# Patient Record
Sex: FEMALE | Race: WHITE | NOT HISPANIC OR LATINO | Employment: OTHER | URBAN - METROPOLITAN AREA
[De-identification: names, ages, dates, MRNs, and addresses within clinical notes are randomized per-mention and may not be internally consistent; named-entity substitution may affect disease eponyms.]

---

## 2017-02-15 ENCOUNTER — TELEPHONE (OUTPATIENT)
Dept: MEDICAL GROUP | Age: 37
End: 2017-02-15

## 2017-02-16 NOTE — TELEPHONE ENCOUNTER
1. Caller Name: Sara Taylor                                         Call Back Number: [phone] (home)       Patient approves a detailed voicemail message: N\A    Returned patient voicemail regarding adderall medication, pt was not specific about what she needed. Left VM for her to call us back and will try again at a later time.

## 2017-02-16 NOTE — TELEPHONE ENCOUNTER
Phone Number Called: [phone] (home)     Message: Left message for the patient to call back regarding aderall medication.    Left Message for patient to call back: yes

## 2017-02-17 NOTE — TELEPHONE ENCOUNTER
Phone Number Called: [phone] (home)     Message: called pt left message for pt to call back, [employer] message sent to pt for her to provide more information.     Left Message for patient to call back: yes

## 2017-02-21 DIAGNOSIS — F98.8 ADD (ATTENTION DEFICIT DISORDER): ICD-10-CM

## 2017-02-21 RX ORDER — DEXTROAMPHETAMINE SACCHARATE, AMPHETAMINE ASPARTATE MONOHYDRATE, DEXTROAMPHETAMINE SULFATE AND AMPHETAMINE SULFATE 5; 5; 5; 5 MG/1; MG/1; MG/1; MG/1
20 CAPSULE, EXTENDED RELEASE ORAL EVERY MORNING
Qty: 90 CAP | Refills: 0 | Status: SHIPPED | OUTPATIENT
Start: 2017-02-21 | End: 2017-03-14 | Stop reason: SDUPTHER

## 2017-02-21 RX ORDER — DEXTROAMPHETAMINE SACCHARATE, AMPHETAMINE ASPARTATE, DEXTROAMPHETAMINE SULFATE AND AMPHETAMINE SULFATE 2.5; 2.5; 2.5; 2.5 MG/1; MG/1; MG/1; MG/1
10 TABLET ORAL DAILY
Qty: 90 TAB | Refills: 0 | Status: SHIPPED | OUTPATIENT
Start: 2017-02-21 | End: 2017-03-14 | Stop reason: SDUPTHER

## 2017-02-21 NOTE — TELEPHONE ENCOUNTER
Was the patient seen in the last year in this department? Yes     Does patient have an active prescription for medications requested? No     Received Request Via: Patient      PLEASE CONTACT PATIENT WITH RESPONSE.  DETAILED MESSAGE OKAY.  PATIENT REQUESTING TO DO 6 MO AT A TIME DUE TO HAVING A DIFFERENT INSURANCE AND HAVING TO PAY OUT OF POCKET OF $120 FOR EACH VISIT.  PLEASE ADVISE.

## 2017-02-22 NOTE — TELEPHONE ENCOUNTER
Phone Number Called: [phone] (home)     Message: Left VM for patient to call us back to schedule appt, rxs placed in patient  file and patient notified they are ready for .    Left Message for patient to call back: yes

## 2017-02-27 ENCOUNTER — APPOINTMENT (OUTPATIENT)
Dept: MEDICAL GROUP | Age: 37
End: 2017-02-27
Payer: COMMERCIAL

## 2017-03-09 RX ORDER — DEXTROAMPHETAMINE SACCHARATE, AMPHETAMINE ASPARTATE, DEXTROAMPHETAMINE SULFATE AND AMPHETAMINE SULFATE 2.5; 2.5; 2.5; 2.5 MG/1; MG/1; MG/1; MG/1
10 TABLET ORAL
Qty: 30 TAB | Refills: 0 | Status: SHIPPED | OUTPATIENT
Start: 2017-03-09 | End: 2017-04-08

## 2017-03-09 RX ORDER — DEXTROAMPHETAMINE SACCHARATE, AMPHETAMINE ASPARTATE MONOHYDRATE, DEXTROAMPHETAMINE SULFATE AND AMPHETAMINE SULFATE 5; 5; 5; 5 MG/1; MG/1; MG/1; MG/1
20 CAPSULE, EXTENDED RELEASE ORAL EVERY MORNING
Qty: 30 CAP | Refills: 0 | Status: SHIPPED | OUTPATIENT
Start: 2017-03-09 | End: 2017-04-07

## 2017-03-09 NOTE — TELEPHONE ENCOUNTER
1. Caller Name: Sara Taylor                                           Call Back Number: [phone] (home)         Patient approves a detailed voicemail message: yes    Dr. Esqueda patient called requesting 6 refills of her medication 30 for each RX as a 90 day supply is expensive for her. Please advise

## 2017-03-09 NOTE — TELEPHONE ENCOUNTER
Ok, but Pt needs to be seen very 4 mos to refill her adderall. So make appt now to be seen before anymore refills.  Have her bring the other rxs ffor #90 each in exchange the new rxs for #30

## 2017-03-09 NOTE — TELEPHONE ENCOUNTER
Phone Number Called: [phone] (home)     Message: Left message for pt to call us back regarding note below. New rx's placed in pt  file    Left Message for patient to call back: yes

## 2017-03-13 RX ORDER — BUTALBITAL, ACETAMINOPHEN AND CAFFEINE 50; 325; 40 MG/1; MG/1; MG/1
TABLET ORAL
Qty: 30 TAB | Refills: 0 | Status: SHIPPED | OUTPATIENT
Start: 2017-03-13 | End: 2017-03-14 | Stop reason: SDUPTHER

## 2017-03-14 DIAGNOSIS — F98.8 ADD (ATTENTION DEFICIT DISORDER): ICD-10-CM

## 2017-03-14 RX ORDER — DEXTROAMPHETAMINE SACCHARATE, AMPHETAMINE ASPARTATE MONOHYDRATE, DEXTROAMPHETAMINE SULFATE AND AMPHETAMINE SULFATE 5; 5; 5; 5 MG/1; MG/1; MG/1; MG/1
20 CAPSULE, EXTENDED RELEASE ORAL EVERY MORNING
Qty: 30 CAP | Refills: 0 | Status: SHIPPED | OUTPATIENT
Start: 2017-04-13 | End: 2017-05-13

## 2017-03-14 RX ORDER — DEXTROAMPHETAMINE SACCHARATE, AMPHETAMINE ASPARTATE, DEXTROAMPHETAMINE SULFATE AND AMPHETAMINE SULFATE 2.5; 2.5; 2.5; 2.5 MG/1; MG/1; MG/1; MG/1
10 TABLET ORAL
Qty: 30 TAB | Refills: 0 | Status: SHIPPED | OUTPATIENT
Start: 2017-04-14 | End: 2017-05-14

## 2017-03-14 RX ORDER — DEXTROAMPHETAMINE SACCHARATE, AMPHETAMINE ASPARTATE MONOHYDRATE, DEXTROAMPHETAMINE SULFATE AND AMPHETAMINE SULFATE 5; 5; 5; 5 MG/1; MG/1; MG/1; MG/1
20 CAPSULE, EXTENDED RELEASE ORAL EVERY MORNING
Qty: 30 CAP | Refills: 0 | Status: SHIPPED | OUTPATIENT
Start: 2017-05-14 | End: 2017-06-05 | Stop reason: SDUPTHER

## 2017-03-14 RX ORDER — DEXTROAMPHETAMINE SACCHARATE, AMPHETAMINE ASPARTATE, DEXTROAMPHETAMINE SULFATE AND AMPHETAMINE SULFATE 2.5; 2.5; 2.5; 2.5 MG/1; MG/1; MG/1; MG/1
10 TABLET ORAL DAILY
Qty: 30 TAB | Refills: 0 | Status: SHIPPED | OUTPATIENT
Start: 2017-05-14 | End: 2017-06-05 | Stop reason: SDUPTHER

## 2017-03-14 RX ORDER — BUTALBITAL, ACETAMINOPHEN AND CAFFEINE 50; 325; 40 MG/1; MG/1; MG/1
1 TABLET ORAL EVERY 4 HOURS PRN
Qty: 30 TAB | Refills: 3 | Status: SHIPPED | OUTPATIENT
Start: 2017-03-14 | End: 2017-06-05 | Stop reason: SDUPTHER

## 2017-05-16 ENCOUNTER — TELEPHONE (OUTPATIENT)
Dept: MEDICAL GROUP | Age: 37
End: 2017-05-16

## 2017-05-16 NOTE — TELEPHONE ENCOUNTER
Phone Number Called: [phone] (home)     Message: returning pt vm regarding med refills.  Left message for patient to return call.    Left Message for patient to call back: yes    Future Appointments       Provider Department Center    6/5/2017 1:00 PM Leighton Esqueda M.D. [facility] [address]

## 2017-05-16 NOTE — Clinical Note
May 24, 2017        Sara Taylor  [address] [place] [address]        Dear Sara:    Our office has attempted to contact you but have been unsuccessful.  We are contacting you to follow up on a voice message we have received regarding your medication refills.  Please contact our office if this refill request is still needed.  Thank you.    If you have any questions or concerns, please don't hesitate to call.        Sincerely,        Kimi Knutson  Medical Assistant    Electronically Signed

## 2017-05-24 NOTE — TELEPHONE ENCOUNTER
Phone Number Called: [phone] (home)     Message: left message for patient to return call.  Letter sent.    Left Message for patient to call back: yes

## 2017-06-02 ENCOUNTER — TELEPHONE (OUTPATIENT)
Dept: MEDICAL GROUP | Age: 37
End: 2017-06-02

## 2017-06-02 NOTE — TELEPHONE ENCOUNTER
ESTABLISHED PATIENT PRE-VISIT PLANNING     Note: Patient will not be contacted if there is no indication to call.     1.  Reviewed notes from the last few office visits within the medical group: Yes    2.  If any orders were placed at last visit or intended to be done for this visit (i.e. 6 mos follow-up), do we have Results/Consult Notes?        •  Labs - Labs ordered, completed and results are in chart.       •  Imaging - Imaging was not ordered at last office visit.       •  Referrals - No referrals were ordered at last office visit.    3. Is this appointment scheduled as a Hospital Follow-Up? No    4.  Immunizations were updated in [facility] using WebIZ?: Yes       •  Web Iz Recommendations: HEPATITIS A  MMR  TD VARICELLA (Chicken Pox)     5.  Patient is due for the following Health Maintenance Topics:   Health Maintenance Due   Topic Date Due   • PAP SMEAR  10/14/2016       - Patient has completed FLU and TDAP Immunization(s) per WebIZ. Chart has been updated.    6.  Patient was not informed to arrive 15 min prior to their scheduled appointment and bring in their medication bottles.

## 2017-06-05 ENCOUNTER — OFFICE VISIT (OUTPATIENT)
Dept: MEDICAL GROUP | Age: 37
End: 2017-06-05
Payer: COMMERCIAL

## 2017-06-05 VITALS
BODY MASS INDEX: 26.5 KG/M2 | WEIGHT: 144 LBS | HEIGHT: 62 IN | TEMPERATURE: 98.1 F | SYSTOLIC BLOOD PRESSURE: 124 MMHG | OXYGEN SATURATION: 93 % | HEART RATE: 90 BPM | DIASTOLIC BLOOD PRESSURE: 84 MMHG

## 2017-06-05 DIAGNOSIS — L72.3 SEBACEOUS CYST: ICD-10-CM

## 2017-06-05 DIAGNOSIS — M79.7 SCAPULOHUMERAL FIBROSITIS: ICD-10-CM

## 2017-06-05 DIAGNOSIS — E78.2 MIXED HYPERLIPIDEMIA: ICD-10-CM

## 2017-06-05 DIAGNOSIS — M54.40 CHRONIC MIDLINE LOW BACK PAIN WITH SCIATICA, SCIATICA LATERALITY UNSPECIFIED: ICD-10-CM

## 2017-06-05 DIAGNOSIS — G89.29 CHRONIC MIDLINE LOW BACK PAIN WITH SCIATICA, SCIATICA LATERALITY UNSPECIFIED: ICD-10-CM

## 2017-06-05 DIAGNOSIS — N94.6 MENSTRUAL CRAMPS: ICD-10-CM

## 2017-06-05 DIAGNOSIS — G43.109 MIGRAINE WITH AURA AND WITHOUT STATUS MIGRAINOSUS, NOT INTRACTABLE: ICD-10-CM

## 2017-06-05 DIAGNOSIS — F98.8 ADD (ATTENTION DEFICIT DISORDER): ICD-10-CM

## 2017-06-05 PROCEDURE — 99215 OFFICE O/P EST HI 40 MIN: CPT | Performed by: INTERNAL MEDICINE

## 2017-06-05 RX ORDER — DIAZEPAM 5 MG/1
5 TABLET ORAL NIGHTLY PRN
Qty: 30 TAB | Refills: 3 | Status: SHIPPED | OUTPATIENT
Start: 2017-06-05 | End: 2017-12-12 | Stop reason: SDUPTHER

## 2017-06-05 RX ORDER — DEXTROAMPHETAMINE SACCHARATE, AMPHETAMINE ASPARTATE, DEXTROAMPHETAMINE SULFATE AND AMPHETAMINE SULFATE 2.5; 2.5; 2.5; 2.5 MG/1; MG/1; MG/1; MG/1
10 TABLET ORAL DAILY
Qty: 30 TAB | Refills: 0 | Status: SHIPPED | OUTPATIENT
Start: 2017-06-16 | End: 2018-03-29

## 2017-06-05 RX ORDER — DEXTROAMPHETAMINE SACCHARATE, AMPHETAMINE ASPARTATE MONOHYDRATE, DEXTROAMPHETAMINE SULFATE AND AMPHETAMINE SULFATE 5; 5; 5; 5 MG/1; MG/1; MG/1; MG/1
20 CAPSULE, EXTENDED RELEASE ORAL EVERY MORNING
Qty: 30 CAP | Refills: 0 | Status: SHIPPED | OUTPATIENT
Start: 2017-07-17 | End: 2018-03-29 | Stop reason: SDUPTHER

## 2017-06-05 RX ORDER — DEXTROAMPHETAMINE SACCHARATE, AMPHETAMINE ASPARTATE MONOHYDRATE, DEXTROAMPHETAMINE SULFATE AND AMPHETAMINE SULFATE 5; 5; 5; 5 MG/1; MG/1; MG/1; MG/1
20 CAPSULE, EXTENDED RELEASE ORAL EVERY MORNING
Qty: 30 CAP | Refills: 0 | Status: SHIPPED | OUTPATIENT
Start: 2017-08-16 | End: 2017-09-15

## 2017-06-05 RX ORDER — DEXTROAMPHETAMINE SACCHARATE, AMPHETAMINE ASPARTATE, DEXTROAMPHETAMINE SULFATE AND AMPHETAMINE SULFATE 2.5; 2.5; 2.5; 2.5 MG/1; MG/1; MG/1; MG/1
10 TABLET ORAL DAILY
Qty: 30 TAB | Refills: 0 | Status: SHIPPED | OUTPATIENT
Start: 2017-10-16 | End: 2017-11-15

## 2017-06-05 RX ORDER — DEXTROAMPHETAMINE SACCHARATE, AMPHETAMINE ASPARTATE MONOHYDRATE, DEXTROAMPHETAMINE SULFATE AND AMPHETAMINE SULFATE 5; 5; 5; 5 MG/1; MG/1; MG/1; MG/1
20 CAPSULE, EXTENDED RELEASE ORAL EVERY MORNING
Qty: 30 CAP | Refills: 0 | Status: SHIPPED | OUTPATIENT
Start: 2017-09-15 | End: 2017-10-15

## 2017-06-05 RX ORDER — DEXTROAMPHETAMINE SACCHARATE, AMPHETAMINE ASPARTATE MONOHYDRATE, DEXTROAMPHETAMINE SULFATE AND AMPHETAMINE SULFATE 5; 5; 5; 5 MG/1; MG/1; MG/1; MG/1
20 CAPSULE, EXTENDED RELEASE ORAL EVERY MORNING
Qty: 30 CAP | Refills: 0 | Status: SHIPPED | OUTPATIENT
Start: 2017-10-16 | End: 2017-11-15

## 2017-06-05 RX ORDER — DEXTROAMPHETAMINE SACCHARATE, AMPHETAMINE ASPARTATE, DEXTROAMPHETAMINE SULFATE AND AMPHETAMINE SULFATE 2.5; 2.5; 2.5; 2.5 MG/1; MG/1; MG/1; MG/1
10 TABLET ORAL DAILY
Qty: 30 TAB | Refills: 0 | Status: SHIPPED | OUTPATIENT
Start: 2017-08-16 | End: 2017-09-15

## 2017-06-05 RX ORDER — DEXTROAMPHETAMINE SACCHARATE, AMPHETAMINE ASPARTATE, DEXTROAMPHETAMINE SULFATE AND AMPHETAMINE SULFATE 2.5; 2.5; 2.5; 2.5 MG/1; MG/1; MG/1; MG/1
10 TABLET ORAL DAILY
Qty: 30 TAB | Refills: 0 | Status: SHIPPED | OUTPATIENT
Start: 2017-07-16 | End: 2018-03-29 | Stop reason: SDUPTHER

## 2017-06-05 RX ORDER — BUTALBITAL, ACETAMINOPHEN AND CAFFEINE 50; 325; 40 MG/1; MG/1; MG/1
1 TABLET ORAL EVERY 4 HOURS PRN
Qty: 30 TAB | Refills: 3 | Status: SHIPPED | OUTPATIENT
Start: 2017-06-05 | End: 2018-03-29

## 2017-06-05 RX ORDER — DEXTROAMPHETAMINE SACCHARATE, AMPHETAMINE ASPARTATE MONOHYDRATE, DEXTROAMPHETAMINE SULFATE AND AMPHETAMINE SULFATE 5; 5; 5; 5 MG/1; MG/1; MG/1; MG/1
20 CAPSULE, EXTENDED RELEASE ORAL EVERY MORNING
Qty: 30 CAP | Refills: 0 | Status: SHIPPED | OUTPATIENT
Start: 2017-06-16 | End: 2018-03-29

## 2017-06-05 RX ORDER — DEXTROAMPHETAMINE SACCHARATE, AMPHETAMINE ASPARTATE MONOHYDRATE, DEXTROAMPHETAMINE SULFATE AND AMPHETAMINE SULFATE 5; 5; 5; 5 MG/1; MG/1; MG/1; MG/1
20 CAPSULE, EXTENDED RELEASE ORAL EVERY MORNING
Qty: 30 CAP | Refills: 0 | Status: SHIPPED | OUTPATIENT
Start: 2017-11-15 | End: 2017-12-15

## 2017-06-05 RX ORDER — CYCLOBENZAPRINE HCL 10 MG
10 TABLET ORAL 3 TIMES DAILY PRN
Qty: 30 TAB | Refills: 3 | Status: SHIPPED | OUTPATIENT
Start: 2017-06-05 | End: 2017-06-05

## 2017-06-05 RX ORDER — DEXTROAMPHETAMINE SACCHARATE, AMPHETAMINE ASPARTATE, DEXTROAMPHETAMINE SULFATE AND AMPHETAMINE SULFATE 2.5; 2.5; 2.5; 2.5 MG/1; MG/1; MG/1; MG/1
10 TABLET ORAL DAILY
Qty: 30 TAB | Refills: 0 | Status: SHIPPED | OUTPATIENT
Start: 2017-09-15 | End: 2017-10-15

## 2017-06-05 RX ORDER — DIAZEPAM 5 MG/1
5 TABLET ORAL NIGHTLY PRN
Qty: 5 TAB | Refills: 3 | Status: SHIPPED | OUTPATIENT
Start: 2017-06-05 | End: 2017-06-05 | Stop reason: SDUPTHER

## 2017-06-05 RX ORDER — DEXTROAMPHETAMINE SACCHARATE, AMPHETAMINE ASPARTATE, DEXTROAMPHETAMINE SULFATE AND AMPHETAMINE SULFATE 2.5; 2.5; 2.5; 2.5 MG/1; MG/1; MG/1; MG/1
10 TABLET ORAL DAILY
Qty: 30 TAB | Refills: 0 | Status: SHIPPED | OUTPATIENT
Start: 2017-11-15 | End: 2017-12-15

## 2017-06-05 ASSESSMENT — ENCOUNTER SYMPTOMS
MUSCULOSKELETAL NEGATIVE: 1
CONSTITUTIONAL NEGATIVE: 1
EYES NEGATIVE: 1
RESPIRATORY NEGATIVE: 1
PSYCHIATRIC NEGATIVE: 1
GASTROINTESTINAL NEGATIVE: 1
NEUROLOGICAL NEGATIVE: 1
CARDIOVASCULAR NEGATIVE: 1

## 2017-06-05 ASSESSMENT — PATIENT HEALTH QUESTIONNAIRE - PHQ9: CLINICAL INTERPRETATION OF PHQ2 SCORE: 0

## 2017-06-05 NOTE — MR AVS SNAPSHOT
"        Sara Brandon   2017 1:00 PM   Office Visit   MRN: [id number]    Department:  [address] [place] [address]   Dept Phone:  [phone]    Description:  Female : 1980   Provider:  Leighton Esqueda M.D.           Reason for Visit     Medication Refill follow up      Allergies as of 2017     No Known Allergies      You were diagnosed with     Sebaceous cyst   [706.2.ICD-9-CM]       ADD (attention deficit disorder)   [559376]       Scapulohumeral fibrositis   [734714]       Menstrual cramps   []       Mixed hyperlipidemia   [272.2.ICD-9-CM]       Chronic midline low back pain with sciatica, sciatica laterality unspecified   [0077725]       Migraine with aura and without status migrainosus, not intractable   [497286]         Vital Signs     Blood Pressure Pulse Temperature Height Weight Body Mass Index    124/84 mmHg 90 36.7 °C (98.1 °F) 1.575 m (5' 2.01\") 65.318 kg (144 lb) 26.33 kg/m2    Oxygen Saturation Last Menstrual Period Smoking Status             93% 2015 Never Smoker          Basic Information     Date Of Birth Sex Race Ethnicity Preferred Language    1980 Female White Non- English      Your appointments     Dec 05, 2017  1:00 PM   Established Patient with Leighton Esqueda M.D.   [address] [id number] [phone]           You will be receiving a confirmation call a few days before your appointment from our automated call confirmation system.              Problem List              ICD-10-CM Priority Class Noted - Resolved    ADD (attention deficit disorder) F98.8   10/7/2013 - Present    Mixed hyperlipidemia E78.2   10/7/2013 - Present    Migraine with aura and without status migrainosus, not intractable G43.109   2015 - Present    Sebaceous cyst L72.3   2016 - Present    Vitamin D deficiency E55.9   8/15/2016 - Present    Midline low back pain with sciatica M54.40   8/15/2016 - Present      Health " Maintenance        Date Due Completion Dates    PAP SMEAR 10/14/2016 10/14/2013    IMM DTaP/Tdap/Td Vaccine (2 - Td) 10/5/2025 10/5/2015            Current Immunizations     Influenza Vaccine Quad Inj (Pf) 11/28/2016    Influenza Vaccine Quad Inj (Preserved) 11/1/2015  8:52 AM    Tdap Vaccine 10/5/2015      Below and/or attached are the medications your provider expects you to take. Review all of your home medications and newly ordered medications with your provider and/or pharmacist. Follow medication instructions as directed by your provider and/or pharmacist. Please keep your medication list with you and share with your provider. Update the information when medications are discontinued, doses are changed, or new medications (including over-the-counter products) are added; and carry medication information at all times in the event of emergency situations     Allergies:  No Known Allergies          Medications  Valid as of: June 05, 2017 -  1:47 PM    Generic Name Brand Name Tablet Size Instructions for use    Amphetamine-Dextroamphetamine (Tab) ADDERALL 10 MG Take 1 Tab by mouth every day for 30 days.        Amphetamine-Dextroamphetamine (CAPSULE SR 24 HR) ADDERALL XR 20 MG Take 1 Cap by mouth every morning for 30 days.        Amphetamine-Dextroamphetamine (Tab) ADDERALL 10 MG Take 1 Tab by mouth every day for 30 days.        Amphetamine-Dextroamphetamine (CAPSULE SR 24 HR) ADDERALL XR 20 MG Take 1 Cap by mouth every morning for 30 days.        Amphetamine-Dextroamphetamine (Tab) ADDERALL 10 MG Take 1 Tab by mouth every day for 30 days.        Amphetamine-Dextroamphetamine (Tab) ADDERALL 10 MG Take 1 Tab by mouth every day for 30 days.        Amphetamine-Dextroamphetamine (CAPSULE SR 24 HR) ADDERALL XR 20 MG Take 1 Cap by mouth every morning for 30 days.        Amphetamine-Dextroamphetamine (CAPSULE SR 24 HR) ADDERALL XR 20 MG Take 1 Cap by mouth every morning for 30 days.        Amphetamine-Dextroamphetamine  (Tab) ADDERALL 10 MG Take 1 Tab by mouth every day for 30 days.        Amphetamine-Dextroamphetamine (CAPSULE SR 24 HR) ADDERALL XR 20 MG Take 1 Cap by mouth every morning for 30 days.        Amphetamine-Dextroamphetamine (Tab) ADDERALL 10 MG Take 1 Tab by mouth every day for 30 days.        Amphetamine-Dextroamphetamine (CAPSULE SR 24 HR) ADDERALL XR 20 MG Take 1 Cap by mouth every morning for 30 days.        Butalbital-APAP-Caffeine (Tab) FIORICET -40 MG Take 1 Tab by mouth every four hours as needed. FOR HEADACHE        Cholecalciferol (Tab) Vitamin D3 5000 UNITS Take  by mouth.        Cyanocobalamin (Liquid) Vitamin B-12 1000 MCG/15ML Take  by mouth.        DiazePAM (Tab) VALIUM 5 MG Take 1 Tab by mouth at bedtime as needed (pelvic cramps).        Ferrous Sulfate (Tab) ferrous sulfate 325 (65 FE) MG Take 1 Tab by mouth every morning with breakfast.        Misc. Devices (Misc) Breast Pump  1 Device by Apply externally route every day.        Prenatal Multivit-Min-Fe-FA   Take  by mouth.        .                 Medicines prescribed today were sent to:     [id number] - [place], [place] - [address]    [address] [address]    Phone: [phone] Fax: [phone]    Open 24 Hours?: No    [facility]Y #[id number] - [place], [place] - [address]    [address] [facility] [address]    Phone: [phone] Fax: [phone]    Open 24 Hours?: No      Medication refill instructions:       If your prescription bottle indicates you have medication refills left, it is not necessary to call your provider’s office. Please contact your pharmacy and they will refill your medication.    If your prescription bottle indicates you do not have any refills left, you may request refills at any time through one of the following ways: The online [employer] system (except Urgent Care), by calling your provider’s office, or by asking your pharmacy to contact your provider’s office with a refill request. Medication  refills are processed only during regular business hours and may not be available until the next business day. Your provider may request additional information or to have a follow-up visit with you prior to refilling your medication.   *Please Note: Medication refills are assigned a new Rx number when refilled electronically. Your pharmacy may indicate that no refills were authorized even though a new prescription for the same medication is available at the pharmacy. Please request the medicine by name with the pharmacy before contacting your provider for a refill.        Referral     A referral request has been sent to our patient care coordination department. Please allow 3-5 business days for us to process this request and contact you either by phone or mail. If you do not hear from us by the 5th business day, please call us at [phone].           KXEN Access Code: Activation code not generated  Current KXEN Status: Active

## 2017-06-06 NOTE — PROGRESS NOTES
Subjective:      Sara Taylor is a 36 y.o. female who presents with Medication Refill  and   The patient is here for followup of chronic medical problems listed below. The patient is compliant with medications and having no side effects from them. Denies chest pain, abdominal pain, dyspnea, myalgias, or cough.   Patient Active Problem List    Diagnosis Date Noted   • Vitamin D deficiency 08/15/2016   • Midline low back pain with sciatica 08/15/2016   • Sebaceous cyst 05/17/2016   • Migraine with aura and without status migrainosus, not intractable 06/09/2015   • ADD (attention deficit disorder) 10/07/2013   • Mixed hyperlipidemia 10/07/2013     Review of patient's allergies indicates no known allergies.  Outpatient Prescriptions Prior to Visit   Medication Sig Dispense Refill   • Cholecalciferol (VITAMIN D3) 5000 UNITS Tab Take  by mouth.     • Cyanocobalamin (VITAMIN B-12) 1000 MCG/15ML Liquid Take  by mouth.     • ferrous sulfate 325 (65 FE) MG tablet Take 1 Tab by mouth every morning with breakfast. 30 Tab 3   • Prenatal Multivit-Min-Fe-FA (PRENATAL VITAMINS PO) Take  by mouth.     • amphetamine-dextroamphetamine (ADDERALL) 10 MG Tab Take 1 Tab by mouth every day for 30 days. 30 Tab 0   • acetaminophen/caffeine/butalbital 325-40-50 mg (FIORICET) -40 MG Tab Take 1 Tab by mouth every four hours as needed. FOR HEADACHE 30 Tab 3   • amphetamine-dextroamphetamine (ADDERALL XR, 20MG,) 20 MG per XR capsule Take 1 Cap by mouth every morning for 30 days. 30 Cap 0   • diazepam (VALIUM) 5 MG Tab Take 1 Tab by mouth at bedtime as needed (pelvic cramps). 5 Tab 3   • Misc. Devices (BREAST PUMP) Misc 1 Device by Apply externally route every day. (Patient not taking: Reported on 8/15/2016) 1 Each 0     No facility-administered medications prior to visit.               HPI    Review of Systems   Constitutional: Negative.    HENT: Negative.    Eyes: Negative.    Respiratory: Negative.    Cardiovascular: Negative.   "  Gastrointestinal: Negative.    Genitourinary: Negative.    Musculoskeletal: Negative.    Skin: Negative.    Neurological: Negative.    Endo/Heme/Allergies: Negative.    Psychiatric/Behavioral: Negative.           Objective:     /84 mmHg  Pulse 90  Temp(Src) 36.7 °C (98.1 °F)  Ht 1.575 m (5' 2.01\")  Wt 65.318 kg (144 lb)  BMI 26.33 kg/m2  SpO2 93%  LMP 01/22/2015     Physical Exam   Constitutional: She is oriented to person, place, and time. She appears well-developed and well-nourished.   HENT:   Head: Normocephalic and atraumatic.   Right Ear: External ear normal.   Left Ear: External ear normal.   Nose: Nose normal.   Mouth/Throat: Oropharynx is clear and moist.   Eyes: Conjunctivae and EOM are normal. Pupils are equal, round, and reactive to light. Right eye exhibits no discharge. Left eye exhibits no discharge. No scleral icterus.   Neck: Normal range of motion. Neck supple. No JVD present. No tracheal deviation present. No thyromegaly present.   Cardiovascular: Normal rate, regular rhythm, normal heart sounds and intact distal pulses.  Exam reveals no gallop and no friction rub.    Pulmonary/Chest: Effort normal and breath sounds normal. No stridor. No respiratory distress. She has no wheezes. She has no rales. She exhibits no tenderness.   Abdominal: Soft. Bowel sounds are normal. She exhibits no distension and no mass. There is no tenderness. There is no rebound and no guarding. No hernia.   Musculoskeletal: Normal range of motion. She exhibits no edema or tenderness.   Lymphadenopathy:     She has no cervical adenopathy.   Neurological: She is alert and oriented to person, place, and time. She has normal reflexes. She displays normal reflexes. No cranial nerve deficit. Coordination normal.   Skin: Skin is warm and dry. No rash noted. No erythema. No pallor.   Psychiatric: She has a normal mood and affect. Her behavior is normal. Judgment and thought content normal.   Nursing note and vitals " reviewed.  No visits with results within 1 Month(s) from this visit.  Latest known visit with results is:    Hospital Outpatient Visit on 11/29/2016   Component Date Value   • WBC 11/29/2016 6.3    • RBC 11/29/2016 5.16    • Hemoglobin 11/29/2016 15.8    • Hematocrit 11/29/2016 47.4*   • MCV 11/29/2016 91.9    • MCH 11/29/2016 30.6    • MCHC 11/29/2016 33.3*   • RDW 11/29/2016 41.2    • Platelet Count 11/29/2016 201    • MPV 11/29/2016 10.5    • Neutrophils-Polys 11/29/2016 57.10    • Lymphocytes 11/29/2016 31.60    • Monocytes 11/29/2016 8.40    • Eosinophils 11/29/2016 2.20    • Basophils 11/29/2016 0.50    • Immature Granulocytes 11/29/2016 0.20    • Nucleated RBC 11/29/2016 0.00    • Neutrophils (Absolute) 11/29/2016 3.60    • Lymphs (Absolute) 11/29/2016 1.99    • Monos (Absolute) 11/29/2016 0.53    • Eos (Absolute) 11/29/2016 0.14    • Baso (Absolute) 11/29/2016 0.03    • Immature Granulocytes (a* 11/29/2016 0.01    • NRBC (Absolute) 11/29/2016 0.00    • Cholesterol,Tot 11/29/2016 225*   • Triglycerides 11/29/2016 87    • HDL 11/29/2016 74    • LDL 11/29/2016 134*   • Sodium 11/29/2016 138    • Potassium 11/29/2016 4.2    • Chloride 11/29/2016 103    • Co2 11/29/2016 29    • Anion Gap 11/29/2016 6.0    • Glucose 11/29/2016 84    • Bun 11/29/2016 10    • Creatinine 11/29/2016 0.82    • Calcium 11/29/2016 9.5    • AST(SGOT) 11/29/2016 16    • ALT(SGPT) 11/29/2016 18    • Alkaline Phosphatase 11/29/2016 81    • Total Bilirubin 11/29/2016 0.9    • Albumin 11/29/2016 4.4    • Total Protein 11/29/2016 7.5    • Globulin 11/29/2016 3.1    • A-G Ratio 11/29/2016 1.4    • 25-Hydroxy   Vitamin D 25 11/29/2016 22*   • GFR If  11/29/2016 >60    • GFR If Non  Ameri* 11/29/2016 >60       No results found for: HBA1C  Lab Results   Component Value Date/Time    SODIUM 138 11/29/2016 12:13 PM    POTASSIUM 4.2 11/29/2016 12:13 PM    CHLORIDE 103 11/29/2016 12:13 PM    CO2 29 11/29/2016 12:13 PM    GLUCOSE  84 11/29/2016 12:13 PM    BUN 10 11/29/2016 12:13 PM    CREATININE 0.82 11/29/2016 12:13 PM    ALKALINE PHOSPHATASE 81 11/29/2016 12:13 PM    AST(SGOT) 16 11/29/2016 12:13 PM    ALT(SGPT) 18 11/29/2016 12:13 PM    TOTAL BILIRUBIN 0.9 11/29/2016 12:13 PM     No results found for: INR  Lab Results   Component Value Date/Time    CHOLESTEROL,* 11/29/2016 12:13 PM    * 11/29/2016 12:13 PM    HDL 74 11/29/2016 12:13 PM    TRIGLYCERIDES 87 11/29/2016 12:13 PM       No results found for: TESTOSTERONE  No results found for: TSH  Lab Results   Component Value Date/Time    FREE T-4 0.98 09/09/2013 11:26 AM     No results found for: URICACID  No components found for: VITB12  Lab Results   Component Value Date/Time    25-HYDROXY   VITAMIN D 25 22* 11/29/2016 12:13 PM                  Assessment/Plan:     1. Sebaceous cyst-this problem on her right dorsal forearm has recurred even those excised last year by her dermatologist. She'll be referred back to the dermatologist for reexcision. This may represent a granuloma that may again recur but we will defer to the dermatologist on this.         - REFERRAL TO DERMATOLOGY    2. ADD (attention deficit disorder)-this is under good control with Adderall 30 mg a day total in divided dosages of 20 and 10. Patient provided with a 6 month supply of refills and will recheck again at that time. Since she seems to be doing well and stable on this regimen without any increase tolerance or abuse, will not need to see her back anymore frequently than this.     - amphetamine-dextroamphetamine (ADDERALL) 10 MG Tab; Take 1 Tab by mouth every day for 30 days.  Dispense: 30 Tab; Refill: 0  - amphetamine-dextroamphetamine (ADDERALL XR, 20MG,) 20 MG per XR capsule; Take 1 Cap by mouth every morning for 30 days.  Dispense: 30 Cap; Refill: 0  - amphetamine-dextroamphetamine (ADDERALL) 10 MG Tab; Take 1 Tab by mouth every day for 30 days.  Dispense: 30 Tab; Refill: 0  -  amphetamine-dextroamphetamine (ADDERALL XR, 20MG,) 20 MG per XR capsule; Take 1 Cap by mouth every morning for 30 days.  Dispense: 30 Cap; Refill: 0  - amphetamine-dextroamphetamine (ADDERALL) 10 MG Tab; Take 1 Tab by mouth every day for 30 days.  Dispense: 30 Tab; Refill: 0  - amphetamine-dextroamphetamine (ADDERALL) 10 MG Tab; Take 1 Tab by mouth every day for 30 days.  Dispense: 30 Tab; Refill: 0  - amphetamine-dextroamphetamine (ADDERALL XR, 20MG,) 20 MG per XR capsule; Take 1 Cap by mouth every morning for 30 days.  Dispense: 30 Cap; Refill: 0  - amphetamine-dextroamphetamine (ADDERALL XR, 20MG,) 20 MG per XR capsule; Take 1 Cap by mouth every morning for 30 days.  Dispense: 30 Cap; Refill: 0  - amphetamine-dextroamphetamine (ADDERALL) 10 MG Tab; Take 1 Tab by mouth every day for 30 days.  Dispense: 30 Tab; Refill: 0  - amphetamine-dextroamphetamine (ADDERALL XR, 20MG,) 20 MG per XR capsule; Take 1 Cap by mouth every morning for 30 days.  Dispense: 30 Cap; Refill: 0  - amphetamine-dextroamphetamine (ADDERALL) 10 MG Tab; Take 1 Tab by mouth every day for 30 days.  Dispense: 30 Tab; Refill: 0  - amphetamine-dextroamphetamine (ADDERALL XR, 20MG,) 20 MG per XR capsule; Take 1 Cap by mouth every morning for 30 days.  Dispense: 30 Cap; Refill: 0    3. Scapulohumeral fibrositis-is a new problem. Continue to have intermittent muscle spasms of the left infrascapular area for which value may be useful as well as physical therapy sheet stretching and physical activity. Patient educated. Trial of Valium.     - REFERRAL TO PHYSICAL THERAPY Reason for Therapy: Eval/Treat/Report  - diazepam (VALIUM) 5 MG Tab; Take 1 Tab by mouth at bedtime as needed (pelvic cramps).  Dispense: 30 Tab; Refill: 3    4. Menstrual cramps Under good control. Continue same regimen. This is work well for her periodically for menstrual cramps and she'll continue using on an as-needed basis.     - diazepam (VALIUM) 5 MG Tab; Take 1 Tab by mouth at  bedtime as needed (pelvic cramps).  Dispense: 30 Tab; Refill: 3    5. Mixed hyperlipidemia-mild without need for medications. Diet and exercise. Good cholesterol HDL ratio includes a need for medication.- Under good control. Continue same regimen.     6. Chronic midline low back pain with sciatica, sciatica laterality unspecified Under good control. Continue same regimen. Continue with rest stretching strengthening anti-inflammatories and heat       7. Migraine with aura and without status migrainosus, not intractable Under good control. Continue same regimen.      - acetaminophen/caffeine/butalbital 325-40-50 mg (FIORICET) -40 MG Tab; Take 1 Tab by mouth every four hours as needed. FOR HEADACHE  Dispense: 30 Tab; Refill: 3        40 minute face-to-face encounter took place today.  More than half of this time was spent in the coordination of care of the above problems, as well as counseling.

## 2017-12-05 ENCOUNTER — APPOINTMENT (OUTPATIENT)
Dept: MEDICAL GROUP | Age: 37
End: 2017-12-05
Payer: COMMERCIAL

## 2017-12-12 DIAGNOSIS — N94.6 MENSTRUAL CRAMPS: ICD-10-CM

## 2017-12-12 DIAGNOSIS — M79.7 SCAPULOHUMERAL FIBROSITIS: ICD-10-CM

## 2017-12-12 RX ORDER — DIAZEPAM 5 MG/1
TABLET ORAL
Qty: 30 TAB | Refills: 0 | Status: SHIPPED | OUTPATIENT
Start: 2017-12-12 | End: 2018-03-29

## 2018-01-25 ENCOUNTER — HOSPITAL ENCOUNTER (OUTPATIENT)
Facility: MEDICAL CENTER | Age: 38
End: 2018-01-25
Attending: OBSTETRICS & GYNECOLOGY
Payer: COMMERCIAL

## 2018-01-25 ENCOUNTER — GYNECOLOGY VISIT (OUTPATIENT)
Dept: OBGYN | Facility: MEDICAL CENTER | Age: 38
End: 2018-01-25
Payer: COMMERCIAL

## 2018-01-25 VITALS
SYSTOLIC BLOOD PRESSURE: 120 MMHG | HEIGHT: 62 IN | DIASTOLIC BLOOD PRESSURE: 70 MMHG | BODY MASS INDEX: 30.73 KG/M2 | WEIGHT: 167 LBS

## 2018-01-25 DIAGNOSIS — Z11.51 ENCOUNTER FOR SCREENING FOR HUMAN PAPILLOMAVIRUS (HPV): ICD-10-CM

## 2018-01-25 DIAGNOSIS — Z01.419 WELL WOMAN EXAM WITH ROUTINE GYNECOLOGICAL EXAM: ICD-10-CM

## 2018-01-25 DIAGNOSIS — N83.9 PROBLEMS WITH OVULATION: ICD-10-CM

## 2018-01-25 DIAGNOSIS — Z12.4 SCREENING FOR CERVICAL CANCER: ICD-10-CM

## 2018-01-25 PROCEDURE — 99395 PREV VISIT EST AGE 18-39: CPT | Performed by: OBSTETRICS & GYNECOLOGY

## 2018-01-25 PROCEDURE — 87624 HPV HI-RISK TYP POOLED RSLT: CPT

## 2018-01-25 PROCEDURE — 88175 CYTOPATH C/V AUTO FLUID REDO: CPT

## 2018-01-25 NOTE — PROGRESS NOTES
ANNUAL Gynecologic Exam     HPI Comments:   37 YEAR OLD  presents for well woman exam. Patient's last menstrual period was 2017.  Pt reports that she had a miscarriage in 2017.   She and  are planning on another pregnancy.   Denies pelvic pains. Regular periods, only in December - last month, that she had 2 periods. Not heavy  This cycle is Day 4. She would like to take clomid  Healthy lifestyle  Never smoker  No family history of breast/ovarian cancer    Review of Systems   Pertinent positives documented in HPI and all other systems reviewed & are negative    All PMH, PSH, allergies, social history and FH reviewed and updated today:  Past Medical History:   Diagnosis Date   • ADD (attention deficit disorder with hyperactivity)      Past Surgical History:   Procedure Laterality Date   • MAMMOPLASTY AUGMENTATION  2010    Performed by GREGORIO NAVA at SURGERY [facility]   • LIPOSUCTION  2010    Performed by GREGORIO NAVA at [facility]     Patient has no known allergies.  Social History     Social History   • Marital status:      Spouse name: N/A   • Number of children: N/A   • Years of education: N/A     Social History Main Topics   • Smoking status: Never Smoker   • Smokeless tobacco: Never Used   • Alcohol use No   • Drug use: No   • Sexual activity: Yes     Partners: Male     Other Topics Concern   • Not on file     Social History Narrative   • No narrative on file     Family History   Problem Relation Age of Onset   • Thyroid Mother    • Heart Disease Father    • Cancer Father 65     colon     Medications:   Current Outpatient Prescriptions Ordered in [facility]   Medication Sig Dispense Refill   • clomiPHENE (CLOMID) 50 MG tablet Take 1 Tab by mouth every day. 5 Tab 0   • diazepam (VALIUM) 5 MG Tab TAKE 1 TABLET BY MOUTH AT BEDTIME AS NEEDED (PELVIC CRAMPS) (Patient not taking: Reported on 2018) 30 Tab 0   • cyclobenzaprine (FLEXERIL) 10 MG  "Tab Take 1 Tab by mouth 3 times a day as needed. (Patient not taking: Reported on 1/25/2018) 30 Tab 0   • acetaminophen/caffeine/butalbital 325-40-50 mg (FIORICET) -40 MG Tab Take 1 Tab by mouth every four hours as needed. FOR HEADACHE (Patient not taking: Reported on 1/25/2018) 30 Tab 3   • Cholecalciferol (VITAMIN D3) 5000 UNITS Tab Take  by mouth.     • Cyanocobalamin (VITAMIN B-12) 1000 MCG/15ML Liquid Take  by mouth.     • ferrous sulfate 325 (65 FE) MG tablet Take 1 Tab by mouth every morning with breakfast. 30 Tab 3   • Misc. Devices (BREAST PUMP) Misc 1 Device by Apply externally route every day. (Patient not taking: Reported on 8/15/2016) 1 Each 0   • Prenatal Multivit-Min-Fe-FA (PRENATAL VITAMINS PO) Take  by mouth.       No current Epic-ordered facility-administered medications on file.           Objective:   Vital measurements:  Blood pressure 120/70, height 1.575 m (5' 2.01\"), weight 75.8 kg (167 lb), last menstrual period 01/22/2017, not currently breastfeeding.  Body mass index is 30.53 kg/m². (Goal BM I>18 <25)    Physical Exam   Nursing note and vitals reviewed.  Constitutional: She is oriented to person, place, and time. She appears well-developed and well-nourished. No distress.     HEENT:   Head: Normocephalic and atraumatic.   Right Ear: External ear normal.   Left Ear: External ear normal.   Nose: Nose normal.   Eyes: Conjunctivae and EOM are normal. Pupils are equal, round, and reactive to light. No scleral icterus.     Neck: Normal range of motion. Neck supple. No tracheal deviation present. No thyromegaly present.     Pulmonary/Chest: Effort normal and breath sounds normal. No respiratory distress. She has no wheezes. She has no rales. She exhibits no tenderness.     Cardiovascular: Regular, rate and rhythm. No JVD.    Abdominal: Soft. Bowel sounds are normal. She exhibits no distension and no mass. No tenderness. She has no rebound and no guarding.     Breast:  Symmetrical, normal " consistency without masses.  C/w mammoplasty    Genitourinary:  Pelvic exam was performed with patient supine.  External genitalia with no abnormal pigmentation, labial fusion,rash, tenderness, lesion or injury to the labia bilaterally.  Vagina is moist with no lesions, foul discharge, erythema, tenderness or bleeding. No foreign body around the vagina or signs of injury.   Cervix exhibits no motion tenderness, no discharge and no friability.   Uterus is not deviated, not enlarged, not fixed and not tender.  Right adnexum displays no mass, no tenderness and no fullness. Left adnexum displays no mass, no tenderness and no fullness.     Musculoskeletal: Normal range of motion. She exhibits no edema and no tenderness.     Lymphadenopathy: She has no cervical adenopathy.     Neurological: She is alert and oriented to person, place, and time. She exhibits normal muscle tone.     Skin: Skin is warm and dry. No rash noted. She is not diaphoretic. No erythema. No pallor.     Psychiatric: She has a normal mood and affect. Her behavior is normal. Judgment and thought content normal.   Assessment:     1. Well woman exam with routine gynecological exam  ThinPrep Pap, w/HPV rflx to genotype   2. Screening for cervical cancer  ThinPrep Pap, w/HPV rflx to genotype   3. Encounter for screening for human papillomavirus (HPV)  ThinPrep Pap, w/HPV rflx to genotype   4. Problems with ovulation  clomiPHENE (CLOMID) 50 MG tablet     Plan:   Pap and physical exam performed. Pap q 3 years  Monthly SBE.  Counseling: breast self exam, STD prevention and HIV risk factors and prevention  Preconception counseling. Continue PNV with FA. R/I/B of clomid discussed not limited to ovarian hyperstimulation syndrome and associated complications. Pt given brochure and stated that she did her own research about it. Instructions on clomid intake and timed-intercourse given  Encourage exercise and proper diet.  Mammograms starting @ age 40 annually.  See  medications and orders placed in encounter report.

## 2018-01-26 ENCOUNTER — HOSPITAL ENCOUNTER (OUTPATIENT)
Facility: MEDICAL CENTER | Age: 38
End: 2018-01-26
Attending: OBSTETRICS & GYNECOLOGY
Payer: COMMERCIAL

## 2018-01-27 LAB
CYTOLOGY REG CYTOL: NORMAL
HPV HR 12 DNA CVX QL NAA+PROBE: NEGATIVE
HPV16 DNA SPEC QL NAA+PROBE: NEGATIVE
HPV18 DNA SPEC QL NAA+PROBE: NEGATIVE
SPECIMEN SOURCE: NORMAL

## 2018-02-08 ENCOUNTER — TELEPHONE (OUTPATIENT)
Dept: OBGYN | Facility: CLINIC | Age: 38
End: 2018-02-08

## 2018-02-08 NOTE — TELEPHONE ENCOUNTER
Called pt and spoke with her and told her she can come in and leave a sample or go to the lab. Pt also can go to urgent care to be treated but pt states she feels like she is fine and will wait a few more days to see how it plays out. I explained that if it is not better in a few days to call me and we can go with one of the other options at that time. Pt verbalizes understanding and has no further questions.

## 2018-02-08 NOTE — TELEPHONE ENCOUNTER
"----- Message from Brittney Ryan sent at 2/8/2018  9:57 AM PST -----  Regarding: Possible UTI after meds  Contact: [phone]  Pt states she might have UIT after taking meds - was told to call if anything \"funny\" happens  "

## 2018-03-16 ENCOUNTER — TELEPHONE (OUTPATIENT)
Dept: MEDICAL GROUP | Age: 38
End: 2018-03-16

## 2018-03-16 NOTE — TELEPHONE ENCOUNTER
Was the patient seen in the last year in this department? Yes     Does patient have an active prescription for medications requested? No     Received Request Via: Patient       Pt called requesting a refill for Adderall 10mg and 20mg XR she states the pharmacy only accepts 90day refill

## 2018-03-29 ENCOUNTER — OFFICE VISIT (OUTPATIENT)
Dept: MEDICAL GROUP | Age: 38
End: 2018-03-29
Payer: COMMERCIAL

## 2018-03-29 VITALS
DIASTOLIC BLOOD PRESSURE: 76 MMHG | HEIGHT: 62 IN | BODY MASS INDEX: 30.73 KG/M2 | SYSTOLIC BLOOD PRESSURE: 124 MMHG | WEIGHT: 167 LBS | TEMPERATURE: 98.8 F | HEART RATE: 64 BPM | OXYGEN SATURATION: 96 %

## 2018-03-29 DIAGNOSIS — F90.0 ATTENTION DEFICIT HYPERACTIVITY DISORDER (ADHD), PREDOMINANTLY INATTENTIVE TYPE: ICD-10-CM

## 2018-03-29 DIAGNOSIS — E66.9 OBESITY (BMI 30-39.9): ICD-10-CM

## 2018-03-29 DIAGNOSIS — E55.9 VITAMIN D DEFICIENCY DISEASE: ICD-10-CM

## 2018-03-29 DIAGNOSIS — F41.9 ANXIETY: ICD-10-CM

## 2018-03-29 DIAGNOSIS — E78.2 MIXED HYPERLIPIDEMIA: ICD-10-CM

## 2018-03-29 DIAGNOSIS — G44.209 TENSION HEADACHE: ICD-10-CM

## 2018-03-29 DIAGNOSIS — M62.838 MUSCLE SPASM: ICD-10-CM

## 2018-03-29 DIAGNOSIS — Z88.9 MULTIPLE ALLERGIES: ICD-10-CM

## 2018-03-29 DIAGNOSIS — G43.109 MIGRAINE WITH AURA AND WITHOUT STATUS MIGRAINOSUS, NOT INTRACTABLE: ICD-10-CM

## 2018-03-29 DIAGNOSIS — R11.0 NAUSEA: ICD-10-CM

## 2018-03-29 PROCEDURE — 99215 OFFICE O/P EST HI 40 MIN: CPT | Performed by: INTERNAL MEDICINE

## 2018-03-29 RX ORDER — DEXTROAMPHETAMINE SACCHARATE, AMPHETAMINE ASPARTATE MONOHYDRATE, DEXTROAMPHETAMINE SULFATE AND AMPHETAMINE SULFATE 5; 5; 5; 5 MG/1; MG/1; MG/1; MG/1
20 CAPSULE, EXTENDED RELEASE ORAL EVERY MORNING
Qty: 90 CAP | Refills: 0 | Status: SHIPPED | OUTPATIENT
Start: 2018-03-29 | End: 2018-04-05 | Stop reason: SDUPTHER

## 2018-03-29 RX ORDER — CYCLOBENZAPRINE HCL 10 MG
10 TABLET ORAL 3 TIMES DAILY PRN
Qty: 30 TAB | Refills: 5 | Status: SHIPPED | OUTPATIENT
Start: 2018-03-29 | End: 2018-12-21 | Stop reason: SDUPTHER

## 2018-03-29 RX ORDER — BUTALBITAL, ACETAMINOPHEN AND CAFFEINE 50; 325; 40 MG/1; MG/1; MG/1
1 TABLET ORAL EVERY 4 HOURS PRN
Qty: 30 TAB | Refills: 5 | Status: SHIPPED | OUTPATIENT
Start: 2018-03-29 | End: 2018-12-21 | Stop reason: SDUPTHER

## 2018-03-29 RX ORDER — DIAZEPAM 2 MG/1
2 TABLET ORAL EVERY 6 HOURS PRN
Qty: 30 TAB | Refills: 5 | Status: SHIPPED | OUTPATIENT
Start: 2018-03-29 | End: 2018-04-28

## 2018-03-29 RX ORDER — DEXTROAMPHETAMINE SACCHARATE, AMPHETAMINE ASPARTATE, DEXTROAMPHETAMINE SULFATE AND AMPHETAMINE SULFATE 2.5; 2.5; 2.5; 2.5 MG/1; MG/1; MG/1; MG/1
10 TABLET ORAL DAILY
Qty: 90 TAB | Refills: 0 | Status: SHIPPED | OUTPATIENT
Start: 2018-03-29 | End: 2018-04-05 | Stop reason: SDUPTHER

## 2018-03-29 ASSESSMENT — ENCOUNTER SYMPTOMS
GASTROINTESTINAL NEGATIVE: 1
NEUROLOGICAL NEGATIVE: 1
EYES NEGATIVE: 1
MUSCULOSKELETAL NEGATIVE: 1
RESPIRATORY NEGATIVE: 1
PSYCHIATRIC NEGATIVE: 1
CONSTITUTIONAL NEGATIVE: 1
CARDIOVASCULAR NEGATIVE: 1

## 2018-03-30 NOTE — PROGRESS NOTES
Subjective:      Sara Taylor is a 37 y.o. female who presents with GI Problem and Allergic Rhinitis  The patient is here for followup of chronic medical problems listed below. The patient is compliant with medications and having no side effects from them. Denies chest pain, abdominal pain, dyspnea, myalgias, or cough.     The patient is seen for several months and wants to restart her ADD treatment medication, also relaxants, and headache treatments. Also her anxiety medications. She did try to get pregnant without success. Has been under increased stress.    Additional new concerns or midepigastric pain and nausea postprandially. She feels she may have intestinal allergies or food allergies was rechecked after this. She did check for gallbladder disease or peptic ulcer disease. She is not taking any PPIs but is on H2 blockers and Tums with good effect.         Outpatient Medications Prior to Visit   Medication Sig Dispense Refill   • Cyanocobalamin (VITAMIN B-12) 1000 MCG/15ML Liquid Take  by mouth.     • ferrous sulfate 325 (65 FE) MG tablet Take 1 Tab by mouth every morning with breakfast. 30 Tab 3   • Prenatal Multivit-Min-Fe-FA (PRENATAL VITAMINS PO) Take  by mouth.     • clomiPHENE (CLOMID) 50 MG tablet Take 1 Tab by mouth every day. (Patient not taking: Reported on 3/29/2018) 5 Tab 0   • diazepam (VALIUM) 5 MG Tab TAKE 1 TABLET BY MOUTH AT BEDTIME AS NEEDED (PELVIC CRAMPS) 30 Tab 0   • cyclobenzaprine (FLEXERIL) 10 MG Tab Take 1 Tab by mouth 3 times a day as needed. 30 Tab 0   • amphetamine-dextroamphetamine (ADDERALL) 10 MG Tab Take 1 Tab by mouth every day for 30 days. 30 Tab 0   • amphetamine-dextroamphetamine (ADDERALL XR, 20MG,) 20 MG per XR capsule Take 1 Cap by mouth every morning for 30 days. 30 Cap 0   • acetaminophen/caffeine/butalbital 325-40-50 mg (FIORICET) -40 MG Tab Take 1 Tab by mouth every four hours as needed. FOR HEADACHE 30 Tab 3   • Cholecalciferol (VITAMIN D3) 5000 UNITS Tab Take   by mouth.     • Misc. Devices (BREAST PUMP) Misc 1 Device by Apply externally route every day. (Patient not taking: Reported on 3/29/2018) 1 Each 0     No facility-administered medications prior to visit.              Patient Active Problem List    Diagnosis Date Noted   • Anxiety 03/29/2018   • Muscle spasm 03/29/2018   • Tension headache 03/29/2018   • Obesity (BMI 30-39.9) 03/29/2018   • Attention deficit hyperactivity disorder (ADHD), predominantly inattentive type 03/29/2018   • Nausea 03/29/2018   • Multiple allergies 03/29/2018   • Vitamin D deficiency disease 08/15/2016   • Migraine with aura and without status migrainosus, not intractable 06/09/2015   • Mixed hyperlipidemia 10/07/2013     Patient has no known allergies.  Outpatient Medications Prior to Visit   Medication Sig Dispense Refill   • Cyanocobalamin (VITAMIN B-12) 1000 MCG/15ML Liquid Take  by mouth.     • ferrous sulfate 325 (65 FE) MG tablet Take 1 Tab by mouth every morning with breakfast. 30 Tab 3   •        •    5 Tab 0   •    30 Tab 0   • cyclobenzaprine (FLEXERIL) 10 MG Tab Take 1 Tab by mouth 3 times a day as needed. 30 Tab 0   • amphetamine-dextroamphetamine (ADDERALL) 10 MG Tab Take 1 Tab by mouth every day for 30 days. 30 Tab 0   • amphetamine-dextroamphetamine (ADDERALL XR, 20MG,) 20 MG per XR capsule Take 1 Cap by mouth every morning for 30 days. 30 Cap 0   • acetaminophen/caffeine/butalbital 325-40-50 mg (FIORICET) -40 MG Tab Take 1 Tab by mouth every four hours as needed. FOR HEADACHE 30 Tab 3   • Cholecalciferol (VITAMIN D3) 5000 UNITS Tab Take  by mouth.     •    1 Each 0     No facility-administered medications prior to visit.                HPI    Review of Systems   Constitutional: Negative.    HENT: Negative.    Eyes: Negative.    Respiratory: Negative.    Cardiovascular: Negative.    Gastrointestinal: Negative.    Genitourinary: Negative.    Musculoskeletal: Negative.    Skin: Negative.    Neurological: Negative.   "  Endo/Heme/Allergies: Negative.    Psychiatric/Behavioral: Negative.           Objective:     /76   Pulse 64   Temp 37.1 °C (98.8 °F)   Ht 1.575 m (5' 2\")   Wt 75.8 kg (167 lb)   SpO2 96%   BMI 30.54 kg/m²      Physical Exam   Constitutional: She is oriented to person, place, and time. She appears well-developed and well-nourished. No distress.   HENT:   Head: Normocephalic and atraumatic.   Right Ear: External ear normal.   Left Ear: External ear normal.   Nose: Nose normal.   Mouth/Throat: Oropharynx is clear and moist. No oropharyngeal exudate.   Eyes: Conjunctivae and EOM are normal. Pupils are equal, round, and reactive to light. Right eye exhibits no discharge. Left eye exhibits no discharge. No scleral icterus.   Neck: Normal range of motion. Neck supple. No JVD present. No tracheal deviation present. No thyromegaly present.   Cardiovascular: Normal rate, regular rhythm, normal heart sounds and intact distal pulses.  Exam reveals no gallop and no friction rub.    No murmur heard.  Pulmonary/Chest: Effort normal and breath sounds normal. No stridor. No respiratory distress. She has no wheezes. She has no rales. She exhibits no tenderness.   Abdominal: Soft. Bowel sounds are normal. She exhibits no distension and no mass. There is no tenderness. There is no rebound and no guarding.   Musculoskeletal: Normal range of motion. She exhibits no edema or tenderness.   Lymphadenopathy:     She has no cervical adenopathy.   Neurological: She is alert and oriented to person, place, and time. She has normal reflexes. She displays normal reflexes. No cranial nerve deficit. She exhibits normal muscle tone. Coordination normal.   Skin: Skin is warm and dry. No rash noted. She is not diaphoretic. No erythema. No pallor.   Psychiatric: She has a normal mood and affect. Her behavior is normal. Judgment and thought content normal.   Vitals reviewed.           No visits with results within 1 Month(s) from this " visit.   Latest known visit with results is:   Hospital Outpatient Visit on 01/25/2018   Component Date Value   • Cytology Reg 01/25/2018 See Path Report    • Source 01/25/2018 Endo/Cervical    • HPV Genotype 16 01/25/2018 Negative    • HPV Genotype 18 01/25/2018 Negative    • HPV Other High Risk Aurelia* 01/25/2018 Negative       No results found for: HBA1C  Lab Results   Component Value Date/Time    SODIUM 138 11/29/2016 12:13 PM    POTASSIUM 4.2 11/29/2016 12:13 PM    CHLORIDE 103 11/29/2016 12:13 PM    CO2 29 11/29/2016 12:13 PM    GLUCOSE 84 11/29/2016 12:13 PM    BUN 10 11/29/2016 12:13 PM    CREATININE 0.82 11/29/2016 12:13 PM    ALKPHOSPHAT 81 11/29/2016 12:13 PM    ASTSGOT 16 11/29/2016 12:13 PM    ALTSGPT 18 11/29/2016 12:13 PM    TBILIRUBIN 0.9 11/29/2016 12:13 PM     No results found for: INR  Lab Results   Component Value Date/Time    CHOLSTRLTOT 225 (H) 11/29/2016 12:13 PM     (H) 11/29/2016 12:13 PM    HDL 74 11/29/2016 12:13 PM    TRIGLYCERIDE 87 11/29/2016 12:13 PM       No results found for: TESTOSTERONE  No results found for: TSH  Lab Results   Component Value Date/Time    FREET4 0.98 09/09/2013 11:26 AM     No results found for: URICACID  No components found for: VITB12  Lab Results   Component Value Date/Time    25HYDROXY 22 (L) 11/29/2016 12:13 PM       Assessment/Plan:     1. Attention deficit hyperactivity disorder (ADHD), predominantly inattentive type     Not at goal. Marlin Has worked for her in the past. She will go back on her Adderall as previously prescribed.      - amphetamine-dextroamphetamine (ADDERALL) 10 MG Tab; Take 1 Tab by mouth every day for 90 days.  Dispense: 90 Tab; Refill: 0  - amphetamine-dextroamphetamine (ADDERALL XR, 20MG,) 20 MG per XR capsule; Take 1 Cap by mouth every morning for 90 days.  Dispense: 90 Cap; Refill: 0    2. Nausea-new problem. Rule out peptic ulcer disease and celiac disease or bladder disease. Continue with H2 blocker.      - US-ABDOMEN COMPLETE  SURVEY; Future  - CELIAC DISEASE AB PANEL; Future  - H. PYLORI BREATH TEST    3. Multiple allergies-new problem. Referral allergist.       - REFERRAL TO ALLERGY  - ALLERGEN, FOOD INCLUSIVE PANEL; Future    4. Mixed hyperlipidemia-out at goal. Needs to be rechecked. Diet and exercise. Patient counseled. Regarding proper diet including high-protein low-carb and calorie restriction somewhat 2000 nicky a day. Instructed on proper strengthening and aerobic conditioning exercises.      diet/exercise/lose 15 lbs.; patient counseled    - TSH; Future  - COMP METABOLIC PANEL; Future  - LIPID PROFILE; Future  - CBC WITH DIFFERENTIAL; Future    5. Anxiety-medical. Resume low-dose benzo. Use judiciously. She counseled. May need referral.  - diazePAM (VALIUM) 2 MG Tab; Take 1 Tab by mouth every 6 hours as needed for Anxiety for up to 30 days.  Dispense: 30 Tab; Refill: 5    6. Muscle spasm-medical. Discussed stretching and heat and anti-inflammatories when necessary. Trial of muscle relaxants are not very. Restricted basis.  - cyclobenzaprine (FLEXERIL) 10 MG Tab; Take 1 Tab by mouth 3 times a day as needed.  Dispense: 30 Tab; Refill: 5    7. Tension headache -at goal. Resume furosemide. On discussion regarding stress reducing techniques.         - acetaminophen/caffeine/butalbital 325-40-50 mg (FIORICET) -40 MG Tab; Take 1 Tab by mouth every four hours as needed for Headache.  Dispense: 30 Tab; Refill: 5    8. Obesity (BMI 30-39.9)    diet/exercise/lose 15 lbs.; patient counseled. See above.  - Patient identified as having weight management issue.  Appropriate orders and counseling given.    9. Vitamin D deficiency disease-not at goal.. Resume vitamin D.     - Cholecalciferol (VITAMIN D3) 5000 units Tab; Take  by mouth.  Dispense: 30 Tab    10. Migraine with aura and without status migrainosus, not intractable    As per tension headaches. May need Imitrex in the future. We'll discuss on follow-up.      40 minute face-to-face  encounter took place today.  More than half of this time was spent in the coordination of care of the above problems, as well as counseling.

## 2018-04-03 ENCOUNTER — TELEPHONE (OUTPATIENT)
Dept: MEDICAL GROUP | Age: 38
End: 2018-04-03

## 2018-04-03 NOTE — TELEPHONE ENCOUNTER
MEDICATION PRIOR AUTHORIZATION NEEDED:    1. Name of Medication: amphetamine-dextroamphetamine (ADDERALL) 10 MG Tab    2. Requested By (Name of Pharmacy):   [id number] - [place], [place] - [address]  [address]  [place] [address]  Phone: [phone] Fax: [phone]    3. Is insurance on file current? Cover My Med[employer] [id number]    4. What is the name & phone number of the 3rd party payor? [facility]

## 2018-04-05 ENCOUNTER — TELEPHONE (OUTPATIENT)
Dept: MEDICAL GROUP | Age: 38
End: 2018-04-05

## 2018-04-05 DIAGNOSIS — F90.0 ATTENTION DEFICIT HYPERACTIVITY DISORDER (ADHD), PREDOMINANTLY INATTENTIVE TYPE: ICD-10-CM

## 2018-04-05 RX ORDER — DEXTROAMPHETAMINE SACCHARATE, AMPHETAMINE ASPARTATE MONOHYDRATE, DEXTROAMPHETAMINE SULFATE AND AMPHETAMINE SULFATE 5; 5; 5; 5 MG/1; MG/1; MG/1; MG/1
20 CAPSULE, EXTENDED RELEASE ORAL EVERY MORNING
Qty: 30 CAP | Refills: 0 | Status: SHIPPED | OUTPATIENT
Start: 2018-06-05 | End: 2018-05-03 | Stop reason: SDUPTHER

## 2018-04-05 RX ORDER — DEXTROAMPHETAMINE SACCHARATE, AMPHETAMINE ASPARTATE, DEXTROAMPHETAMINE SULFATE AND AMPHETAMINE SULFATE 2.5; 2.5; 2.5; 2.5 MG/1; MG/1; MG/1; MG/1
10 TABLET ORAL DAILY
Qty: 30 TAB | Refills: 0 | Status: SHIPPED | OUTPATIENT
Start: 2018-05-05 | End: 2018-05-03 | Stop reason: SDUPTHER

## 2018-04-05 RX ORDER — DEXTROAMPHETAMINE SACCHARATE, AMPHETAMINE ASPARTATE MONOHYDRATE, DEXTROAMPHETAMINE SULFATE AND AMPHETAMINE SULFATE 5; 5; 5; 5 MG/1; MG/1; MG/1; MG/1
20 CAPSULE, EXTENDED RELEASE ORAL EVERY MORNING
Qty: 30 CAP | Refills: 0 | Status: SHIPPED | OUTPATIENT
Start: 2018-04-05 | End: 2018-09-27 | Stop reason: SDUPTHER

## 2018-04-05 RX ORDER — DEXTROAMPHETAMINE SACCHARATE, AMPHETAMINE ASPARTATE, DEXTROAMPHETAMINE SULFATE AND AMPHETAMINE SULFATE 2.5; 2.5; 2.5; 2.5 MG/1; MG/1; MG/1; MG/1
10 TABLET ORAL DAILY
Qty: 30 TAB | Refills: 0 | Status: SHIPPED | OUTPATIENT
Start: 2018-04-05 | End: 2018-05-03

## 2018-04-05 RX ORDER — DEXTROAMPHETAMINE SACCHARATE, AMPHETAMINE ASPARTATE, DEXTROAMPHETAMINE SULFATE AND AMPHETAMINE SULFATE 2.5; 2.5; 2.5; 2.5 MG/1; MG/1; MG/1; MG/1
10 TABLET ORAL DAILY
Qty: 30 TAB | Refills: 0 | Status: SHIPPED | OUTPATIENT
Start: 2018-06-05 | End: 2018-07-05

## 2018-04-05 RX ORDER — DEXTROAMPHETAMINE SACCHARATE, AMPHETAMINE ASPARTATE MONOHYDRATE, DEXTROAMPHETAMINE SULFATE AND AMPHETAMINE SULFATE 5; 5; 5; 5 MG/1; MG/1; MG/1; MG/1
20 CAPSULE, EXTENDED RELEASE ORAL EVERY MORNING
Qty: 30 CAP | Refills: 0 | Status: SHIPPED | OUTPATIENT
Start: 2018-05-05 | End: 2018-06-04

## 2018-04-05 NOTE — TELEPHONE ENCOUNTER
Phone Number Called: [phone] (home)     Message: Pt informed that scripts are ready to be picked up. Scripts placed at the .    Left Message for patient to call back: no

## 2018-04-05 NOTE — TELEPHONE ENCOUNTER
1. Caller Name: Sara Taylor                                         Call Back Number: [phone] (home)       Patient approves a detailed voicemail message: yes    Pt states that she can not afford to pay for the amphetamine-dextroamphetamine (ADDERALL) 10 MG Tab for a 90 days supply. She is requesting that you write 3 different scripts for a 30 days supply so that her co-pay will be cheaper.

## 2018-04-27 ENCOUNTER — HOSPITAL ENCOUNTER (OUTPATIENT)
Dept: LAB | Facility: MEDICAL CENTER | Age: 38
End: 2018-04-27
Attending: INTERNAL MEDICINE
Payer: COMMERCIAL

## 2018-04-27 DIAGNOSIS — R11.0 NAUSEA: ICD-10-CM

## 2018-04-27 DIAGNOSIS — Z88.9 MULTIPLE ALLERGIES: ICD-10-CM

## 2018-04-27 DIAGNOSIS — E78.2 MIXED HYPERLIPIDEMIA: ICD-10-CM

## 2018-04-27 LAB
ALBUMIN SERPL BCP-MCNC: 4.2 G/DL (ref 3.2–4.9)
ALBUMIN/GLOB SERPL: 1.3 G/DL
ALP SERPL-CCNC: 63 U/L (ref 30–99)
ALT SERPL-CCNC: 18 U/L (ref 2–50)
ANION GAP SERPL CALC-SCNC: 8 MMOL/L (ref 0–11.9)
AST SERPL-CCNC: 17 U/L (ref 12–45)
BASOPHILS # BLD AUTO: 0.6 % (ref 0–1.8)
BASOPHILS # BLD: 0.04 K/UL (ref 0–0.12)
BILIRUB SERPL-MCNC: 0.8 MG/DL (ref 0.1–1.5)
BUN SERPL-MCNC: 11 MG/DL (ref 8–22)
CALCIUM SERPL-MCNC: 9.2 MG/DL (ref 8.5–10.5)
CHLORIDE SERPL-SCNC: 103 MMOL/L (ref 96–112)
CHOLEST SERPL-MCNC: 228 MG/DL (ref 100–199)
CO2 SERPL-SCNC: 25 MMOL/L (ref 20–33)
CREAT SERPL-MCNC: 0.74 MG/DL (ref 0.5–1.4)
EOSINOPHIL # BLD AUTO: 0.14 K/UL (ref 0–0.51)
EOSINOPHIL NFR BLD: 2.1 % (ref 0–6.9)
ERYTHROCYTE [DISTWIDTH] IN BLOOD BY AUTOMATED COUNT: 39.3 FL (ref 35.9–50)
GLOBULIN SER CALC-MCNC: 3.2 G/DL (ref 1.9–3.5)
GLUCOSE SERPL-MCNC: 86 MG/DL (ref 65–99)
HCT VFR BLD AUTO: 44.1 % (ref 37–47)
HDLC SERPL-MCNC: 61 MG/DL
HGB BLD-MCNC: 15.6 G/DL (ref 12–16)
IMM GRANULOCYTES # BLD AUTO: 0.02 K/UL (ref 0–0.11)
IMM GRANULOCYTES NFR BLD AUTO: 0.3 % (ref 0–0.9)
LDLC SERPL CALC-MCNC: 152 MG/DL
LYMPHOCYTES # BLD AUTO: 2.69 K/UL (ref 1–4.8)
LYMPHOCYTES NFR BLD: 39.7 % (ref 22–41)
MCH RBC QN AUTO: 31.9 PG (ref 27–33)
MCHC RBC AUTO-ENTMCNC: 35.4 G/DL (ref 33.6–35)
MCV RBC AUTO: 90.2 FL (ref 81.4–97.8)
MONOCYTES # BLD AUTO: 0.41 K/UL (ref 0–0.85)
MONOCYTES NFR BLD AUTO: 6.1 % (ref 0–13.4)
NEUTROPHILS # BLD AUTO: 3.47 K/UL (ref 2–7.15)
NEUTROPHILS NFR BLD: 51.2 % (ref 44–72)
NRBC # BLD AUTO: 0 K/UL
NRBC BLD-RTO: 0 /100 WBC
PLATELET # BLD AUTO: 195 K/UL (ref 164–446)
PMV BLD AUTO: 10.9 FL (ref 9–12.9)
POTASSIUM SERPL-SCNC: 3.9 MMOL/L (ref 3.6–5.5)
PROT SERPL-MCNC: 7.4 G/DL (ref 6–8.2)
RBC # BLD AUTO: 4.89 M/UL (ref 4.2–5.4)
SODIUM SERPL-SCNC: 136 MMOL/L (ref 135–145)
TRIGL SERPL-MCNC: 75 MG/DL (ref 0–149)
TSH SERPL DL<=0.005 MIU/L-ACNC: 1.92 UIU/ML (ref 0.38–5.33)
WBC # BLD AUTO: 6.8 K/UL (ref 4.8–10.8)

## 2018-04-27 PROCEDURE — 80061 LIPID PANEL: CPT

## 2018-04-27 PROCEDURE — 36415 COLL VENOUS BLD VENIPUNCTURE: CPT

## 2018-04-27 PROCEDURE — 84443 ASSAY THYROID STIM HORMONE: CPT

## 2018-04-27 PROCEDURE — 80053 COMPREHEN METABOLIC PANEL: CPT

## 2018-04-27 PROCEDURE — 82784 ASSAY IGA/IGD/IGG/IGM EACH: CPT

## 2018-04-27 PROCEDURE — 83013 H PYLORI (C-13) BREATH: CPT

## 2018-04-27 PROCEDURE — 85025 COMPLETE CBC W/AUTO DIFF WBC: CPT

## 2018-04-27 PROCEDURE — 86003 ALLG SPEC IGE CRUDE XTRC EA: CPT | Mod: 91

## 2018-04-27 PROCEDURE — 83516 IMMUNOASSAY NONANTIBODY: CPT

## 2018-04-29 ENCOUNTER — HOSPITAL ENCOUNTER (OUTPATIENT)
Dept: RADIOLOGY | Facility: MEDICAL CENTER | Age: 38
End: 2018-04-29
Attending: INTERNAL MEDICINE
Payer: COMMERCIAL

## 2018-04-29 DIAGNOSIS — R11.0 NAUSEA: ICD-10-CM

## 2018-04-29 LAB — UREA BREATH TEST QL: NEGATIVE

## 2018-04-29 PROCEDURE — 76700 US EXAM ABDOM COMPLETE: CPT

## 2018-04-30 ENCOUNTER — TELEPHONE (OUTPATIENT)
Dept: MEDICAL GROUP | Age: 38
End: 2018-04-30

## 2018-04-30 LAB — IGA SERPL-MCNC: 312 MG/DL (ref 68–408)

## 2018-04-30 NOTE — TELEPHONE ENCOUNTER
Phone Number Called: Sara Taylor       Message: please call for test results    Left Message for patient to call back: yes

## 2018-05-01 LAB
ALMOND IGE QN: 0.28 KU/L
ASPARAGUS IGE QN: 0.36 KU/L
AVOCADO IGE QN: 0.34 KU/L
BAKER'S YEAST IGE QN: <0.1 KU/L
BANANA IGE QN: 0.31 KU/L
BASIL IGE QN: <0.1 KU/L
BAYLEAF IGE QN: <0.1 KU/L
BEEF IGE QN: <0.1 KU/L
BEET IGE QN: 0.3 KU/L
BELL PEPPER IGE QN: 0.32 KU/L
BLACK PEPPER IGE QN: 0.21 KU/L
BLUE MUSSEL IGE QN: <0.1 KU/L
BLUEBERRY IGE QN: <0.1 KU/L
BRAZIL NUT IGE QN: 0.18 KU/L
BROCCOLI IGE QN: 0.28 KU/L
BUCKWHEAT IGE QN: 0.35 KU/L
CABBAGE IGE QN: 0.33 KU/L
CARROT IGE QN: 0.28 KU/L
CASHEW NUT IGE QN: 0.13 KU/L
CHESTNUT IGE QN: 0.35 KU/L
CHICKPEA IGE AB [UNITS/VOLUME] IN SERUM: 0.33 KU/L
CHOCOLATE IGE QN: <0.1 KU/L
CINNAMON IGE QN: <0.1 KU/L
CLAM IGE QN: 0.14 KU/L
COCONUT IGE QN: 0.24 KU/L
CODFISH IGE QN: <0.1 KU/L
COW MILK IGE QN: <0.1 KU/L
CRAB IGE QN: 0.15 KU/L
CUCUMBER IGE QN: 0.39 KU/L
CULTIVATED COTTON IGE QN: <0.1 KU/L
DEPRECATED MISC ALLERGEN IGE RAST QL: ABNORMAL
DILL IGE QN: 0.31 KU/L
EGG WHITE IGE QN: <0.1 KU/L
GINGER IGE QN: 0.34 KU/L
GRAPE IGE QN: 0.16 KU/L
HALIBUT IGE QN: <0.1 KU/L
HAZELNUT IGE QN: 0.32 KU/L
LETTUCE IGE QN: 0.3 KU/L
LIMA BEAN IGE QN: 0.39 KU/L
MELON IGE QN: 0.35 KU/L
ONION IGE QN: 0.31 KU/L
ORANGE IGE QN: 0.29 KU/L
OREGANO IGE QN: 0.12 KU/L
PEA IGE QN: 0.26 KU/L
PEANUT IGE QN: 0.32 KU/L
PEAR IGE QN: 0.29 KU/L
PLUM IGE QN: 0.26 KU/L
PORK IGE QN: 0.13 KU/L
POTATO IGE QN: 0.3 KU/L
RASPBERRY IGE QN: 0.3 KU/L
SESAME SEED IGE QN: 0.39 KU/L
SHRIMP IGE QN: 0.1 KU/L
SOYBEAN IGE QN: 0.28 KU/L
TEA IGE QN: <0.1 KU/L
TOMATO IGE QN: 0.37 KU/L
TROUT IGE QN: <0.1 KU/L
TTG IGA SER IA-ACNC: 1 U/ML (ref 0–3)
TURKEY MEAT IGE QN: <0.1 KU/L
WALNUT IGE QN: 0.25 KU/L
WATERMELON IGE QN: 0.36 KU/L

## 2018-05-03 ENCOUNTER — OFFICE VISIT (OUTPATIENT)
Dept: MEDICAL GROUP | Age: 38
End: 2018-05-03
Payer: COMMERCIAL

## 2018-05-03 VITALS
OXYGEN SATURATION: 95 % | HEART RATE: 105 BPM | BODY MASS INDEX: 29.26 KG/M2 | WEIGHT: 159 LBS | TEMPERATURE: 98.6 F | HEIGHT: 62 IN | DIASTOLIC BLOOD PRESSURE: 82 MMHG | SYSTOLIC BLOOD PRESSURE: 128 MMHG

## 2018-05-03 DIAGNOSIS — E66.9 OBESITY (BMI 30-39.9): ICD-10-CM

## 2018-05-03 DIAGNOSIS — R10.9 CHRONIC ABDOMINAL PAIN: ICD-10-CM

## 2018-05-03 DIAGNOSIS — G89.29 CHRONIC ABDOMINAL PAIN: ICD-10-CM

## 2018-05-03 DIAGNOSIS — K59.04 CHRONIC IDIOPATHIC CONSTIPATION: ICD-10-CM

## 2018-05-03 DIAGNOSIS — D48.5 NEOPLASM OF UNCERTAIN BEHAVIOR OF SKIN: ICD-10-CM

## 2018-05-03 DIAGNOSIS — R10.9 CHRONIC LEFT FLANK PAIN: ICD-10-CM

## 2018-05-03 DIAGNOSIS — E78.2 MIXED HYPERLIPIDEMIA: ICD-10-CM

## 2018-05-03 DIAGNOSIS — F90.0 ATTENTION DEFICIT HYPERACTIVITY DISORDER (ADHD), PREDOMINANTLY INATTENTIVE TYPE: ICD-10-CM

## 2018-05-03 DIAGNOSIS — Z80.0 FAMILY HISTORY OF COLON CANCER: ICD-10-CM

## 2018-05-03 DIAGNOSIS — R10.13 MIDEPIGASTRIC PAIN: ICD-10-CM

## 2018-05-03 DIAGNOSIS — G89.29 CHRONIC LEFT FLANK PAIN: ICD-10-CM

## 2018-05-03 PROCEDURE — 99214 OFFICE O/P EST MOD 30 MIN: CPT | Performed by: INTERNAL MEDICINE

## 2018-05-03 RX ORDER — DEXTROAMPHETAMINE SACCHARATE, AMPHETAMINE ASPARTATE, DEXTROAMPHETAMINE SULFATE AND AMPHETAMINE SULFATE 2.5; 2.5; 2.5; 2.5 MG/1; MG/1; MG/1; MG/1
10 TABLET ORAL DAILY
Qty: 30 TAB | Refills: 0 | Status: SHIPPED | OUTPATIENT
Start: 2018-05-05 | End: 2018-05-03 | Stop reason: SDUPTHER

## 2018-05-03 RX ORDER — DEXTROAMPHETAMINE SACCHARATE, AMPHETAMINE ASPARTATE, DEXTROAMPHETAMINE SULFATE AND AMPHETAMINE SULFATE 2.5; 2.5; 2.5; 2.5 MG/1; MG/1; MG/1; MG/1
10 TABLET ORAL DAILY
Qty: 30 TAB | Refills: 0 | Status: SHIPPED | OUTPATIENT
Start: 2018-07-05 | End: 2018-07-27 | Stop reason: SDUPTHER

## 2018-05-03 RX ORDER — DEXTROAMPHETAMINE SACCHARATE, AMPHETAMINE ASPARTATE MONOHYDRATE, DEXTROAMPHETAMINE SULFATE AND AMPHETAMINE SULFATE 5; 5; 5; 5 MG/1; MG/1; MG/1; MG/1
20 CAPSULE, EXTENDED RELEASE ORAL EVERY MORNING
Qty: 30 CAP | Refills: 0 | Status: SHIPPED | OUTPATIENT
Start: 2018-07-05 | End: 2018-09-27 | Stop reason: SDUPTHER

## 2018-05-03 ASSESSMENT — ENCOUNTER SYMPTOMS
NEUROLOGICAL NEGATIVE: 1
RESPIRATORY NEGATIVE: 1
CONSTITUTIONAL NEGATIVE: 1
PSYCHIATRIC NEGATIVE: 1
CARDIOVASCULAR NEGATIVE: 1
MUSCULOSKELETAL NEGATIVE: 1
GASTROINTESTINAL NEGATIVE: 1
EYES NEGATIVE: 1

## 2018-05-03 ASSESSMENT — PAIN SCALES - GENERAL: PAINLEVEL: 7=MODERATE-SEVERE PAIN

## 2018-05-04 NOTE — PROGRESS NOTES
Subjective:      Sara Taylor is a 37 y.o. female who presents with Allergic Rhinitis and GI Problem (x1 month)  The patient is here for followup of chronic medical problems listed below. The patient is compliant with medications and having no side effects from them. Denies chest pain, abdominal pain, dyspnea, myalgias, or cough.   Patient Active Problem List    Diagnosis Date Noted   • Chronic idiopathic constipation 05/03/2018   • Chronic left flank pain- musculoskeletal- ref to chiroparactor 05/03/2018   • Anxiety 03/29/2018   • Muscle spasm 03/29/2018   • Tension headache 03/29/2018   • Obesity (BMI 30-39.9) 03/29/2018   • Attention deficit hyperactivity disorder (ADHD), predominantly inattentive type 03/29/2018   • Nausea 03/29/2018   • Multiple allergies 03/29/2018   • Vitamin D deficiency disease 08/15/2016   • Migraine with aura and without status migrainosus, not intractable 06/09/2015   • Mixed hyperlipidemia 10/07/2013     Patient has no known allergies.  Outpatient Medications Prior to Visit   Medication Sig Dispense Refill   • amphetamine-dextroamphetamine (ADDERALL XR, 20MG,) 20 MG per XR capsule Take 1 Cap by mouth every morning for 30 days. 30 Cap 0   • Cholecalciferol (VITAMIN D3) 5000 units Tab Take  by mouth. 30 Tab    • Cyanocobalamin (VITAMIN B-12) 1000 MCG/15ML Liquid Take  by mouth.     • ferrous sulfate 325 (65 FE) MG tablet Take 1 Tab by mouth every morning with breakfast. 30 Tab 3   • Prenatal Multivit-Min-Fe-FA (PRENATAL VITAMINS PO) Take  by mouth.     • [START ON 5/5/2018] amphetamine-dextroamphetamine (ADDERALL XR, 20MG,) 20 MG per XR capsule Take 1 Cap by mouth every morning for 30 days. 30 Cap 0   • [START ON 6/5/2018] amphetamine-dextroamphetamine (ADDERALL) 10 MG Tab Take 1 Tab by mouth every day for 30 days. 30 Tab 0   • amphetamine-dextroamphetamine (ADDERALL) 10 MG Tab Take 1 Tab by mouth every day for 30 days. 30 Tab 0   • [START ON 5/5/2018] amphetamine-dextroamphetamine  "(ADDERALL) 10 MG Tab Take 1 Tab by mouth every day for 30 days. 30 Tab 0   • [START ON 6/5/2018] amphetamine-dextroamphetamine (ADDERALL XR, 20MG,) 20 MG per XR capsule Take 1 Cap by mouth every morning for 30 days. 30 Cap 0   • acetaminophen/caffeine/butalbital 325-40-50 mg (FIORICET) -40 MG Tab Take 1 Tab by mouth every four hours as needed for Headache. 30 Tab 5   • cyclobenzaprine (FLEXERIL) 10 MG Tab Take 1 Tab by mouth 3 times a day as needed. 30 Tab 5     No facility-administered medications prior to visit.                HPI    Review of Systems   Constitutional: Negative.    HENT: Negative.    Eyes: Negative.    Respiratory: Negative.    Cardiovascular: Negative.    Gastrointestinal: Negative.    Genitourinary: Negative.    Musculoskeletal: Negative.    Skin: Negative.    Neurological: Negative.    Endo/Heme/Allergies: Negative.    Psychiatric/Behavioral: Negative.           Objective:     /82   Pulse (!) 105   Temp 37 °C (98.6 °F)   Ht 1.575 m (5' 2.01\")   Wt 72.1 kg (159 lb)   SpO2 95%   BMI 29.07 kg/m²      Physical Exam   Constitutional: She is oriented to person, place, and time. She appears well-developed and well-nourished. No distress.   HENT:   Head: Normocephalic and atraumatic.   Right Ear: External ear normal.   Left Ear: External ear normal.   Nose: Nose normal.   Mouth/Throat: Oropharynx is clear and moist. No oropharyngeal exudate.   Eyes: Conjunctivae and EOM are normal. Pupils are equal, round, and reactive to light. Right eye exhibits no discharge. Left eye exhibits no discharge. No scleral icterus.   Neck: Normal range of motion. Neck supple. No JVD present. No tracheal deviation present. No thyromegaly present.   Cardiovascular: Normal rate, regular rhythm, normal heart sounds and intact distal pulses.  Exam reveals no gallop and no friction rub.    No murmur heard.  Pulmonary/Chest: Effort normal and breath sounds normal. No stridor. No respiratory distress. She has no " wheezes. She has no rales. She exhibits no tenderness.   Abdominal: Soft. Bowel sounds are normal. She exhibits no distension and no mass. There is no tenderness. There is no rebound and no guarding.   Musculoskeletal: Normal range of motion. She exhibits no edema or tenderness.   Lymphadenopathy:     She has no cervical adenopathy.   Neurological: She is alert and oriented to person, place, and time. She has normal reflexes. She displays normal reflexes. No cranial nerve deficit. She exhibits normal muscle tone. Coordination normal.   Skin: Skin is warm and dry. No rash noted. She is not diaphoretic. No erythema. No pallor.   Psychiatric: She has a normal mood and affect. Her behavior is normal. Judgment and thought content normal.   Vitals reviewed.         Hospital Outpatient Visit on 04/27/2018   Component Date Value   • Immunoglobulin A 04/27/2018 312    • TSH 04/27/2018 1.920    • Sodium 04/27/2018 136    • Potassium 04/27/2018 3.9    • Chloride 04/27/2018 103    • Co2 04/27/2018 25    • Anion Gap 04/27/2018 8.0    • Glucose 04/27/2018 86    • Bun 04/27/2018 11    • Creatinine 04/27/2018 0.74    • Calcium 04/27/2018 9.2    • AST(SGOT) 04/27/2018 17    • ALT(SGPT) 04/27/2018 18    • Alkaline Phosphatase 04/27/2018 63    • Total Bilirubin 04/27/2018 0.8    • Albumin 04/27/2018 4.2    • Total Protein 04/27/2018 7.4    • Globulin 04/27/2018 3.2    • A-G Ratio 04/27/2018 1.3    • Cholesterol,Tot 04/27/2018 228*   • Triglycerides 04/27/2018 75    • HDL 04/27/2018 61    • LDL 04/27/2018 152*   • WBC 04/27/2018 6.8    • RBC 04/27/2018 4.89    • Hemoglobin 04/27/2018 15.6    • Hematocrit 04/27/2018 44.1    • MCV 04/27/2018 90.2    • MCH 04/27/2018 31.9    • MCHC 04/27/2018 35.4*   • RDW 04/27/2018 39.3    • Platelet Count 04/27/2018 195    • MPV 04/27/2018 10.9    • Neutrophils-Polys 04/27/2018 51.20    • Lymphocytes 04/27/2018 39.70    • Monocytes 04/27/2018 6.10    • Eosinophils 04/27/2018 2.10    • Basophils  04/27/2018 0.60    • Immature Granulocytes 04/27/2018 0.30    • Nucleated RBC 04/27/2018 0.00    • Neutrophils (Absolute) 04/27/2018 3.47    • Lymphs (Absolute) 04/27/2018 2.69    • Monos (Absolute) 04/27/2018 0.41    • Eos (Absolute) 04/27/2018 0.14    • Baso (Absolute) 04/27/2018 0.04    • Immature Granulocytes (a* 04/27/2018 0.02    • NRBC (Absolute) 04/27/2018 0.00    • [place], IgE 04/27/2018 0.28    • Asparagus  F261 04/27/2018 0.36*   • F138 Avocado 04/27/2018 0.34    • [place] Yeast IgE 04/27/2018 <0.10    • F204 Banana 04/27/2018 0.31    • Basil  F269 04/27/2018 <0.10    • [place]   F278 04/27/2018 <0.10    • F27 Beef 04/27/2018 <0.10    • Beet Root, IgE 04/27/2018 0.30    • Pepper C. annuum, IgE 04/27/2018 0.32    • Black Pepper  F280 04/27/2018 0.21    • Mussel F037 04/27/2018 <0.10    • Blueberry  F288 04/27/2018 <0.10    • Brazil Nut  F018 04/27/2018 0.18    • Broccoli  F260 04/27/2018 0.28    • Buckwheat  F011 04/27/2018 0.35*   • Cabbage F216 04/27/2018 0.33    • F31 Carrot 04/27/2018 0.28    • Cashew Nut  F202 04/27/2018 0.13    • F299 Sweet [place] 04/27/2018 0.35*   • Chick Pea  F309 04/27/2018 0.33    • Chocolate Cacao, IgE F093 04/27/2018 <0.10    • Cinnamon  F220 04/27/2018 <0.10    • Clam  F207 04/27/2018 0.14    • Coconut 04/27/2018 0.24    • Codfish Allergen 04/27/2018 <0.10    • Occupational Cotton Seed* 04/27/2018 <0.10    • Crab  F023 04/27/2018 0.15    • Cucumber  F244 04/27/2018 0.39*   • Dill  F277 04/27/2018 0.31    • F1 Eggwhite 04/27/2018 <0.10    • Kezia  F270 04/27/2018 0.34    • Grape Allergen 04/27/2018 0.16    • Halibut  F303 04/27/2018 <0.10    • Hazelnut/Filbert  F017 04/27/2018 0.32    • Honeydew/Cantaloupe, IgE 04/27/2018 0.35*   • Lettuce F215 04/27/2018 0.30    • Aleman Brean/White Diaz, I* 04/27/2018 0.39*   • F2 Milk 04/27/2018 <0.10    • Onions  F048 04/27/2018 0.31    • [place] F033 04/27/2018 0.29    • Oregano  F283 04/27/2018 0.12    • F12 Pea 04/27/2018 0.26    •  F013 Peanut 04/27/2018 0.32    • Pear  F094 04/27/2018 0.29    • [place]  F255 04/27/2018 0.26    • Pork  F026 04/27/2018 0.13    • Potato 04/27/2018 0.30    • Raspberry, IgE F343 04/27/2018 0.30    • Sesame Seed 04/27/2018 0.39*   • Shrimp IgE 04/27/2018 0.10    • F14 Diaz Soybean 04/27/2018 0.28    • Tea  F222 04/27/2018 <0.10    • Tomato IgE 04/27/2018 0.37*   • Trout  F204 04/27/2018 <0.10    • Turkey F284 04/27/2018 <0.10    • [place]  F255 04/27/2018 0.25    • Watermelon 04/27/2018 0.36*   • Immunocap Score 04/27/2018 See Note    • Height 04/27/2018 62    • Weight 04/27/2018 167    • H Pylori Breath Test 04/27/2018 Negative    • GFR If  04/27/2018 >60    • GFR If Non  Ameri* 04/27/2018 >60    • t-TG IgA 04/27/2018 1       No results found for: HBA1C  Lab Results   Component Value Date/Time    SODIUM 136 04/27/2018 10:57 AM    POTASSIUM 3.9 04/27/2018 10:57 AM    CHLORIDE 103 04/27/2018 10:57 AM    CO2 25 04/27/2018 10:57 AM    GLUCOSE 86 04/27/2018 10:57 AM    BUN 11 04/27/2018 10:57 AM    CREATININE 0.74 04/27/2018 10:57 AM    ALKPHOSPHAT 63 04/27/2018 10:57 AM    ASTSGOT 17 04/27/2018 10:57 AM    ALTSGPT 18 04/27/2018 10:57 AM    TBILIRUBIN 0.8 04/27/2018 10:57 AM     No results found for: INR  Lab Results   Component Value Date/Time    CHOLSTRLTOT 228 (H) 04/27/2018 10:57 AM     (H) 04/27/2018 10:57 AM    HDL 61 04/27/2018 10:57 AM    TRIGLYCERIDE 75 04/27/2018 10:57 AM       No results found for: TESTOSTERONE  No results found for: TSH  Lab Results   Component Value Date/Time    FREET4 0.98 09/09/2013 11:26 AM     No results found for: URICACID  No components found for: VITB12  Lab Results   Component Value Date/Time    25HYDROXY 22 (L) 11/29/2016 12:13 PM          Assessment/Plan:     1. 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 Obesity (BMI 30-39.9)     diet/exercise/lose 15 lbs.; patient counseled      3. Attention deficit hyperactivity  disorder (ADHD), predominantly inattentive type     - amphetamine-dextroamphetamine (ADDERALL XR, 20MG,) 20 MG per XR capsule; Take 1 Cap by mouth every morning for 30 days.  Dispense: 30 Cap; Refill: 0  - amphetamine-dextroamphetamine (ADDERALL) 10 MG Tab; Take 1 Tab by mouth every day for 30 days.  Dispense: 30 Tab; Refill: 0    4. Mixed hyperlipidemia   Under good control. Continue same regimen.      5. Chronic idiopathic constipation    Under good control. Continue same regimen.    6. Family history of colon cancer     - REFERRAL TO GASTROENTEROLOGY    7. Chronic left flank pain   ref gi  8. Chronic abdominal pain   ref gi  9. Midepigastric pain         - REFERRAL TO GASTROENTEROLOGY    30 minute face-to-face encounter took place today.  More than half of this time was spent in the coordination of care of the above problems, as well as counseling.

## 2018-07-24 DIAGNOSIS — F90.0 ATTENTION DEFICIT HYPERACTIVITY DISORDER (ADHD), PREDOMINANTLY INATTENTIVE TYPE: ICD-10-CM

## 2018-07-24 NOTE — TELEPHONE ENCOUNTER
Phone Number Called: [phone] (home)       Message: patient lvm on 07/20/2018 to get Rx refills. Patient did not specify which Rx she needed. Called patient back to get more info, LMV for patient.    Left Message for patient to call back: yes

## 2018-07-25 NOTE — TELEPHONE ENCOUNTER
Phone Number Called: [phone] (home)       Message: Second call to patient regarding her voicemail about her medication refill, told her to give us a call back to clarify which medications she needs refilled.     Left Message for patient to call back: yes

## 2018-07-26 NOTE — TELEPHONE ENCOUNTER
Phone Number Called: [phone] (home)       Message: Called [facility] for patient to contact office, in regards to a medication refill patient was needing. Need to clarify medication patient is needing refills on.     Left Message for patient to call back: yes

## 2018-07-27 RX ORDER — DEXTROAMPHETAMINE SACCHARATE, AMPHETAMINE ASPARTATE MONOHYDRATE, DEXTROAMPHETAMINE SULFATE AND AMPHETAMINE SULFATE 5; 5; 5; 5 MG/1; MG/1; MG/1; MG/1
20 CAPSULE, EXTENDED RELEASE ORAL EVERY MORNING
Qty: 30 CAP | Refills: 0 | Status: CANCELLED
Start: 2018-07-27 | End: 2018-08-26

## 2018-07-28 NOTE — TELEPHONE ENCOUNTER
Pt would like a refill of amphetamine-dextroamphetamine (ADDERALL) 10 MG Tab     Was the patient seen in the last year in this department? Yes    Does patient have an active prescription for medications requested? Yes    Received Request Via: Patient

## 2018-07-30 RX ORDER — DEXTROAMPHETAMINE SACCHARATE, AMPHETAMINE ASPARTATE, DEXTROAMPHETAMINE SULFATE AND AMPHETAMINE SULFATE 2.5; 2.5; 2.5; 2.5 MG/1; MG/1; MG/1; MG/1
10 TABLET ORAL DAILY
Qty: 30 TAB | Refills: 0 | Status: SHIPPED
Start: 2018-07-30 | End: 2018-08-02 | Stop reason: SDUPTHER

## 2018-08-02 DIAGNOSIS — G44.209 TENSION HEADACHE: ICD-10-CM

## 2018-08-02 DIAGNOSIS — F90.0 ATTENTION DEFICIT HYPERACTIVITY DISORDER (ADHD), PREDOMINANTLY INATTENTIVE TYPE: ICD-10-CM

## 2018-08-02 RX ORDER — DEXTROAMPHETAMINE SACCHARATE, AMPHETAMINE ASPARTATE, DEXTROAMPHETAMINE SULFATE AND AMPHETAMINE SULFATE 2.5; 2.5; 2.5; 2.5 MG/1; MG/1; MG/1; MG/1
10 TABLET ORAL DAILY
Qty: 30 TAB | Refills: 0 | Status: SHIPPED | OUTPATIENT
Start: 2018-08-02 | End: 2018-09-27 | Stop reason: SDUPTHER

## 2018-09-04 DIAGNOSIS — F90.0 ATTENTION DEFICIT HYPERACTIVITY DISORDER (ADHD), PREDOMINANTLY INATTENTIVE TYPE: ICD-10-CM

## 2018-09-04 RX ORDER — DEXTROAMPHETAMINE SACCHARATE, AMPHETAMINE ASPARTATE MONOHYDRATE, DEXTROAMPHETAMINE SULFATE AND AMPHETAMINE SULFATE 5; 5; 5; 5 MG/1; MG/1; MG/1; MG/1
20 CAPSULE, EXTENDED RELEASE ORAL EVERY MORNING
Qty: 90 CAP | Refills: 0 | OUTPATIENT
Start: 2018-09-04 | End: 2018-10-04

## 2018-09-27 ENCOUNTER — OFFICE VISIT (OUTPATIENT)
Dept: MEDICAL GROUP | Age: 38
End: 2018-09-27
Payer: COMMERCIAL

## 2018-09-27 VITALS
WEIGHT: 152 LBS | HEART RATE: 73 BPM | BODY MASS INDEX: 27.97 KG/M2 | DIASTOLIC BLOOD PRESSURE: 76 MMHG | TEMPERATURE: 98.1 F | HEIGHT: 62 IN | OXYGEN SATURATION: 95 % | SYSTOLIC BLOOD PRESSURE: 120 MMHG

## 2018-09-27 DIAGNOSIS — E78.2 MIXED HYPERLIPIDEMIA: ICD-10-CM

## 2018-09-27 DIAGNOSIS — G43.109 MIGRAINE WITH AURA AND WITHOUT STATUS MIGRAINOSUS, NOT INTRACTABLE: ICD-10-CM

## 2018-09-27 DIAGNOSIS — F90.0 ATTENTION DEFICIT HYPERACTIVITY DISORDER (ADHD), PREDOMINANTLY INATTENTIVE TYPE: ICD-10-CM

## 2018-09-27 PROBLEM — R11.0 NAUSEA: Status: RESOLVED | Noted: 2018-03-29 | Resolved: 2018-09-27

## 2018-09-27 PROBLEM — R10.9 CHRONIC LEFT FLANK PAIN: Status: RESOLVED | Noted: 2018-05-03 | Resolved: 2018-09-27

## 2018-09-27 PROBLEM — G89.29 CHRONIC LEFT FLANK PAIN: Status: RESOLVED | Noted: 2018-05-03 | Resolved: 2018-09-27

## 2018-09-27 PROBLEM — M62.838 MUSCLE SPASM: Status: RESOLVED | Noted: 2018-03-29 | Resolved: 2018-09-27

## 2018-09-27 PROBLEM — E66.9 OBESITY (BMI 30-39.9): Status: RESOLVED | Noted: 2018-03-29 | Resolved: 2018-09-27

## 2018-09-27 PROBLEM — K59.04 CHRONIC IDIOPATHIC CONSTIPATION: Status: RESOLVED | Noted: 2018-05-03 | Resolved: 2018-09-27

## 2018-09-27 PROCEDURE — 99214 OFFICE O/P EST MOD 30 MIN: CPT | Performed by: INTERNAL MEDICINE

## 2018-09-27 RX ORDER — DEXTROAMPHETAMINE SACCHARATE, AMPHETAMINE ASPARTATE, DEXTROAMPHETAMINE SULFATE AND AMPHETAMINE SULFATE 2.5; 2.5; 2.5; 2.5 MG/1; MG/1; MG/1; MG/1
10 TABLET ORAL DAILY
Qty: 30 TAB | Refills: 0 | Status: SHIPPED | OUTPATIENT
Start: 2018-09-27 | End: 2018-10-27

## 2018-09-27 RX ORDER — DEXTROAMPHETAMINE SACCHARATE, AMPHETAMINE ASPARTATE, DEXTROAMPHETAMINE SULFATE AND AMPHETAMINE SULFATE 2.5; 2.5; 2.5; 2.5 MG/1; MG/1; MG/1; MG/1
5 TABLET ORAL 2 TIMES DAILY
COMMUNITY
End: 2018-09-27

## 2018-09-27 RX ORDER — DEXTROAMPHETAMINE SACCHARATE, AMPHETAMINE ASPARTATE, DEXTROAMPHETAMINE SULFATE AND AMPHETAMINE SULFATE 2.5; 2.5; 2.5; 2.5 MG/1; MG/1; MG/1; MG/1
10 TABLET ORAL DAILY
Qty: 30 TAB | Refills: 0 | Status: SHIPPED | OUTPATIENT
Start: 2018-11-27 | End: 2018-12-21 | Stop reason: SDUPTHER

## 2018-09-27 RX ORDER — DEXTROAMPHETAMINE SACCHARATE, AMPHETAMINE ASPARTATE MONOHYDRATE, DEXTROAMPHETAMINE SULFATE AND AMPHETAMINE SULFATE 5; 5; 5; 5 MG/1; MG/1; MG/1; MG/1
20 CAPSULE, EXTENDED RELEASE ORAL EVERY MORNING
Qty: 30 CAP | Refills: 0 | Status: SHIPPED | OUTPATIENT
Start: 2018-11-27 | End: 2018-12-21 | Stop reason: SDUPTHER

## 2018-09-27 RX ORDER — DEXTROAMPHETAMINE SACCHARATE, AMPHETAMINE ASPARTATE MONOHYDRATE, DEXTROAMPHETAMINE SULFATE AND AMPHETAMINE SULFATE 5; 5; 5; 5 MG/1; MG/1; MG/1; MG/1
20 CAPSULE, EXTENDED RELEASE ORAL EVERY MORNING
COMMUNITY
End: 2018-09-27

## 2018-09-27 RX ORDER — DEXTROAMPHETAMINE SACCHARATE, AMPHETAMINE ASPARTATE MONOHYDRATE, DEXTROAMPHETAMINE SULFATE AND AMPHETAMINE SULFATE 5; 5; 5; 5 MG/1; MG/1; MG/1; MG/1
20 CAPSULE, EXTENDED RELEASE ORAL EVERY MORNING
Qty: 30 CAP | Refills: 0 | Status: SHIPPED | OUTPATIENT
Start: 2018-10-27 | End: 2018-11-26

## 2018-09-27 RX ORDER — DEXTROAMPHETAMINE SACCHARATE, AMPHETAMINE ASPARTATE MONOHYDRATE, DEXTROAMPHETAMINE SULFATE AND AMPHETAMINE SULFATE 5; 5; 5; 5 MG/1; MG/1; MG/1; MG/1
20 CAPSULE, EXTENDED RELEASE ORAL EVERY MORNING
Qty: 30 CAP | Refills: 0 | Status: SHIPPED | OUTPATIENT
Start: 2018-09-27 | End: 2018-10-27

## 2018-09-27 RX ORDER — DEXTROAMPHETAMINE SACCHARATE, AMPHETAMINE ASPARTATE, DEXTROAMPHETAMINE SULFATE AND AMPHETAMINE SULFATE 2.5; 2.5; 2.5; 2.5 MG/1; MG/1; MG/1; MG/1
10 TABLET ORAL DAILY
Qty: 30 TAB | Refills: 1 | Status: SHIPPED | OUTPATIENT
Start: 2018-10-27 | End: 2018-11-27

## 2018-09-27 ASSESSMENT — ENCOUNTER SYMPTOMS
CONSTITUTIONAL NEGATIVE: 1
NEUROLOGICAL NEGATIVE: 1
PSYCHIATRIC NEGATIVE: 1
GASTROINTESTINAL NEGATIVE: 1
EYES NEGATIVE: 1
CARDIOVASCULAR NEGATIVE: 1
MUSCULOSKELETAL NEGATIVE: 1
RESPIRATORY NEGATIVE: 1

## 2018-09-27 ASSESSMENT — PATIENT HEALTH QUESTIONNAIRE - PHQ9: CLINICAL INTERPRETATION OF PHQ2 SCORE: 0

## 2018-09-28 NOTE — PROGRESS NOTES
Subjective:      Sara Taylor is a 38 y.o. female who presents with Medication Management (adderral)  The patient is here for followup of chronic medical problems listed below. The patient is compliant with medications and having no side effects from them. Denies chest pain, abdominal pain, dyspnea, myalgias, or cough.   Patient Active Problem List    Diagnosis Date Noted   • Anxiety 03/29/2018   • Tension headache 03/29/2018   • Attention deficit hyperactivity disorder (ADHD), predominantly inattentive type 03/29/2018   • Multiple allergies 03/29/2018   • Vitamin D deficiency disease 08/15/2016   • Migraine with aura and without status migrainosus, not intractable 06/09/2015   • Mixed hyperlipidemia 10/07/2013     No Known Allergies  Outpatient Medications Prior to Visit   Medication Sig Dispense Refill   • acetaminophen/caffeine/butalbital 325-40-50 mg (FIORICET) -40 MG Tab Take 1 Tab by mouth every four hours as needed for Headache. 30 Tab 5   • cyclobenzaprine (FLEXERIL) 10 MG Tab Take 1 Tab by mouth 3 times a day as needed. 30 Tab 5   • Cholecalciferol (VITAMIN D3) 5000 units Tab Take  by mouth. 30 Tab    • Cyanocobalamin (VITAMIN B-12) 1000 MCG/15ML Liquid Take  by mouth.     • ferrous sulfate 325 (65 FE) MG tablet Take 1 Tab by mouth every morning with breakfast. 30 Tab 3   • Prenatal Multivit-Min-Fe-FA (PRENATAL VITAMINS PO) Take  by mouth.       No facility-administered medications prior to visit.                HPI    Review of Systems   Constitutional: Negative.    HENT: Negative.    Eyes: Negative.    Respiratory: Negative.    Cardiovascular: Negative.    Gastrointestinal: Negative.    Genitourinary: Negative.    Musculoskeletal: Negative.    Skin: Negative.    Neurological: Negative.    Endo/Heme/Allergies: Negative.    Psychiatric/Behavioral: Negative.           Objective:     /76 (BP Location: Left arm, Patient Position: Sitting)   Pulse 73   Temp 36.7 °C (98.1 °F) (Temporal)   Ht  "1.575 m (5' 2.01\")   Wt 68.9 kg (152 lb)   SpO2 95%   BMI 27.79 kg/m²      Physical Exam   Constitutional: She is oriented to person, place, and time. She appears well-developed and well-nourished. No distress.   HENT:   Head: Normocephalic and atraumatic.   Right Ear: External ear normal.   Left Ear: External ear normal.   Nose: Nose normal.   Mouth/Throat: Oropharynx is clear and moist. No oropharyngeal exudate.   Eyes: Pupils are equal, round, and reactive to light. Conjunctivae and EOM are normal. Right eye exhibits no discharge. Left eye exhibits no discharge. No scleral icterus.   Neck: Normal range of motion. Neck supple. No JVD present. No tracheal deviation present. No thyromegaly present.   Cardiovascular: Normal rate, regular rhythm, normal heart sounds and intact distal pulses.  Exam reveals no gallop and no friction rub.    No murmur heard.  Pulmonary/Chest: Effort normal and breath sounds normal. No stridor. No respiratory distress. She has no wheezes. She has no rales. She exhibits no tenderness.   Abdominal: Soft. Bowel sounds are normal. She exhibits no distension and no mass. There is no tenderness. There is no rebound and no guarding.   Musculoskeletal: Normal range of motion. She exhibits no edema or tenderness.   Lymphadenopathy:     She has no cervical adenopathy.   Neurological: She is alert and oriented to person, place, and time. She has normal reflexes. She displays normal reflexes. No cranial nerve deficit. She exhibits normal muscle tone. Coordination normal.   Skin: Skin is warm and dry. No rash noted. She is not diaphoretic. No erythema. No pallor.   Psychiatric: She has a normal mood and affect. Her behavior is normal. Judgment and thought content normal.   Vitals reviewed.         No visits with results within 1 Month(s) from this visit.   Latest known visit with results is:   Hospital Outpatient Visit on 04/27/2018   Component Date Value   • Immunoglobulin A 04/27/2018 312    • " TSH 04/27/2018 1.920    • Sodium 04/27/2018 136    • Potassium 04/27/2018 3.9    • Chloride 04/27/2018 103    • Co2 04/27/2018 25    • Anion Gap 04/27/2018 8.0    • Glucose 04/27/2018 86    • Bun 04/27/2018 11    • Creatinine 04/27/2018 0.74    • Calcium 04/27/2018 9.2    • AST(SGOT) 04/27/2018 17    • ALT(SGPT) 04/27/2018 18    • Alkaline Phosphatase 04/27/2018 63    • Total Bilirubin 04/27/2018 0.8    • Albumin 04/27/2018 4.2    • Total Protein 04/27/2018 7.4    • Globulin 04/27/2018 3.2    • A-G Ratio 04/27/2018 1.3    • Cholesterol,Tot 04/27/2018 228*   • Triglycerides 04/27/2018 75    • HDL 04/27/2018 61    • LDL 04/27/2018 152*   • WBC 04/27/2018 6.8    • RBC 04/27/2018 4.89    • Hemoglobin 04/27/2018 15.6    • Hematocrit 04/27/2018 44.1    • MCV 04/27/2018 90.2    • MCH 04/27/2018 31.9    • MCHC 04/27/2018 35.4*   • RDW 04/27/2018 39.3    • Platelet Count 04/27/2018 195    • MPV 04/27/2018 10.9    • Neutrophils-Polys 04/27/2018 51.20    • Lymphocytes 04/27/2018 39.70    • Monocytes 04/27/2018 6.10    • Eosinophils 04/27/2018 2.10    • Basophils 04/27/2018 0.60    • Immature Granulocytes 04/27/2018 0.30    • Nucleated RBC 04/27/2018 0.00    • Neutrophils (Absolute) 04/27/2018 3.47    • Lymphs (Absolute) 04/27/2018 2.69    • Monos (Absolute) 04/27/2018 0.41    • Eos (Absolute) 04/27/2018 0.14    • Baso (Absolute) 04/27/2018 0.04    • Immature Granulocytes (a* 04/27/2018 0.02    • NRBC (Absolute) 04/27/2018 0.00    • [place], IgE 04/27/2018 0.28    • Asparagus  F261 04/27/2018 0.36*   • F138 Avocado 04/27/2018 0.34    • [place] Yeast IgE 04/27/2018 <0.10    • F204 Banana 04/27/2018 0.31    • Basil  F269 04/27/2018 <0.10    • [place]   F278 04/27/2018 <0.10    • F27 Beef 04/27/2018 <0.10    • Beet Root, IgE 04/27/2018 0.30    • Pepper C. annuum, IgE 04/27/2018 0.32    • Black Pepper  F280 04/27/2018 0.21    • Mussel F037 04/27/2018 <0.10    • Blueberry  F288 04/27/2018 <0.10    • Brazil Nut  F018 04/27/2018  0.18    • Broccoli  F260 04/27/2018 0.28    • Buckwheat  F011 04/27/2018 0.35*   • Cabbage F216 04/27/2018 0.33    • F31 Carrot 04/27/2018 0.28    • Cashew Nut  F202 04/27/2018 0.13    • F299 Sweet [place] 04/27/2018 0.35*   • Chick Pea  F309 04/27/2018 0.33    • Chocolate Cacao, IgE F093 04/27/2018 <0.10    • Cinnamon  F220 04/27/2018 <0.10    • Clam  F207 04/27/2018 0.14    • Coconut 04/27/2018 0.24    • Codfish Allergen 04/27/2018 <0.10    • Occupational Cotton Seed* 04/27/2018 <0.10    • Crab  F023 04/27/2018 0.15    • Cucumber  F244 04/27/2018 0.39*   • Dill  F277 04/27/2018 0.31    • F1 Eggwhite 04/27/2018 <0.10    • Kezia  F270 04/27/2018 0.34    • Grape Allergen 04/27/2018 0.16    • Halibut  F303 04/27/2018 <0.10    • Hazelnut/Fil[place]  F017 04/27/2018 0.32    • Honeydew/Cantaloupe, IgE 04/27/2018 0.35*   • Lettuce F215 04/27/2018 0.30    • Aleman Brean/White Diaz, I* 04/27/2018 0.39*   • F2 Milk 04/27/2018 <0.10    • Onions  F048 04/27/2018 0.31    • [place] F033 04/27/2018 0.29    • Oregano  F283 04/27/2018 0.12    • F12 Pea 04/27/2018 0.26    • F013 Peanut 04/27/2018 0.32    • Pear  F094 04/27/2018 0.29    • [place]  F255 04/27/2018 0.26    • Pork  F026 04/27/2018 0.13    • Potato 04/27/2018 0.30    • Raspberry, IgE F343 04/27/2018 0.30    • Sesame Seed 04/27/2018 0.39*   • Shrimp IgE 04/27/2018 0.10    • F14 Diaz Soybean 04/27/2018 0.28    • Tea  F222 04/27/2018 <0.10    • Tomato IgE 04/27/2018 0.37*   • Trout  F204 04/27/2018 <0.10    • Turkey F284 04/27/2018 <0.10    • [place]  F255 04/27/2018 0.25    • Watermelon 04/27/2018 0.36*   • Immunocap Score 04/27/2018 See Note    • Height 04/27/2018 62    • Weight 04/27/2018 167    • H Pylori Breath Test 04/27/2018 Negative    • GFR If  04/27/2018 >60    • GFR If Non  Ameri* 04/27/2018 >60    • t-TG IgA 04/27/2018 1       No results found for: HBA1C  Lab Results   Component Value Date/Time    SODIUM 136 04/27/2018 10:57 AM    POTASSIUM 3.9  04/27/2018 10:57 AM    CHLORIDE 103 04/27/2018 10:57 AM    CO2 25 04/27/2018 10:57 AM    GLUCOSE 86 04/27/2018 10:57 AM    BUN 11 04/27/2018 10:57 AM    CREATININE 0.74 04/27/2018 10:57 AM    ALKPHOSPHAT 63 04/27/2018 10:57 AM    ASTSGOT 17 04/27/2018 10:57 AM    ALTSGPT 18 04/27/2018 10:57 AM    TBILIRUBIN 0.8 04/27/2018 10:57 AM     No results found for: INR  Lab Results   Component Value Date/Time    CHOLSTRLTOT 228 (H) 04/27/2018 10:57 AM     (H) 04/27/2018 10:57 AM    HDL 61 04/27/2018 10:57 AM    TRIGLYCERIDE 75 04/27/2018 10:57 AM       No results found for: TESTOSTERONE  No results found for: TSH  Lab Results   Component Value Date/Time    FREET4 0.98 09/09/2013 11:26 AM     No results found for: URICACID  No components found for: VITB12  Lab Results   Component Value Date/Time    25HYDROXY 22 (L) 11/29/2016 12:13 PM           Assessment/Plan:     1. Attention deficit hyperactivity disorder (ADHD), predominantly inattentive type   Under good control. Continue same regimen.    - amphetamine-dextroamphetamine (ADDERALL) 10 MG Tab; Take 1 Tab by mouth every day for 30 days.  Dispense: 30 Tab; Refill: 0  - amphetamine-dextroamphetamine (ADDERALL) 10 MG Tab; Take 1 Tab by mouth every day for 31 days.  Dispense: 30 Tab; Refill: 1  - amphetamine-dextroamphetamine (ADDERALL) 10 MG Tab; Take 1 Tab by mouth every day for 30 days.  Dispense: 30 Tab; Refill: 0  - amphetamine-dextroamphetamine (ADDERALL XR, 20MG,) 20 MG per XR capsule; Take 1 Cap by mouth every morning for 30 days.  Dispense: 30 Cap; Refill: 0  - amphetamine-dextroamphetamine (ADDERALL XR, 20MG,) 20 MG per XR capsule; Take 1 Cap by mouth every morning for 30 days.  Dispense: 30 Cap; Refill: 0  - amphetamine-dextroamphetamine (ADDERALL XR, 20MG,) 20 MG per XR capsule; Take 1 Cap by mouth every morning for 30 days.  Dispense: 30 Cap; Refill: 0    2. Mixed hyperlipidemia   Under good control. Continue same regimen.       3. Migraine with aura and  without status migrainosus, not intractable       Under good control. Continue same regimen.      30 minute face-to-face encounter took place today.  More than half of this time was spent in the coordination of care of the above problems, as well as counseling.

## 2018-12-21 ENCOUNTER — OFFICE VISIT (OUTPATIENT)
Dept: MEDICAL GROUP | Age: 38
End: 2018-12-21
Payer: COMMERCIAL

## 2018-12-21 VITALS
HEART RATE: 100 BPM | TEMPERATURE: 98.4 F | BODY MASS INDEX: 27.79 KG/M2 | OXYGEN SATURATION: 98 % | DIASTOLIC BLOOD PRESSURE: 70 MMHG | SYSTOLIC BLOOD PRESSURE: 122 MMHG | HEIGHT: 62 IN | WEIGHT: 151 LBS

## 2018-12-21 DIAGNOSIS — G44.209 TENSION HEADACHE: ICD-10-CM

## 2018-12-21 DIAGNOSIS — F90.0 ATTENTION DEFICIT HYPERACTIVITY DISORDER (ADHD), PREDOMINANTLY INATTENTIVE TYPE: ICD-10-CM

## 2018-12-21 DIAGNOSIS — F41.9 ANXIETY: ICD-10-CM

## 2018-12-21 DIAGNOSIS — M62.838 MUSCLE SPASM: ICD-10-CM

## 2018-12-21 PROCEDURE — 99215 OFFICE O/P EST HI 40 MIN: CPT | Performed by: INTERNAL MEDICINE

## 2018-12-21 RX ORDER — DEXTROAMPHETAMINE SACCHARATE, AMPHETAMINE ASPARTATE MONOHYDRATE, DEXTROAMPHETAMINE SULFATE AND AMPHETAMINE SULFATE 5; 5; 5; 5 MG/1; MG/1; MG/1; MG/1
20 CAPSULE, EXTENDED RELEASE ORAL EVERY MORNING
Qty: 90 CAP | Refills: 0 | Status: SHIPPED | OUTPATIENT
Start: 2018-12-27 | End: 2019-08-23 | Stop reason: SDUPTHER

## 2018-12-21 RX ORDER — BUTALBITAL, ACETAMINOPHEN AND CAFFEINE 50; 325; 40 MG/1; MG/1; MG/1
1 TABLET ORAL EVERY 4 HOURS PRN
Qty: 90 TAB | Refills: 1 | Status: SHIPPED | OUTPATIENT
Start: 2018-12-21 | End: 2019-08-23 | Stop reason: SDUPTHER

## 2018-12-21 RX ORDER — DEXTROAMPHETAMINE SACCHARATE, AMPHETAMINE ASPARTATE, DEXTROAMPHETAMINE SULFATE AND AMPHETAMINE SULFATE 2.5; 2.5; 2.5; 2.5 MG/1; MG/1; MG/1; MG/1
10 TABLET ORAL DAILY
Qty: 90 TAB | Refills: 0 | Status: SHIPPED | OUTPATIENT
Start: 2018-12-27 | End: 2019-08-23 | Stop reason: SDUPTHER

## 2018-12-21 RX ORDER — DIAZEPAM 2 MG/1
2 TABLET ORAL
Qty: 90 TAB | Refills: 1 | Status: SHIPPED | OUTPATIENT
Start: 2018-12-21 | End: 2020-06-26 | Stop reason: SDUPTHER

## 2018-12-21 RX ORDER — CYCLOBENZAPRINE HCL 10 MG
10 TABLET ORAL 3 TIMES DAILY PRN
Qty: 90 TAB | Refills: 1 | Status: SHIPPED | OUTPATIENT
Start: 2018-12-21 | End: 2020-02-24 | Stop reason: SDUPTHER

## 2018-12-21 ASSESSMENT — ENCOUNTER SYMPTOMS
RESPIRATORY NEGATIVE: 1
CONSTITUTIONAL NEGATIVE: 1
CARDIOVASCULAR NEGATIVE: 1
FEVER: 0
NEUROLOGICAL NEGATIVE: 1

## 2018-12-21 NOTE — PROGRESS NOTES
Subjective:      Sara Taylor is a 38 y.o. female who presents with Medication Refill (adderral,diazepam)  The patient has a history of chronic medical problems listed below. The patient is compliant with their medications and is having no side effects from them.      1. Attention deficit hyperactivity disorder (ADHD), predominantly inattentive type  Chronic, currently taking 20 mg Adderall XR, 10 mg Adderall IR daily. Denies any side effects. Patient is requesting refill of her medications for her ADHD.     2. Tension headache  Chronic, patient takes Fioricet 325-40-50 mg as needed for history of migraines. Denies any side effects.     3. Anxiety  Chronic, patient takes Valium 2 mg as needed. Denies side effects.     4. Muscle spasm  Chronic, currently taking Flexeril 10 mg as needed. Denies any side effects.       HPI      Patient Active Problem List   Diagnosis   • Mixed hyperlipidemia   • Migraine with aura and without status migrainosus, not intractable   • Vitamin D deficiency disease   • Anxiety   • Tension headache   • Attention deficit hyperactivity disorder (ADHD), predominantly inattentive type   • Multiple allergies     Outpatient Medications Prior to Visit   Medication Sig Dispense Refill   • Cholecalciferol (VITAMIN D3) 5000 units Tab Take  by mouth. 30 Tab    • Cyanocobalamin (VITAMIN B-12) 1000 MCG/15ML Liquid Take  by mouth.     • ferrous sulfate 325 (65 FE) MG tablet Take 1 Tab by mouth every morning with breakfast. 30 Tab 3   • Prenatal Multivit-Min-Fe-FA (PRENATAL VITAMINS PO) Take  by mouth.     • diazePAM (VALIUM) 2 MG Tab Take 1 Tab by mouth 1 time daily as needed for Anxiety for up to 30 days. 30 Tab 0   • amphetamine-dextroamphetamine (ADDERALL) 10 MG Tab Take 1 Tab by mouth every day for 30 days. 30 Tab 0   • amphetamine-dextroamphetamine (ADDERALL XR, 20MG,) 20 MG per XR capsule Take 1 Cap by mouth every morning for 30 days. 30 Cap 0   • acetaminophen/caffeine/butalbital 325-40-50 mg  "(FIORICET) -40 MG Tab Take 1 Tab by mouth every four hours as needed for Headache. 30 Tab 5   • cyclobenzaprine (FLEXERIL) 10 MG Tab Take 1 Tab by mouth 3 times a day as needed. 30 Tab 5     No facility-administered medications prior to visit.         No Known Allergies    Review of Systems   Constitutional: Negative.  Negative for fever.   HENT: Negative.    Respiratory: Negative.    Cardiovascular: Negative.    Neurological: Negative.    All other systems reviewed and are negative.       Objective:     /70 (BP Location: Left arm, Patient Position: Sitting)   Pulse 100   Temp 36.9 °C (98.4 °F) (Temporal)   Ht 1.575 m (5' 2.01\")   Wt 68.5 kg (151 lb)   SpO2 98%   BMI 27.61 kg/m²      Physical Exam   Constitutional: Oriented to person, place, and time. Appears well-developed and well-nourished. No distress.   Head: Normocephalic and atraumatic.   Right Ear: External ear normal.   Left Ear: External ear normal.   Nose: Nose normal.   Mouth/Throat: Oropharynx is clear and moist. No oropharyngeal exudate.   Eyes: Pupils are equal, round, and reactive to light. Conjunctivae and EOM are normal. Right eye exhibits no discharge. Left eye exhibits no discharge. No scleral icterus.   Neck: Normal range of motion. Neck supple. No JVD present. No tracheal deviation present. No thyromegaly present.   Cardiovascular: Normal rate, regular rhythm, normal heart sounds and intact distal pulses.  Exam reveals no gallop and no friction rub.    No murmur heard.  Pulmonary/Chest: Effort normal. No stridor. No respiratory distress. No wheezing or rales. No tenderness.   Abdominal: Soft. Bowel sounds are normal. No distension and no mass. There is no tenderness. There is no rebound and no guarding. No hernia.   Musculoskeletal: Normal range of motion No edema or tenderness.   Lymphadenopathy: No cervical adenopathy.   Neurological: Alert and oriented to person, place, and time. Normal reflexes. Normal reflexes. No cranial " nerve deficit. Normal muscle tone. Coordination normal.   Skin: Skin is warm and dry. No rash noted. Not diaphoretic. No erythema. No pallor.   Psychiatric: Normal mood and affect. Behavior is normal. Judgment and thought content normal.   Nursing note and vitals reviewed.      No results found for: HBA1C  Lab Results   Component Value Date/Time    SODIUM 136 04/27/2018 10:57 AM    POTASSIUM 3.9 04/27/2018 10:57 AM    CHLORIDE 103 04/27/2018 10:57 AM    CO2 25 04/27/2018 10:57 AM    GLUCOSE 86 04/27/2018 10:57 AM    BUN 11 04/27/2018 10:57 AM    CREATININE 0.74 04/27/2018 10:57 AM    ALKPHOSPHAT 63 04/27/2018 10:57 AM    ASTSGOT 17 04/27/2018 10:57 AM    ALTSGPT 18 04/27/2018 10:57 AM    TBILIRUBIN 0.8 04/27/2018 10:57 AM     No results found for: INR  Lab Results   Component Value Date/Time    CHOLSTRLTOT 228 (H) 04/27/2018 10:57 AM     (H) 04/27/2018 10:57 AM    HDL 61 04/27/2018 10:57 AM    TRIGLYCERIDE 75 04/27/2018 10:57 AM       No results found for: TESTOSTERONE  No results found for: TSH  Lab Results   Component Value Date/Time    FREET4 0.98 09/09/2013 11:26 AM     No results found for: URICACID  No components found for: VITB12  Lab Results   Component Value Date/Time    25HYDROXY 22 (L) 11/29/2016 12:13 PM         Assessment/Plan:     1. Attention deficit hyperactivity disorder (ADHD), predominantly inattentive type  Chronic, under good control with current regimen of Adderall 20 mg XR, Adderall 10 mg IR QD. Continue with same regimen.   - amphetamine-dextroamphetamine (ADDERALL) 10 MG Tab; Take 1 Tab by mouth every day for 90 days.  Dispense: 90 Tab; Refill: 0  - amphetamine-dextroamphetamine (ADDERALL XR, 20MG,) 20 MG per XR capsule; Take 1 Cap by mouth every morning for 90 days.  Dispense: 90 Cap; Refill: 0    2. Tension headache  Chronic, under good control with Fioricet as needed. Continue with same regimen.   - acetaminophen/caffeine/butalbital 325-40-50 mg (FIORICET) -40 MG Tab; Take 1  Tab by mouth every four hours as needed for Headache for up to 90 days.  Dispense: 90 Tab; Refill: 1    3. Anxiety  Chronic, under good control with Valium 2 mg as needed. Continue with same regimen.   - diazePAM (VALIUM) 2 MG Tab; Take 1 Tab by mouth 1 time daily as needed for Anxiety for up to 30 days.  Dispense: 90 Tab; Refill: 1    4. Muscle spasm  Chronic, under good control with Flexeril 10 mg as needed. Continue with same regimen.   - cyclobenzaprine (FLEXERIL) 10 MG Tab; Take 1 Tab by mouth 3 times a day as needed.  Dispense: 90 Tab; Refill: 1       Gael WHEELER (Scribe), am scribing for, and in the presence of, Leighton Esqueda M.D..    Electronically signed by: Geal Estrella (Scribe), 12/21/2018    ILeighton M.D., personally performed the services described in this documentation, as scribed by Gael Estrella in my presence, and it is both accurate and complete.        40 minute face-to-face encounter took place today.  More than half of this time was spent in the coordination of care of the above problems, as well as counseling.      We will change patient's monitoring to regular office visits with me every 6 months and have her come in at 3 months for MA visits for BP checks.  Will refill her controlled substances at those times if vital signs are stable, without office visit..

## 2019-01-23 ENCOUNTER — OFFICE VISIT (OUTPATIENT)
Dept: URGENT CARE | Facility: CLINIC | Age: 39
End: 2019-01-23
Payer: COMMERCIAL

## 2019-01-23 VITALS
BODY MASS INDEX: 28.52 KG/M2 | HEART RATE: 88 BPM | DIASTOLIC BLOOD PRESSURE: 72 MMHG | WEIGHT: 155 LBS | OXYGEN SATURATION: 95 % | SYSTOLIC BLOOD PRESSURE: 110 MMHG | HEIGHT: 62 IN | RESPIRATION RATE: 18 BRPM | TEMPERATURE: 98.5 F

## 2019-01-23 DIAGNOSIS — J11.1 INFLUENZA-LIKE ILLNESS: ICD-10-CM

## 2019-01-23 LAB
FLUAV+FLUBV AG SPEC QL IA: NEGATIVE
INT CON NEG: NORMAL
INT CON POS: NORMAL

## 2019-01-23 PROCEDURE — 99204 OFFICE O/P NEW MOD 45 MIN: CPT | Performed by: PHYSICIAN ASSISTANT

## 2019-01-23 PROCEDURE — 87804 INFLUENZA ASSAY W/OPTIC: CPT | Performed by: PHYSICIAN ASSISTANT

## 2019-01-23 RX ORDER — CODEINE PHOSPHATE AND GUAIFENESIN 10; 100 MG/5ML; MG/5ML
SOLUTION ORAL
Qty: 100 ML | Refills: 0 | Status: SHIPPED | OUTPATIENT
Start: 2019-01-23 | End: 2019-01-28

## 2019-01-23 RX ORDER — ALBUTEROL SULFATE 90 UG/1
1-2 AEROSOL, METERED RESPIRATORY (INHALATION) EVERY 6 HOURS PRN
Qty: 8.5 G | Refills: 0 | Status: SHIPPED | OUTPATIENT
Start: 2019-01-23 | End: 2021-06-24

## 2019-01-23 ASSESSMENT — ENCOUNTER SYMPTOMS
WHEEZING: 0
NAUSEA: 0
RHINORRHEA: 1
FEVER: 1
HEMOPTYSIS: 0
HEADACHES: 1
MYALGIAS: 1
SWEATS: 0
CHILLS: 1
SHORTNESS OF BREATH: 0
ABDOMINAL PAIN: 0
COUGH: 1
VOMITING: 0
HEARTBURN: 0
WEIGHT LOSS: 0
SORE THROAT: 1

## 2019-01-23 ASSESSMENT — COPD QUESTIONNAIRES: COPD: 0

## 2019-01-23 NOTE — PROGRESS NOTES
"Subjective:      Sara Taylor is a 38 y.o. female who presents with Fever (Couple days fever and bodyache)                Past medical history: Is not pertinent to this examination  Past surgical history: Not pertinent to this examination  Family history: Is not pertinent to this examination  Allergies: No known drug allergies  Social history: Is reviewed in Epic        Cough   This is a new problem. Episode onset: 2 day. The problem has been waxing and waning. The problem occurs every few minutes. Associated symptoms include chills, a fever, headaches, myalgias, nasal congestion, postnasal drip, rhinorrhea and a sore throat. Pertinent negatives include no chest pain, ear congestion, ear pain, heartburn, hemoptysis, rash, shortness of breath, sweats, weight loss or wheezing. Associated symptoms comments: Felt feverish  . Nothing aggravates the symptoms. She has tried nothing for the symptoms. The treatment provided no relief. There is no history of asthma or COPD.       Review of Systems   Constitutional: Positive for chills and fever. Negative for weight loss.   HENT: Positive for postnasal drip, rhinorrhea and sore throat. Negative for ear pain.    Respiratory: Positive for cough. Negative for hemoptysis, shortness of breath and wheezing.    Cardiovascular: Negative for chest pain.   Gastrointestinal: Negative for abdominal pain, heartburn, nausea and vomiting.   Genitourinary: Negative.    Musculoskeletal: Positive for myalgias.   Skin: Negative for rash.   Neurological: Positive for headaches.   All other systems reviewed and are negative.         Objective:     /72   Pulse 88   Temp 36.9 °C (98.5 °F)   Resp 18   Ht 1.575 m (5' 2\")   Wt 70.3 kg (155 lb)   SpO2 95%   BMI 28.35 kg/m²      Physical Exam       Gen.: Patient is A and O ×3, no acute distress, well-nourished well-hydrated  Vitals: Are listed and unremarkable  HEENT: Heads normocephalic, atraumatic, PERRLA, extraocular movements " intact, TMs and oropharynx clear  Neck: Soft supple without cervical lymphadenopathy  Cardiovascular: Regular rate and rhythm normal S1 and S2. No murmurs, rubs or gallops  Lungs are clear to auscultation bilaterally. no wheezes rales or rhonchi  Abdomen is soft, nontender, nondistended with good bowel sounds, no hepatosplenomegaly  Skin: Is well perfused without evidence of rash or lesions  Neurological:  cranial nerves II through XII were assessed and intact.  Musculoskeletal: Full range of motion, 5 out of 5 strength against resistance  Neurovascularly: Intact with a 2 second cap refill, good distal pulses      Urgent CARE course: Rapid influenza was negative     Assessment/Plan:       1. Influenza-like illness  - POCT Influenza A/B  - guaifenesin-codeine (CHERATUSSIN AC) Solution oral solution; Take one teaspoon every six hours as needed for cough for up to five days  Dispense: 100 mL; Refill: 0  - albuterol 108 (90 Base) MCG/ACT Aero Soln inhalation aerosol; Inhale 1-2 Puffs by mouth every 6 hours as needed for Shortness of Breath.  Dispense: 8.5 g; Refill: 0

## 2019-01-23 NOTE — LETTER
January 23, 2019         Patient: Sara Taylor   YOB: 1980   Date of Visit: 1/23/2019           To Whom it May Concern:    Sara Taylor was seen in my clinic on 1/23/2019.  Please excuse the 23rd, the 24th and if needed 25 January 2019    If you have any questions or concerns, please don't hesitate to call.        Sincerely,           Ashley Coburn P.A.-C.  Electronically Signed

## 2019-03-21 ENCOUNTER — APPOINTMENT (OUTPATIENT)
Dept: MEDICAL GROUP | Age: 39
End: 2019-03-21
Payer: COMMERCIAL

## 2019-06-21 ENCOUNTER — APPOINTMENT (OUTPATIENT)
Dept: MEDICAL GROUP | Age: 39
End: 2019-06-21
Payer: COMMERCIAL

## 2019-08-19 ENCOUNTER — TELEPHONE (OUTPATIENT)
Dept: MEDICAL GROUP | Age: 39
End: 2019-08-19

## 2019-08-19 DIAGNOSIS — G44.209 TENSION HEADACHE: ICD-10-CM

## 2019-08-19 DIAGNOSIS — F90.0 ATTENTION DEFICIT HYPERACTIVITY DISORDER (ADHD), PREDOMINANTLY INATTENTIVE TYPE: ICD-10-CM

## 2019-08-19 RX ORDER — BUTALBITAL, ACETAMINOPHEN AND CAFFEINE 50; 325; 40 MG/1; MG/1; MG/1
1 TABLET ORAL EVERY 4 HOURS PRN
Qty: 90 TAB | Refills: 1 | Status: CANCELLED
Start: 2019-08-19 | End: 2019-11-17

## 2019-08-19 RX ORDER — DEXTROAMPHETAMINE SACCHARATE, AMPHETAMINE ASPARTATE, DEXTROAMPHETAMINE SULFATE AND AMPHETAMINE SULFATE 2.5; 2.5; 2.5; 2.5 MG/1; MG/1; MG/1; MG/1
10 TABLET ORAL DAILY
Qty: 90 TAB | Refills: 0 | Status: CANCELLED
Start: 2019-08-19 | End: 2019-11-17

## 2019-08-19 NOTE — TELEPHONE ENCOUNTER
Please see my last office note.  I indicated at that time at the bottom of my note that we could change her follow-up scheduling to every 6 months and it has been more than 6 months since she was last seen.  So please have her make an appointment to refill this.

## 2019-08-19 NOTE — TELEPHONE ENCOUNTER
Patient called and left voicemail for her Adderall prescription and migraine medication.     She states insurance co-pay is very high for her at the moment.       Please advise.

## 2019-08-19 NOTE — TELEPHONE ENCOUNTER
Phone Number Called: [phone] (home)       Call outcome: left message for patient to call back regarding message below    Message: patient informed to keep upcoming appt.

## 2019-08-23 ENCOUNTER — OFFICE VISIT (OUTPATIENT)
Dept: MEDICAL GROUP | Age: 39
End: 2019-08-23
Payer: COMMERCIAL

## 2019-08-23 DIAGNOSIS — G44.209 TENSION HEADACHE: ICD-10-CM

## 2019-08-23 DIAGNOSIS — E55.9 VITAMIN D DEFICIENCY: ICD-10-CM

## 2019-08-23 DIAGNOSIS — F90.0 ATTENTION DEFICIT HYPERACTIVITY DISORDER (ADHD), PREDOMINANTLY INATTENTIVE TYPE: ICD-10-CM

## 2019-08-23 DIAGNOSIS — Z31.9 ENCOUNTER FOR INFERTILITY: ICD-10-CM

## 2019-08-23 DIAGNOSIS — E78.2 MIXED HYPERLIPIDEMIA: ICD-10-CM

## 2019-08-23 PROCEDURE — 99215 OFFICE O/P EST HI 40 MIN: CPT | Performed by: INTERNAL MEDICINE

## 2019-08-23 RX ORDER — BUTALBITAL, ACETAMINOPHEN AND CAFFEINE 50; 325; 40 MG/1; MG/1; MG/1
1 TABLET ORAL EVERY 4 HOURS PRN
Qty: 90 TAB | Refills: 1 | Status: SHIPPED
Start: 2019-08-23 | End: 2019-11-21

## 2019-08-23 RX ORDER — BUTALBITAL, ACETAMINOPHEN AND CAFFEINE 50; 325; 40 MG/1; MG/1; MG/1
1 TABLET ORAL EVERY 4 HOURS PRN
Qty: 90 TAB | Refills: 1 | Status: SHIPPED | OUTPATIENT
Start: 2019-11-23 | End: 2020-02-21

## 2019-08-23 RX ORDER — DEXTROAMPHETAMINE SACCHARATE, AMPHETAMINE ASPARTATE MONOHYDRATE, DEXTROAMPHETAMINE SULFATE AND AMPHETAMINE SULFATE 5; 5; 5; 5 MG/1; MG/1; MG/1; MG/1
20 CAPSULE, EXTENDED RELEASE ORAL EVERY MORNING
Qty: 90 CAP | Refills: 0 | Status: SHIPPED | OUTPATIENT
Start: 2019-11-23 | End: 2020-02-21

## 2019-08-23 RX ORDER — DEXTROAMPHETAMINE SACCHARATE, AMPHETAMINE ASPARTATE MONOHYDRATE, DEXTROAMPHETAMINE SULFATE AND AMPHETAMINE SULFATE 5; 5; 5; 5 MG/1; MG/1; MG/1; MG/1
20 CAPSULE, EXTENDED RELEASE ORAL EVERY MORNING
Qty: 90 CAP | Refills: 0 | Status: SHIPPED
Start: 2019-08-23 | End: 2020-03-12 | Stop reason: SDUPTHER

## 2019-08-23 RX ORDER — DEXTROAMPHETAMINE SACCHARATE, AMPHETAMINE ASPARTATE, DEXTROAMPHETAMINE SULFATE AND AMPHETAMINE SULFATE 2.5; 2.5; 2.5; 2.5 MG/1; MG/1; MG/1; MG/1
10 TABLET ORAL DAILY
Qty: 90 TAB | Refills: 0 | Status: SHIPPED | OUTPATIENT
Start: 2019-11-23 | End: 2020-02-21

## 2019-08-23 RX ORDER — DEXTROAMPHETAMINE SACCHARATE, AMPHETAMINE ASPARTATE, DEXTROAMPHETAMINE SULFATE AND AMPHETAMINE SULFATE 2.5; 2.5; 2.5; 2.5 MG/1; MG/1; MG/1; MG/1
10 TABLET ORAL DAILY
Qty: 90 TAB | Refills: 0 | Status: SHIPPED
Start: 2019-08-23 | End: 2020-03-12 | Stop reason: SDUPTHER

## 2019-08-23 ASSESSMENT — ENCOUNTER SYMPTOMS
CONSTITUTIONAL NEGATIVE: 1
CARDIOVASCULAR NEGATIVE: 1
PSYCHIATRIC NEGATIVE: 1
EYES NEGATIVE: 1
MUSCULOSKELETAL NEGATIVE: 1
RESPIRATORY NEGATIVE: 1
NEUROLOGICAL NEGATIVE: 1
GASTROINTESTINAL NEGATIVE: 1

## 2019-08-23 NOTE — PROGRESS NOTES
Subjective:      Sara Taylor is a 39 y.o. female who presents with Medication Refill (adderrall 20XR AND 10MG (3month supply), questions about latisse); Orders Needed (requests to test for hormone issue for family planning); and Other (motion sicknees)        HPI  Overall, the patient is doing well. The patient has a history of ADHD and tension headaches. Inattention is improved with Adderall. Tension headaches are under good control with Fioricet. No medication side effects were reported including dizziness, chest pain or palpitations.    She reports having concerns about motion sickness which worsens while driving as a passenger in the vehicle. She attributes the motion sickness to not being able to focus. At this time, she utilizes peppermint oil which works well. She does not like to take Dramamine as it causes drowsiness.     Additionally, she requests a Latisse prescription for eyelash growth secondary to poor growth of her eye lashes. She has used this in the past without a prescription but states it is expensive to purchase it without a prescription. No additional concerns for hair loss or growth.    She is requesting evaluation of her hormone levels stating she is having difficulty with conceiving a second child. She has a history of  and had a miscarriage at six weeks during her second pregnancy. She is on prenatal vitamins. She reported has regular periods.        Patient Active Problem List   Diagnosis   • Mixed hyperlipidemia   • Migraine with aura and without status migrainosus, not intractable   • Vitamin D deficiency disease   • Anxiety   • Tension headache   • Attention deficit hyperactivity disorder (ADHD), predominantly inattentive type   • Multiple allergies       Outpatient Medications Prior to Visit   Medication Sig Dispense Refill   • cyclobenzaprine (FLEXERIL) 10 MG Tab Take 1 Tab by mouth 3 times a day as needed. 90 Tab 1   • Cholecalciferol (VITAMIN D3) 5000 units Tab Take  by  mouth. 30 Tab    • Cyanocobalamin (VITAMIN B-12) 1000 MCG/15ML Liquid Take  by mouth.     • ferrous sulfate 325 (65 FE) MG tablet Take 1 Tab by mouth every morning with breakfast. 30 Tab 3   • Prenatal Multivit-Min-Fe-FA (PRENATAL VITAMINS PO) Take  by mouth.     • albuterol 108 (90 Base) MCG/ACT Aero Soln inhalation aerosol Inhale 1-2 Puffs by mouth every 6 hours as needed for Shortness of Breath. (Patient not taking: Reported on 8/23/2019) 8.5 g 0     No facility-administered medications prior to visit.         No Known Allergies      Review of Systems   Constitutional: Negative.    HENT: Negative.    Eyes: Negative.    Respiratory: Negative.    Cardiovascular: Negative.    Gastrointestinal: Negative.    Genitourinary: Negative.    Musculoskeletal: Negative.    Skin: Negative.    Neurological: Negative.    Endo/Heme/Allergies: Negative.    Psychiatric/Behavioral: Negative.    All other systems reviewed and are negative.    See HPI for further details.       Objective:     There were no vitals taken for this visit.    Nursing note and vitals reviewed.    Physical Exam   Constitutional: Oriented to person, place, and time. Appears well-developed and well-nourished. No distress.   Head: Normocephalic and atraumatic.   Right Ear: External ear normal.   Left Ear: External ear normal.   Nose: Nose normal.   Mouth/Throat: Oropharynx is clear and moist. No oropharyngeal exudate.   Eyes: Pupils are equal, round, and reactive to light. Conjunctivae and EOM are normal. Right eye exhibits no discharge. Left eye exhibits no discharge. No scleral icterus.   Neck: Normal range of motion. Neck supple. No JVD present. No tracheal deviation present. No thyromegaly present. No bruit.  Cardiovascular: Normal rate, regular rhythm, normal heart sounds and intact distal pulses.  Exam reveals no gallop and no friction rub. No murmur heard.  Pulmonary/Chest: Effort normal. No stridor. No respiratory distress. No wheezing or rales. No  tenderness.   Abdominal: Soft. Bowel sounds are normal. No distension and no mass. There is no tenderness. There is no rebound and no guarding. No hernia. No pulsatile masses.  Musculoskeletal: Normal range of motion No edema or tenderness.   Lymphadenopathy: No cervical adenopathy.   Neurological: Alert and oriented to person, place, and time. Normal muscle tone. Coordination normal.   Skin: Skin is warm and dry. No rash noted. Not diaphoretic. No erythema. No pallor.   Psychiatric: Normal mood and affect. Behavior is normal. Judgment and thought content normal.          No visits with results within 1 Month(s) from this visit.   Latest known visit with results is:   Office Visit on 01/23/2019   Component Date Value   • Rapid Influenza A-B 01/23/2019 NEGATIVE    • Internal Control Positive 01/23/2019 Valid    • Internal Control Negative 01/23/2019 Valid       No results found for: HBA1C  Lab Results   Component Value Date/Time    SODIUM 136 04/27/2018 10:57 AM    POTASSIUM 3.9 04/27/2018 10:57 AM    CHLORIDE 103 04/27/2018 10:57 AM    CO2 25 04/27/2018 10:57 AM    GLUCOSE 86 04/27/2018 10:57 AM    BUN 11 04/27/2018 10:57 AM    CREATININE 0.74 04/27/2018 10:57 AM    ALKPHOSPHAT 63 04/27/2018 10:57 AM    ASTSGOT 17 04/27/2018 10:57 AM    ALTSGPT 18 04/27/2018 10:57 AM    TBILIRUBIN 0.8 04/27/2018 10:57 AM     No results found for: INR  Lab Results   Component Value Date/Time    CHOLSTRLTOT 228 (H) 04/27/2018 10:57 AM     (H) 04/27/2018 10:57 AM    HDL 61 04/27/2018 10:57 AM    TRIGLYCERIDE 75 04/27/2018 10:57 AM       No results found for: TESTOSTERONE  No results found for: TSH  Lab Results   Component Value Date/Time    FREET4 0.98 09/09/2013 11:26 AM     No results found for: URICACID  No components found for: VITB12  Lab Results   Component Value Date/Time    25HYDROXY 22 (L) 11/29/2016 12:13 PM          Assessment/Plan:   The following treatment plan was discussed:     1. Attention deficit hyperactivity  disorder (ADHD), predominantly inattentive type  Under good control with Adderall stating her inattention is improved. Refills were provided.   - amphetamine-dextroamphetamine (ADDERALL XR, 20MG,) 20 MG per XR capsule; Take 1 Cap by mouth every morning for 90 days.  Dispense: 90 Cap; Refill: 0  - amphetamine-dextroamphetamine (ADDERALL) 10 MG Tab; Take 1 Tab by mouth every day for 90 days.  Dispense: 90 Tab; Refill: 0  - amphetamine-dextroamphetamine (ADDERALL) 10 MG Tab; Take 1 Tab by mouth every day for 90 days.  Dispense: 90 Tab; Refill: 0  - amphetamine-dextroamphetamine (ADDERALL XR, 20MG,) 20 MG per XR capsule; Take 1 Cap by mouth every morning for 90 days.  Dispense: 90 Cap; Refill: 0    2. Tension headache  Under good control. Continue same regimen of Fioricet.   - acetaminophen/caffeine/butalbital 325-40-50 mg (FIORICET) -40 MG Tab; Take 1 Tab by mouth every four hours as needed for Headache for up to 90 days.  Dispense: 90 Tab; Refill: 1  - acetaminophen/caffeine/butalbital 325-40-50 mg (FIORICET) -40 MG Tab; Take 1 Tab by mouth every four hours as needed for Headache for up to 90 days.  Dispense: 90 Tab; Refill: 1    3. Encounter for infertility  New complaint. Plan to evaluate with laboratory testing. Additionally, she was encouraged to monitor her ovulation. Stress may play a factor in her infertility. She may follow up with a fertility specialist in the future should she chooses.  - Comp Metabolic Panel; Future  - CBC WITH DIFFERENTIAL; Future  - TSH+PRL+FSH+TESTT+LH+T4F+DH     4. Mixed hyperlipidemia  Well-controlled via diet and exercise. Not on a statin at this time.  - Recommended they follow low fat, low carbohydrate and high fiber diet. Additionally, patient was asked to exercise regularly including frequent cardio.  - Recheck lab 1-2 weeks before next follow up visit.   - Comp Metabolic Panel; Future  - Lipid Profile; Future    5. Vitamin D deficiency  Prior history of vitamin D  deficiency. Continue current treatment of Cholecalciferol. Plan to reevaluate labs 1-2 weeks prior to their next follow up appointment.  - VITAMIN D,25 HYDROXY; Future      --Follow up in six months. Return sooner for any acute complaints or concerns.        40 minute face-to-face encounter took place today.  More than half of this time was spent in the coordination of care of the above problems, as well as counseling.   minute face-to-face encounter took place today.  More than half of this time was spent in the coordination of care of the above problems, as well as counseling.      Marcelle WHEELER (Scribe), am scribing for, and in the presence of, Leighton Esqueda M.D.    Electronically signed by: Marcelle Fernandez (Scribe), 8/23/2019    Leighton WHEELER M.D., personally performed the services described in this documentation, as scribed by Marcelle Fernandez in my presence, and it is both accurate and complete.

## 2019-09-06 ENCOUNTER — TELEPHONE (OUTPATIENT)
Dept: MEDICAL GROUP | Age: 39
End: 2019-09-06

## 2019-09-06 NOTE — TELEPHONE ENCOUNTER
MEDICATION PRIOR AUTHORIZATION NEEDED:    1. Name of Medication: amphetamine-dextroamphetamine (ADDERALL XR, 20MG,) 20 MG per XR capsule    2. Requested By (Name of Pharmacy):   [facility]/pharmacy #[id number] - [place], [place] - [address]  [address]  [place] [address]  Phone: [phone] Fax: [phone]       3. Is insurance on file current? Yes [employer] / [employer]  Member ID:[id number]    4. What is the name & phone number of the 3rd party payor? [employer]        Cover My Meds Key:   [id number]    Would you like a PAR started?

## 2020-02-24 DIAGNOSIS — M62.838 MUSCLE SPASM: ICD-10-CM

## 2020-02-24 RX ORDER — CYCLOBENZAPRINE HCL 10 MG
10 TABLET ORAL 3 TIMES DAILY PRN
Qty: 90 TAB | Refills: 1 | Status: SHIPPED | OUTPATIENT
Start: 2020-02-24 | End: 2020-06-26 | Stop reason: SDUPTHER

## 2020-03-12 ENCOUNTER — OFFICE VISIT (OUTPATIENT)
Dept: MEDICAL GROUP | Age: 40
End: 2020-03-12
Payer: COMMERCIAL

## 2020-03-12 VITALS
OXYGEN SATURATION: 99 % | TEMPERATURE: 97.4 F | DIASTOLIC BLOOD PRESSURE: 78 MMHG | HEART RATE: 74 BPM | BODY MASS INDEX: 29.49 KG/M2 | SYSTOLIC BLOOD PRESSURE: 122 MMHG | HEIGHT: 61 IN | WEIGHT: 156.2 LBS

## 2020-03-12 DIAGNOSIS — H02.729 HYPOTRICHOSIS OF EYELID, UNSPECIFIED LATERALITY: ICD-10-CM

## 2020-03-12 DIAGNOSIS — F90.0 ATTENTION DEFICIT HYPERACTIVITY DISORDER (ADHD), PREDOMINANTLY INATTENTIVE TYPE: ICD-10-CM

## 2020-03-12 DIAGNOSIS — L98.9 SKIN DISORDER: ICD-10-CM

## 2020-03-12 PROCEDURE — 99214 OFFICE O/P EST MOD 30 MIN: CPT | Performed by: INTERNAL MEDICINE

## 2020-03-12 RX ORDER — CYANOCOBALAMIN 1000 UG/ML
1000 INJECTION, SOLUTION INTRAMUSCULAR; SUBCUTANEOUS
Qty: 4 ML | Refills: 4 | Status: SHIPPED | OUTPATIENT
Start: 2020-03-12 | End: 2020-03-12 | Stop reason: SDUPTHER

## 2020-03-12 RX ORDER — DEXTROAMPHETAMINE SACCHARATE, AMPHETAMINE ASPARTATE, DEXTROAMPHETAMINE SULFATE AND AMPHETAMINE SULFATE 2.5; 2.5; 2.5; 2.5 MG/1; MG/1; MG/1; MG/1
10 TABLET ORAL DAILY
Qty: 90 TAB | Refills: 0 | Status: SHIPPED | OUTPATIENT
Start: 2020-06-12 | End: 2020-06-26 | Stop reason: SDUPTHER

## 2020-03-12 RX ORDER — DEXTROAMPHETAMINE SACCHARATE, AMPHETAMINE ASPARTATE, DEXTROAMPHETAMINE SULFATE AND AMPHETAMINE SULFATE 2.5; 2.5; 2.5; 2.5 MG/1; MG/1; MG/1; MG/1
10 TABLET ORAL DAILY
Qty: 90 TAB | Refills: 0 | Status: SHIPPED | OUTPATIENT
Start: 2020-03-12 | End: 2020-03-12 | Stop reason: SDUPTHER

## 2020-03-12 RX ORDER — DEXTROAMPHETAMINE SACCHARATE, AMPHETAMINE ASPARTATE MONOHYDRATE, DEXTROAMPHETAMINE SULFATE AND AMPHETAMINE SULFATE 5; 5; 5; 5 MG/1; MG/1; MG/1; MG/1
20 CAPSULE, EXTENDED RELEASE ORAL EVERY MORNING
Qty: 90 CAP | Refills: 0 | Status: SHIPPED | OUTPATIENT
Start: 2020-06-12 | End: 2020-06-26 | Stop reason: SDUPTHER

## 2020-03-12 RX ORDER — CYANOCOBALAMIN 1000 UG/ML
1000 INJECTION, SOLUTION INTRAMUSCULAR; SUBCUTANEOUS
Qty: 4 ML | Refills: 4 | Status: SHIPPED | OUTPATIENT
Start: 2020-03-12 | End: 2020-10-15

## 2020-03-12 RX ORDER — DEXTROAMPHETAMINE SACCHARATE, AMPHETAMINE ASPARTATE MONOHYDRATE, DEXTROAMPHETAMINE SULFATE AND AMPHETAMINE SULFATE 5; 5; 5; 5 MG/1; MG/1; MG/1; MG/1
20 CAPSULE, EXTENDED RELEASE ORAL EVERY MORNING
Qty: 90 CAP | Refills: 0 | Status: SHIPPED | OUTPATIENT
Start: 2020-03-12 | End: 2020-03-12 | Stop reason: SDUPTHER

## 2020-03-12 ASSESSMENT — ENCOUNTER SYMPTOMS
CARDIOVASCULAR NEGATIVE: 1
CONSTITUTIONAL NEGATIVE: 1
MUSCULOSKELETAL NEGATIVE: 1
GASTROINTESTINAL NEGATIVE: 1
RESPIRATORY NEGATIVE: 1
NEUROLOGICAL NEGATIVE: 1
PSYCHIATRIC NEGATIVE: 1
EYES NEGATIVE: 1

## 2020-03-12 ASSESSMENT — FIBROSIS 4 INDEX: FIB4 SCORE: 0.76

## 2020-03-12 ASSESSMENT — PATIENT HEALTH QUESTIONNAIRE - PHQ9: CLINICAL INTERPRETATION OF PHQ2 SCORE: 0

## 2020-03-12 NOTE — PROGRESS NOTES
Subjective:      Sara Taylor is a 39 y.o. female who presents with ADHD (Adderall refill)        HPI    The patient is here for followup of chronic medical problems listed below. The patient is compliant with medications and having no side effects from them. Denies chest pain, abdominal pain, dyspnea, myalgias, or cough.    1. Attention deficit hyperactivity disorder (ADHD), predominantly inattentive type  Known history, patient is taking Adderall XR 20 mg & Adderall 10 mg in combination which she tolerates well. She is here requesting a refill of his medication. Otherwise she denies any new changes or concerns.    2. Skin disorder  Patient is here requesting a referral to follow up with dermatology. She has previously been seen by dermatology, however states that she has not been in the the last few years and wants a referral to be seen.       Patient Active Problem List   Diagnosis   • Mixed hyperlipidemia   • Migraine with aura and without status migrainosus, not intractable   • Vitamin D deficiency disease   • Anxiety   • Tension headache   • Attention deficit hyperactivity disorder (ADHD), predominantly inattentive type   • Multiple allergies       Outpatient Medications Prior to Visit   Medication Sig Dispense Refill   • cyclobenzaprine (FLEXERIL) 10 MG Tab Take 1 Tab by mouth 3 times a day as needed. 90 Tab 1   • NON SPECIFIED Latisse solution: place 1 drop on each eye daily; apply to eyelid line for eyelid growth 1 Each 11   • albuterol 108 (90 Base) MCG/ACT Aero Soln inhalation aerosol Inhale 1-2 Puffs by mouth every 6 hours as needed for Shortness of Breath. 8.5 g 0   • Cholecalciferol (VITAMIN D3) 5000 units Tab Take  by mouth. 30 Tab    • Cyanocobalamin (VITAMIN B-12) 1000 MCG/15ML Liquid Take  by mouth.     • ferrous sulfate 325 (65 FE) MG tablet Take 1 Tab by mouth every morning with breakfast. 30 Tab 3   • Prenatal Multivit-Min-Fe-FA (PRENATAL VITAMINS PO) Take  by mouth.       No  "facility-administered medications prior to visit.         No Known Allergies    Review of Systems   Constitutional: Negative.    HENT: Negative.    Eyes: Negative.    Respiratory: Negative.    Cardiovascular: Negative.    Gastrointestinal: Negative.    Genitourinary: Negative.    Musculoskeletal: Negative.    Skin: Negative.    Neurological: Negative.    Endo/Heme/Allergies: Negative.    Psychiatric/Behavioral: Negative.    All other systems reviewed and are negative.           Objective:     /78 (BP Location: Left arm, Patient Position: Sitting, BP Cuff Size: Adult)   Pulse 74   Temp 36.3 °C (97.4 °F) (Temporal)   Ht 1.549 m (5' 1\")   Wt 70.9 kg (156 lb 3.2 oz)   LMP 03/12/2020 (Exact Date)   SpO2 99%   Breastfeeding No   BMI 29.51 kg/m²     Physical Exam   Constitutional: Oriented to person, place, and time. Appears well-developed and well-nourished. No distress.   Head: Normocephalic and atraumatic.   Right Ear: External ear normal.   Left Ear: External ear normal.   Nose: Nose normal.   Mouth/Throat: Oropharynx is clear and moist. No oropharyngeal exudate.   Eyes: Pupils are equal, round, and reactive to light. Conjunctivae and EOM are normal. Right eye exhibits no discharge. Left eye exhibits no discharge. No scleral icterus.   Neck: Normal range of motion. Neck supple. No JVD present. No tracheal deviation present. No thyromegaly present.   Cardiovascular: Normal rate, regular rhythm, normal heart sounds and intact distal pulses. Exam reveals no gallop and no friction rub. No murmur heard.  Pulmonary/Chest: Effort normal. No stridor. No respiratory distress. No wheezing or rales. No tenderness.   Abdominal: Soft. Bowel sounds are normal. No distension and no mass. There is no tenderness. There is no rebound and no guarding. No hernia.   Musculoskeletal: Normal range of motion No edema or tenderness.   Lymphadenopathy: No cervical adenopathy.   Neurological: Alert and oriented to person, place, " and time. Normal reflexes. Normal reflexes. No cranial nerve deficit. Normal muscle tone. Coordination normal.   Skin: Skin is warm and dry. No rash noted. Not diaphoretic. No erythema. No pallor.   Psychiatric: Normal mood and affect. Behavior is normal. Judgment and thought content normal.   Nursing note and vitals reviewed.      No results found for: HBA1C  Lab Results   Component Value Date/Time    SODIUM 136 04/27/2018 10:57 AM    POTASSIUM 3.9 04/27/2018 10:57 AM    CHLORIDE 103 04/27/2018 10:57 AM    CO2 25 04/27/2018 10:57 AM    GLUCOSE 86 04/27/2018 10:57 AM    BUN 11 04/27/2018 10:57 AM    CREATININE 0.74 04/27/2018 10:57 AM    ALKPHOSPHAT 63 04/27/2018 10:57 AM    ASTSGOT 17 04/27/2018 10:57 AM    ALTSGPT 18 04/27/2018 10:57 AM    TBILIRUBIN 0.8 04/27/2018 10:57 AM     No results found for: INR  Lab Results   Component Value Date/Time    CHOLSTRLTOT 228 (H) 04/27/2018 10:57 AM     (H) 04/27/2018 10:57 AM    HDL 61 04/27/2018 10:57 AM    TRIGLYCERIDE 75 04/27/2018 10:57 AM       No results found for: TESTOSTERONE  No results found for: TSH  Lab Results   Component Value Date/Time    FREET4 0.98 09/09/2013 11:26 AM     No results found for: URICACID  No components found for: VITB12  Lab Results   Component Value Date/Time    25HYDROXY 22 (L) 11/29/2016 12:13 PM          Assessment/Plan:     1. Attention deficit hyperactivity disorder (ADHD), predominantly inattentive type  Patient is given a prescription for her Adderall which she will continue to take as prescribed  - cyanocobalamin (VITAMIN B-12) 1000 MCG/ML Solution; 1 mL by Intramuscular route every 30 days. Include syringes and needles  Dispense: 4 mL; Refill: 4  - amphetamine-dextroamphetamine (ADDERALL XR, 20MG,) 20 MG per XR capsule; Take 1 Cap by mouth every morning for 90 days.  Dispense: 90 Cap; Refill: 0  - amphetamine-dextroamphetamine (ADDERALL) 10 MG Tab; Take 1 Tab by mouth every day for 90 days.  Dispense: 90 Tab; Refill: 0    2.  Skin disorder  Patient is given a referral to follow up with dermatology.  - REFERRAL TO DERMATOLOGY    Other orders  - NON SPECIFIED; Latisse solution: place 1 drop on each eye daily; apply to eyelid line for eyelid growth  Dispense: 1 Each; Refill: 11    Gael WHEELER (Scribe), am scribing for, and in the presence of, Leighton Esqueda M.D..    Electronically signed by: Gael Hawkins (Scribe), 3/12/2020    ILeighton M.D., personally performed the services described in this documentation, as scribed by Gael Hawkins in my presence, and it is both accurate and complete.

## 2020-03-17 ENCOUNTER — TELEPHONE (OUTPATIENT)
Dept: MEDICAL GROUP | Age: 40
End: 2020-03-17

## 2020-03-17 NOTE — TELEPHONE ENCOUNTER
ok.  Will rewrite 90-day prescriptions in June of this year for Adderall without an office visit, and then see her again in September for office visit for future prescriptions.

## 2020-03-17 NOTE — TELEPHONE ENCOUNTER
1. Caller Name: Adrian                        Call Back Number:   [id number] - [place], [place] - [address]  [address]  [place] [address]  Phone: [phone] Fax: [phone]    Pharmacy called and stated that they are unable to accept the future prescriptions they are going to call pt and inform her. You will have to rewrite them again in June but because it is a controlled substance.      WILBERTO

## 2020-05-22 ENCOUNTER — APPOINTMENT (RX ONLY)
Dept: URBAN - METROPOLITAN AREA CLINIC 20 | Facility: CLINIC | Age: 40
Setting detail: DERMATOLOGY
End: 2020-05-22

## 2020-05-22 DIAGNOSIS — L70.0 ACNE VULGARIS: ICD-10-CM

## 2020-05-22 DIAGNOSIS — L91.0 HYPERTROPHIC SCAR: ICD-10-CM

## 2020-05-22 DIAGNOSIS — L81.9 DISORDER OF PIGMENTATION, UNSPECIFIED: ICD-10-CM

## 2020-05-22 DIAGNOSIS — L82.0 INFLAMED SEBORRHEIC KERATOSIS: ICD-10-CM

## 2020-05-22 PROCEDURE — ? COUNSELING

## 2020-05-22 PROCEDURE — 99202 OFFICE O/P NEW SF 15 MIN: CPT | Mod: 25

## 2020-05-22 PROCEDURE — 11900 INJECT SKIN LESIONS </W 7: CPT | Mod: 59

## 2020-05-22 PROCEDURE — ? INTRALESIONAL KENALOG

## 2020-05-22 PROCEDURE — ? MEDICATION COUNSELING

## 2020-05-22 PROCEDURE — ? PRESCRIPTION

## 2020-05-22 PROCEDURE — ? LIQUID NITROGEN

## 2020-05-22 PROCEDURE — 17110 DESTRUCTION B9 LES UP TO 14: CPT

## 2020-05-22 RX ORDER — ADAPALENE 3 MG/G
GEL TOPICAL
Qty: 1 | Refills: 3 | Status: ERX | COMMUNITY
Start: 2020-05-22

## 2020-05-22 RX ORDER — HYDROQUINONE 40 MG/G
CREAM TOPICAL
Qty: 1 | Refills: 3 | Status: ERX | COMMUNITY
Start: 2020-05-22

## 2020-05-22 RX ADMIN — ADAPALENE 1: 3 GEL TOPICAL at 00:00

## 2020-05-22 RX ADMIN — HYDROQUINONE 1: 40 CREAM TOPICAL at 00:00

## 2020-05-22 ASSESSMENT — LOCATION ZONE DERM
LOCATION ZONE: HAND
LOCATION ZONE: FACE
LOCATION ZONE: ARM

## 2020-05-22 ASSESSMENT — LOCATION DETAILED DESCRIPTION DERM
LOCATION DETAILED: 2ND WEB SPACE RIGHT HAND
LOCATION DETAILED: RIGHT DISTAL DORSAL FOREARM
LOCATION DETAILED: RIGHT RADIAL DORSAL HAND
LOCATION DETAILED: LEFT INFERIOR CENTRAL MALAR CHEEK
LOCATION DETAILED: RIGHT LATERAL MALAR CHEEK

## 2020-05-22 ASSESSMENT — LOCATION SIMPLE DESCRIPTION DERM
LOCATION SIMPLE: RIGHT CHEEK
LOCATION SIMPLE: RIGHT FOREARM
LOCATION SIMPLE: RIGHT HAND
LOCATION SIMPLE: LEFT CHEEK

## 2020-05-22 NOTE — PROCEDURE: COUNSELING
Detail Level: Detailed
Sarecycline Counseling: Patient advised regarding possible photosensitivity and discoloration of the teeth, skin, lips, tongue and gums.  Patient instructed to avoid sunlight, if possible.  When exposed to sunlight, patients should wear protective clothing, sunglasses, and sunscreen.  The patient was instructed to call the office immediately if the following severe adverse effects occur:  hearing changes, easy bruising/bleeding, severe headache, or vision changes.  The patient verbalized understanding of the proper use and possible adverse effects of sarecycline.  All of the patient's questions and concerns were addressed.
Doxycycline Pregnancy And Lactation Text: This medication is Pregnancy Category D and not consider safe during pregnancy. It is also excreted in breast milk but is considered safe for shorter treatment courses.
Tetracycline Counseling: Patient counseled regarding possible photosensitivity and increased risk for sunburn.  Patient instructed to avoid sunlight, if possible.  When exposed to sunlight, patients should wear protective clothing, sunglasses, and sunscreen.  The patient was instructed to call the office immediately if the following severe adverse effects occur:  hearing changes, easy bruising/bleeding, severe headache, or vision changes.  The patient verbalized understanding of the proper use and possible adverse effects of tetracycline.  All of the patient's questions and concerns were addressed. Patient understands to avoid pregnancy while on therapy due to potential birth defects.
Azithromycin Counseling:  I discussed with the patient the risks of azithromycin including but not limited to GI upset, allergic reaction, drug rash, diarrhea, and yeast infections.
Isotretinoin Pregnancy And Lactation Text: This medication is Pregnancy Category X and is considered extremely dangerous during pregnancy. It is unknown if it is excreted in breast milk.
Topical Clindamycin Pregnancy And Lactation Text: This medication is Pregnancy Category B and is considered safe during pregnancy. It is unknown if it is excreted in breast milk.
Birth Control Pills Counseling: Birth Control Pill Counseling: I discussed with the patient the potential side effects of OCPs including but not limited to increased risk of stroke, heart attack, thrombophlebitis, deep venous thrombosis, hepatic adenomas, breast changes, GI upset, headaches, and depression.  The patient verbalized understanding of the proper use and possible adverse effects of OCPs. All of the patient's questions and concerns were addressed.
Include Pregnancy/Lactation Warning?: No
Doxycycline Counseling:  Patient counseled regarding possible photosensitivity and increased risk for sunburn.  Patient instructed to avoid sunlight, if possible.  When exposed to sunlight, patients should wear protective clothing, sunglasses, and sunscreen.  The patient was instructed to call the office immediately if the following severe adverse effects occur:  hearing changes, easy bruising/bleeding, severe headache, or vision changes.  The patient verbalized understanding of the proper use and possible adverse effects of doxycycline.  All of the patient's questions and concerns were addressed.
Erythromycin Counseling:  I discussed with the patient the risks of erythromycin including but not limited to GI upset, allergic reaction, drug rash, diarrhea, increase in liver enzymes, and yeast infections.
Tazorac Counseling:  Patient advised that medication is irritating and drying.  Patient may need to apply sparingly and wash off after an hour before eventually leaving it on overnight.  The patient verbalized understanding of the proper use and possible adverse effects of tazorac.  All of the patient's questions and concerns were addressed.
High Dose Vitamin A Counseling: Side effects reviewed, pt to contact office should one occur.
Azithromycin Pregnancy And Lactation Text: This medication is considered safe during pregnancy and is also secreted in breast milk.
Sarecycline Pregnancy And Lactation Text: This medication is Pregnancy Category D and not consider safe during pregnancy. It is also excreted in breast milk.
Birth Control Pills Pregnancy And Lactation Text: This medication should be avoided if pregnant and for the first 30 days post-partum.
Topical Retinoid Pregnancy And Lactation Text: This medication is Pregnancy Category C. It is unknown if this medication is excreted in breast milk.
Detail Level: Zone
Topical Sulfur Applications Counseling: Topical Sulfur Counseling: Patient counseled that this medication may cause skin irritation or allergic reactions.  In the event of skin irritation, the patient was advised to reduce the amount of the drug applied or use it less frequently.   The patient verbalized understanding of the proper use and possible adverse effects of topical sulfur application.  All of the patient's questions and concerns were addressed.
Bactrim Counseling:  I discussed with the patient the risks of sulfa antibiotics including but not limited to GI upset, allergic reaction, drug rash, diarrhea, dizziness, photosensitivity, and yeast infections.  Rarely, more serious reactions can occur including but not limited to aplastic anemia, agranulocytosis, methemoglobinemia, blood dyscrasias, liver or kidney failure, lung infiltrates or desquamative/blistering drug rashes.
Dapsone Pregnancy And Lactation Text: This medication is Pregnancy Category C and is not considered safe during pregnancy or breast feeding.
Erythromycin Pregnancy And Lactation Text: This medication is Pregnancy Category B and is considered safe during pregnancy. It is also excreted in breast milk.
Tazorac Pregnancy And Lactation Text: This medication is not safe during pregnancy. It is unknown if this medication is excreted in breast milk.
High Dose Vitamin A Pregnancy And Lactation Text: High dose vitamin A therapy is contraindicated during pregnancy and breast feeding.
Topical Sulfur Applications Pregnancy And Lactation Text: This medication is Pregnancy Category C and has an unknown safety profile during pregnancy. It is unknown if this topical medication is excreted in breast milk.
Spironolactone Counseling: Patient advised regarding risks of diarrhea, abdominal pain, hyperkalemia, birth defects (for female patients), liver toxicity and renal toxicity. The patient may need blood work to monitor liver and kidney function and potassium levels while on therapy. The patient verbalized understanding of the proper use and possible adverse effects of spironolactone.  All of the patient's questions and concerns were addressed.
Dapsone Counseling: I discussed with the patient the risks of dapsone including but not limited to hemolytic anemia, agranulocytosis, rashes, methemoglobinemia, kidney failure, peripheral neuropathy, headaches, GI upset, and liver toxicity.  Patients who start dapsone require monitoring including baseline LFTs and weekly CBCs for the first month, then every month thereafter.  The patient verbalized understanding of the proper use and possible adverse effects of dapsone.  All of the patient's questions and concerns were addressed.
Benzoyl Peroxide Counseling: Patient counseled that medicine may cause skin irritation and bleach clothing.  In the event of skin irritation, the patient was advised to reduce the amount of the drug applied or use it less frequently.   The patient verbalized understanding of the proper use and possible adverse effects of benzoyl peroxide.  All of the patient's questions and concerns were addressed.
Topical Retinoid counseling:  Patient advised to apply a pea-sized amount only at bedtime and wait 30 minutes after washing their face before applying.  If too drying, patient may add a non-comedogenic moisturizer. The patient verbalized understanding of the proper use and possible adverse effects of retinoids.  All of the patient's questions and concerns were addressed.
Topical Clindamycin Counseling: Patient counseled that this medication may cause skin irritation or allergic reactions.  In the event of skin irritation, the patient was advised to reduce the amount of the drug applied or use it less frequently.   The patient verbalized understanding of the proper use and possible adverse effects of clindamycin.  All of the patient's questions and concerns were addressed.
Bactrim Pregnancy And Lactation Text: This medication is Pregnancy Category D and is known to cause fetal risk.  It is also excreted in breast milk.
Spironolactone Pregnancy And Lactation Text: This medication can cause feminization of the male fetus and should be avoided during pregnancy. The active metabolite is also found in breast milk.
Isotretinoin Counseling: Patient should get monthly blood tests, not donate blood, not drive at night if vision affected, not share medication, and not undergo elective surgery for 6 months after tx completed. Side effects reviewed, pt to contact office should one occur.
Benzoyl Peroxide Pregnancy And Lactation Text: This medication is Pregnancy Category C. It is unknown if benzoyl peroxide is excreted in breast milk.
Minocycline Counseling: Patient advised regarding possible photosensitivity and discoloration of the teeth, skin, lips, tongue and gums.  Patient instructed to avoid sunlight, if possible.  When exposed to sunlight, patients should wear protective clothing, sunglasses, and sunscreen.  The patient was instructed to call the office immediately if the following severe adverse effects occur:  hearing changes, easy bruising/bleeding, severe headache, or vision changes.  The patient verbalized understanding of the proper use and possible adverse effects of minocycline.  All of the patient's questions and concerns were addressed.

## 2020-05-22 NOTE — PROCEDURE: LIQUID NITROGEN
Include Z78.9 (Other Specified Conditions Influencing Health Status) As An Associated Diagnosis?: No
Post-Care Instructions: I reviewed with the patient in detail post-care instructions. Patient is to wear sunprotection, and avoid picking at any of the treated lesions. Pt may apply Vaseline to crusted or scabbing areas.
Medical Necessity Clause: This procedure was medically necessary because the lesions that were treated were:
Duration Of Freeze Thaw-Cycle (Seconds): 5-10
Consent: The patient's consent was obtained including but not limited to risks of crusting, scabbing, blistering, scarring, darker or lighter pigmentary change, recurrence, incomplete removal and infection.
Detail Level: Detailed
Medical Necessity Information: It is in your best interest to select a reason for this procedure from the list below. All of these items fulfill various CMS LCD requirements except the new and changing color options.
Number Of Freeze-Thaw Cycles: 2 freeze-thaw cycles

## 2020-05-22 NOTE — PROCEDURE: MEDICATION COUNSELING
Xeljanz Counseling: I discussed with the patient the risks of Xeljanz therapy including increased risk of infection, liver issues, headache, diarrhea, or cold symptoms. Live vaccines should be avoided. They were instructed to call if they have any problems.
Picato Counseling:  I discussed with the patient the risks of Picato including but not limited to erythema, scaling, itching, weeping, crusting, and pain.
Detail Level: Zone
Tazorac Counseling:  Patient advised that medication is irritating and drying.  Patient may need to apply sparingly and wash off after an hour before eventually leaving it on overnight.  The patient verbalized understanding of the proper use and possible adverse effects of tazorac.  All of the patient's questions and concerns were addressed.
Minocycline Counseling: Patient advised regarding possible photosensitivity and discoloration of the teeth, skin, lips, tongue and gums.  Patient instructed to avoid sunlight, if possible.  When exposed to sunlight, patients should wear protective clothing, sunglasses, and sunscreen.  The patient was instructed to call the office immediately if the following severe adverse effects occur:  hearing changes, easy bruising/bleeding, severe headache, or vision changes.  The patient verbalized understanding of the proper use and possible adverse effects of minocycline.  All of the patient's questions and concerns were addressed.
Itraconazole Counseling:  I discussed with the patient the risks of itraconazole including but not limited to liver damage, nausea/vomiting, neuropathy, and severe allergy.  The patient understands that this medication is best absorbed when taken with acidic beverages such as non-diet cola or ginger ale.  The patient understands that monitoring is required including baseline LFTs and repeat LFTs at intervals.  The patient understands that they are to contact us or the primary physician if concerning signs are noted.
High Dose Vitamin A Counseling: Side effects reviewed, pt to contact office should one occur.
Cosentyx Pregnancy And Lactation Text: This medication is Pregnancy Category B and is considered safe during pregnancy. It is unknown if this medication is excreted in breast milk.
Picato Pregnancy And Lactation Text: This medication is Pregnancy Category C. It is unknown if this medication is excreted in breast milk.
Minocycline Pregnancy And Lactation Text: This medication is Pregnancy Category D and not consider safe during pregnancy. It is also excreted in breast milk.
Xelwilberz Pregnancy And Lactation Text: This medication is Pregnancy Category D and is not considered safe during pregnancy.  The risk during breast feeding is also uncertain.
Dupixent Counseling: I discussed with the patient the risks of dupilumab including but not limited to eye infection and irritation, cold sores, injection site reactions, worsening of asthma, allergic reactions and increased risk of parasitic infection.  Live vaccines should be avoided while taking dupilumab. Dupilumab will also interact with certain medications such as warfarin and cyclosporine. The patient understands that monitoring is required and they must alert us or the primary physician if symptoms of infection or other concerning signs are noted.
SSKI Counseling:  I discussed with the patient the risks of SSKI including but not limited to thyroid abnormalities, metallic taste, GI upset, fever, headache, acne, arthralgias, paraesthesias, lymphadenopathy, easy bleeding, arrhythmias, and allergic reaction.
Siliq Counseling:  I discussed with the patient the risks of Siliq including but not limited to new or worsening depression, suicidal thoughts and behavior, immunosuppression, malignancy, posterior leukoencephalopathy syndrome, and serious infections.  The patient understands that monitoring is required including a PPD at baseline and must alert us or the primary physician if symptoms of infection or other concerning signs are noted. There is also a special program designed to monitor depression which is required with Siliq.
Hydroxyzine Pregnancy And Lactation Text: This medication is not safe during pregnancy and should not be taken. It is also excreted in breast milk and breast feeding isn't recommended.
Cyclosporine Pregnancy And Lactation Text: This medication is Pregnancy Category C and it isn't know if it is safe during pregnancy. This medication is excreted in breast milk.
Birth Control Pills Pregnancy And Lactation Text: This medication should be avoided if pregnant and for the first 30 days post-partum.
Dapsone Pregnancy And Lactation Text: This medication is Pregnancy Category C and is not considered safe during pregnancy or breast feeding.
Niacinamide Pregnancy And Lactation Text: These medications are considered safe during pregnancy.
High Dose Vitamin A Pregnancy And Lactation Text: High dose vitamin A therapy is contraindicated during pregnancy and breast feeding.
Bactrim Pregnancy And Lactation Text: This medication is Pregnancy Category D and is known to cause fetal risk.  It is also excreted in breast milk.
Sski Pregnancy And Lactation Text: This medication is Pregnancy Category D and isn't considered safe during pregnancy. It is excreted in breast milk.
Spironolactone Counseling: Patient advised regarding risks of diarrhea, abdominal pain, hyperkalemia, birth defects (for female patients), liver toxicity and renal toxicity. The patient may need blood work to monitor liver and kidney function and potassium levels while on therapy. The patient verbalized understanding of the proper use and possible adverse effects of spironolactone.  All of the patient's questions and concerns were addressed.
Cephalexin Counseling: I counseled the patient regarding use of cephalexin as an antibiotic for prophylactic and/or therapeutic purposes. Cephalexin (commonly prescribed under brand name Keflex) is a cephalosporin antibiotic which is active against numerous classes of bacteria, including most skin bacteria. Side effects may include nausea, diarrhea, gastrointestinal upset, rash, hives, yeast infections, and in rare cases, hepatitis, kidney disease, seizures, fever, confusion, neurologic symptoms, and others. Patients with severe allergies to penicillin medications are cautioned that there is about a 10% incidence of cross-reactivity with cephalosporins. When possible, patients with penicillin allergies should use alternatives to cephalosporins for antibiotic therapy.
Xolair Counseling:  Patient informed of potential adverse effects including but not limited to fever, muscle aches, rash and allergic reactions.  The patient verbalized understanding of the proper use and possible adverse effects of Xolair.  All of the patient's questions and concerns were addressed.
Dupixent Pregnancy And Lactation Text: This medication likely crosses the placenta but the risk for the fetus is uncertain. This medication is excreted in breast milk.
Itraconazole Pregnancy And Lactation Text: This medication is Pregnancy Category C and it isn't know if it is safe during pregnancy. It is also excreted in breast milk.
Tazorac Pregnancy And Lactation Text: This medication is not safe during pregnancy. It is unknown if this medication is excreted in breast milk.
Eucrisa Counseling: Patient may experience a mild burning sensation during topical application. Eucrisa is not approved in children less than 2 years of age.
Siliq Pregnancy And Lactation Text: The risk during pregnancy and breastfeeding is uncertain with this medication.
Methotrexate Counseling:  Patient counseled regarding adverse effects of methotrexate including but not limited to nausea, vomiting, abnormalities in liver function tests. Patients may develop mouth sores, rash, diarrhea, and abnormalities in blood counts. The patient understands that monitoring is required including LFT's and blood counts.  There is a rare possibility of scarring of the liver and lung problems that can occur when taking methotrexate. Persistent nausea, loss of appetite, pale stools, dark urine, cough, and shortness of breath should be reported immediately. Patient advised to discontinue methotrexate treatment at least three months before attempting to become pregnant.  I discussed the need for folate supplements while taking methotrexate.  These supplements can decrease side effects during methotrexate treatment. The patient verbalized understanding of the proper use and possible adverse effects of methotrexate.  All of the patient's questions and concerns were addressed.
Nsaids Counseling: NSAID Counseling: I discussed with the patient that NSAIDs should be taken with food. Prolonged use of NSAIDs can result in the development of stomach ulcers.  Patient advised to stop taking NSAIDs if abdominal pain occurs.  The patient verbalized understanding of the proper use and possible adverse effects of NSAIDs.  All of the patient's questions and concerns were addressed.
Erivedge Counseling- I discussed with the patient the risks of Erivedge including but not limited to nausea, vomiting, diarrhea, constipation, weight loss, changes in the sense of taste, decreased appetite, muscle spasms, and hair loss.  The patient verbalized understanding of the proper use and possible adverse effects of Erivedge.  All of the patient's questions and concerns were addressed.
Simponi Counseling:  I discussed with the patient the risks of golimumab including but not limited to myelosuppression, immunosuppression, autoimmune hepatitis, demyelinating diseases, lymphoma, and serious infections.  The patient understands that monitoring is required including a PPD at baseline and must alert us or the primary physician if symptoms of infection or other concerning signs are noted.
Rifampin Pregnancy And Lactation Text: This medication is Pregnancy Category C and it isn't know if it is safe during pregnancy. It is also excreted in breast milk and should not be used if you are breast feeding.
Albendazole Counseling:  I discussed with the patient the risks of albendazole including but not limited to cytopenia, kidney damage, nausea/vomiting and severe allergy.  The patient understands that this medication is being used in an off-label manner.
Methotrexate Pregnancy And Lactation Text: This medication is Pregnancy Category X and is known to cause fetal harm. This medication is excreted in breast milk.
Nsaids Pregnancy And Lactation Text: These medications are considered safe up to 30 weeks gestation. It is excreted in breast milk.
Ketoconazole Counseling:   Patient counseled regarding improving absorption with orange juice.  Adverse effects include but are not limited to breast enlargement, headache, diarrhea, nausea, upset stomach, liver function test abnormalities, taste disturbance, and stomach pain.  There is a rare possibility of liver failure that can occur when taking ketoconazole. The patient understands that monitoring of LFTs may be required, especially at baseline. The patient verbalized understanding of the proper use and possible adverse effects of ketoconazole.  All of the patient's questions and concerns were addressed.
Erivedge Pregnancy And Lactation Text: This medication is Pregnancy Category X and is absolutely contraindicated during pregnancy. It is unknown if it is excreted in breast milk.
Eucrisa Pregnancy And Lactation Text: This medication has not been assigned a Pregnancy Risk Category but animal studies failed to show danger with the topical medication. It is unknown if the medication is excreted in breast milk.
Thalidomide Counseling: I discussed with the patient the risks of thalidomide including but not limited to birth defects, anxiety, weakness, chest pain, dizziness, cough and severe allergy.
Spironolactone Pregnancy And Lactation Text: This medication can cause feminization of the male fetus and should be avoided during pregnancy. The active metabolite is also found in breast milk.
Quinolones Counseling:  I discussed with the patient the risks of fluoroquinolones including but not limited to GI upset, allergic reaction, drug rash, diarrhea, dizziness, photosensitivity, yeast infections, liver function test abnormalities, tendonitis/tendon rupture.
Topical Clindamycin Counseling: Patient counseled that this medication may cause skin irritation or allergic reactions.  In the event of skin irritation, the patient was advised to reduce the amount of the drug applied or use it less frequently.   The patient verbalized understanding of the proper use and possible adverse effects of clindamycin.  All of the patient's questions and concerns were addressed.
Include Pregnancy/Lactation Warning?: No
Protopic Counseling: Patient may experience a mild burning sensation during topical application. Protopic is not approved in children less than 2 years of age. There have been case reports of hematologic and skin malignancies in patients using topical calcineurin inhibitors although causality is questionable.
Opioid Counseling: I discussed with the patient the potential side effects of opioids including but not limited to addiction, altered mental status, and depression. I stressed avoiding alcohol, benzodiazepines, muscle relaxants and sleep aids unless specifically okayed by a physician. The patient verbalized understanding of the proper use and possible adverse effects of opioids. All of the patient's questions and concerns were addressed. They were instructed to flush the remaining pills down the toilet if they did not need them for pain.
Finasteride Male Counseling: Finasteride Counseling:  I discussed with the patient the risks of use of finasteride including but not limited to decreased libido, decreased ejaculate volume, gynecomastia, and depression. Women should not handle medication.  All of the patient's questions and concerns were addressed.
Prednisone Counseling:  I discussed with the patient the risks of prolonged use of prednisone including but not limited to weight gain, insomnia, osteoporosis, mood changes, diabetes, susceptibility to infection, glaucoma and high blood pressure.  In cases where prednisone use is prolonged, patients should be monitored with blood pressure checks, serum glucose levels and an eye exam.  Additionally, the patient may need to be placed on GI prophylaxis, PCP prophylaxis, and calcium and vitamin D supplementation and/or a bisphosphonate.  The patient verbalized understanding of the proper use and the possible adverse effects of prednisone.  All of the patient's questions and concerns were addressed.
Sarecycline Counseling: Patient advised regarding possible photosensitivity and discoloration of the teeth, skin, lips, tongue and gums.  Patient instructed to avoid sunlight, if possible.  When exposed to sunlight, patients should wear protective clothing, sunglasses, and sunscreen.  The patient was instructed to call the office immediately if the following severe adverse effects occur:  hearing changes, easy bruising/bleeding, severe headache, or vision changes.  The patient verbalized understanding of the proper use and possible adverse effects of sarecycline.  All of the patient's questions and concerns were addressed.
Albendazole Pregnancy And Lactation Text: This medication is Pregnancy Category C and it isn't known if it is safe during pregnancy. It is also excreted in breast milk.
Protopic Pregnancy And Lactation Text: This medication is Pregnancy Category C. It is unknown if this medication is excreted in breast milk when applied topically.
Opioid Pregnancy And Lactation Text: These medications can lead to premature delivery and should be avoided during pregnancy. These medications are also present in breast milk in small amounts.
Hydroquinone Counseling:  Patient advised that medication may result in skin irritation, lightening (hypopigmentation), dryness, and burning.  In the event of skin irritation, the patient was advised to reduce the amount of the drug applied or use it less frequently.  Rarely, spots that are treated with hydroquinone can become darker (pseudoochronosis).  Should this occur, patient instructed to stop medication and call the office. The patient verbalized understanding of the proper use and possible adverse effects of hydroquinone.  All of the patient's questions and concerns were addressed.
Ketoconazole Pregnancy And Lactation Text: This medication is Pregnancy Category C and it isn't know if it is safe during pregnancy. It is also excreted in breast milk and breast feeding isn't recommended.
Cephalexin Pregnancy And Lactation Text: This medication is Pregnancy Category B and considered safe during pregnancy.  It is also excreted in breast milk but can be used safely for shorter doses.
Enbrel Counseling:  I discussed with the patient the risks of etanercept including but not limited to myelosuppression, immunosuppression, autoimmune hepatitis, demyelinating diseases, lymphoma, and infections.  The patient understands that monitoring is required including a PPD at baseline and must alert us or the primary physician if symptoms of infection or other concerning signs are noted.
Xolair Pregnancy And Lactation Text: This medication is Pregnancy Category B and is considered safe during pregnancy. This medication is excreted in breast milk.
Rifampin Counseling: I discussed with the patient the risks of rifampin including but not limited to liver damage, kidney damage, red-orange body fluids, nausea/vomiting and severe allergy.
Skyrizi Counseling: I discussed with the patient the risks of risankizumab-rzaa including but not limited to immunosuppression, and serious infections.  The patient understands that monitoring is required including a PPD at baseline and must alert us or the primary physician if symptoms of infection or other concerning signs are noted.
Topical Clindamycin Pregnancy And Lactation Text: This medication is Pregnancy Category B and is considered safe during pregnancy. It is unknown if it is excreted in breast milk.
Clindamycin Counseling: I counseled the patient regarding use of clindamycin as an antibiotic for prophylactic and/or therapeutic purposes. Clindamycin is active against numerous classes of bacteria, including skin bacteria. Side effects may include nausea, diarrhea, gastrointestinal upset, rash, hives, yeast infections, and in rare cases, colitis.
Odomzo Counseling- I discussed with the patient the risks of Odomzo including but not limited to nausea, vomiting, diarrhea, constipation, weight loss, changes in the sense of taste, decreased appetite, muscle spasms, and hair loss.  The patient verbalized understanding of the proper use and possible adverse effects of Odomzo.  All of the patient's questions and concerns were addressed.
Humira Counseling:  I discussed with the patient the risks of adalimumab including but not limited to myelosuppression, immunosuppression, autoimmune hepatitis, demyelinating diseases, lymphoma, and serious infections.  The patient understands that monitoring is required including a PPD at baseline and must alert us or the primary physician if symptoms of infection or other concerning signs are noted.
Azathioprine Counseling:  I discussed with the patient the risks of azathioprine including but not limited to myelosuppression, immunosuppression, hepatotoxicity, lymphoma, and infections.  The patient understands that monitoring is required including baseline LFTs, Creatinine, possible TPMP genotyping and weekly CBCs for the first month and then every 2 weeks thereafter.  The patient verbalized understanding of the proper use and possible adverse effects of azathioprine.  All of the patient's questions and concerns were addressed.
Terbinafine Counseling: Patient counseling regarding adverse effects of terbinafine including but not limited to headache, diarrhea, rash, upset stomach, liver function test abnormalities, itching, taste/smell disturbance, nausea, abdominal pain, and flatulence.  There is a rare possibility of liver failure that can occur when taking terbinafine.  The patient understands that a baseline LFT and kidney function test may be required. The patient verbalized understanding of the proper use and possible adverse effects of terbinafine.  All of the patient's questions and concerns were addressed.
Clindamycin Pregnancy And Lactation Text: This medication can be used in pregnancy if certain situations. Clindamycin is also present in breast milk.
Tranexamic Acid Counseling:  Patient advised of the small risk of bleeding problems with tranexamic acid. They were also instructed to call if they developed any nausea, vomiting or diarrhea. All of the patient's questions and concerns were addressed.
Rhofade Counseling: Rhofade is a topical medication which can decrease superficial blood flow where applied. Side effects are uncommon and include stinging, redness and allergic reactions.
Benzoyl Peroxide Counseling: Patient counseled that medicine may cause skin irritation and bleach clothing.  In the event of skin irritation, the patient was advised to reduce the amount of the drug applied or use it less frequently.   The patient verbalized understanding of the proper use and possible adverse effects of benzoyl peroxide.  All of the patient's questions and concerns were addressed.
Ivermectin Counseling:  Patient instructed to take medication on an empty stomach with a full glass of water.  Patient informed of potential adverse effects including but not limited to nausea, diarrhea, dizziness, itching, and swelling of the extremities or lymph nodes.  The patient verbalized understanding of the proper use and possible adverse effects of ivermectin.  All of the patient's questions and concerns were addressed.
Topical Sulfur Applications Counseling: Topical Sulfur Counseling: Patient counseled that this medication may cause skin irritation or allergic reactions.  In the event of skin irritation, the patient was advised to reduce the amount of the drug applied or use it less frequently.   The patient verbalized understanding of the proper use and possible adverse effects of topical sulfur application.  All of the patient's questions and concerns were addressed.
Finasteride Pregnancy And Lactation Text: This medication is absolutely contraindicated during pregnancy. It is unknown if it is excreted in breast milk.
Gabapentin Counseling: I discussed with the patient the risks of gabapentin including but not limited to dizziness, somnolence, fatigue and ataxia.
Azathioprine Pregnancy And Lactation Text: This medication is Pregnancy Category D and isn't considered safe during pregnancy. It is unknown if this medication is excreted in breast milk.
Tranexamic Acid Pregnancy And Lactation Text: It is unknown if this medication is safe during pregnancy or breast feeding.
Terbinafine Pregnancy And Lactation Text: This medication is Pregnancy Category B and is considered safe during pregnancy. It is also excreted in breast milk and breast feeding isn't recommended.
Rhofade Pregnancy And Lactation Text: This medication has not been assigned a Pregnancy Risk Category. It is unknown if the medication is excreted in breast milk.
Otezla Counseling: The side effects of Otezla were discussed with the patient, including but not limited to worsening or new depression, weight loss, diarrhea, nausea, upper respiratory tract infection, and headache. Patient instructed to call the office should any adverse effect occur.  The patient verbalized understanding of the proper use and possible adverse effects of Otezla.  All the patient's questions and concerns were addressed.
Tetracycline Counseling: Patient counseled regarding possible photosensitivity and increased risk for sunburn.  Patient instructed to avoid sunlight, if possible.  When exposed to sunlight, patients should wear protective clothing, sunglasses, and sunscreen.  The patient was instructed to call the office immediately if the following severe adverse effects occur:  hearing changes, easy bruising/bleeding, severe headache, or vision changes.  The patient verbalized understanding of the proper use and possible adverse effects of tetracycline.  All of the patient's questions and concerns were addressed. Patient understands to avoid pregnancy while on therapy due to potential birth defects.
Benzoyl Peroxide Pregnancy And Lactation Text: This medication is Pregnancy Category C. It is unknown if benzoyl peroxide is excreted in breast milk.
Arava Counseling:  Patient counseled regarding adverse effects of Arava including but not limited to nausea, vomiting, abnormalities in liver function tests. Patients may develop mouth sores, rash, diarrhea, and abnormalities in blood counts. The patient understands that monitoring is required including LFTs and blood counts.  There is a rare possibility of scarring of the liver and lung problems that can occur when taking methotrexate. Persistent nausea, loss of appetite, pale stools, dark urine, cough, and shortness of breath should be reported immediately. Patient advised to discontinue Arava treatment and consult with a physician prior to attempting conception. The patient will have to undergo a treatment to eliminate Arava from the body prior to conception.
Topical Sulfur Applications Pregnancy And Lactation Text: This medication is Pregnancy Category C and has an unknown safety profile during pregnancy. It is unknown if this topical medication is excreted in breast milk.
Carac Counseling:  I discussed with the patient the risks of Carac including but not limited to erythema, scaling, itching, weeping, crusting, and pain.
Stelara Counseling:  I discussed with the patient the risks of ustekinumab including but not limited to immunosuppression, malignancy, posterior leukoencephalopathy syndrome, and serious infections.  The patient understands that monitoring is required including a PPD at baseline and must alert us or the primary physician if symptoms of infection or other concerning signs are noted.
Acitretin Counseling:  I discussed with the patient the risks of acitretin including but not limited to hair loss, dry lips/skin/eyes, liver damage, hyperlipidemia, depression/suicidal ideation, photosensitivity.  Serious rare side effects can include but are not limited to pancreatitis, pseudotumor cerebri, bony changes, clot formation/stroke/heart attack.  Patient understands that alcohol is contraindicated since it can result in liver toxicity and significantly prolong the elimination of the drug by many years.
Doxycycline Counseling:  Patient counseled regarding possible photosensitivity and increased risk for sunburn.  Patient instructed to avoid sunlight, if possible.  When exposed to sunlight, patients should wear protective clothing, sunglasses, and sunscreen.  The patient was instructed to call the office immediately if the following severe adverse effects occur:  hearing changes, easy bruising/bleeding, severe headache, or vision changes.  The patient verbalized understanding of the proper use and possible adverse effects of doxycycline.  All of the patient's questions and concerns were addressed.
Imiquimod Counseling:  I discussed with the patient the risks of imiquimod including but not limited to erythema, scaling, itching, weeping, crusting, and pain.  Patient understands that the inflammatory response to imiquimod is variable from person to person and was educated regarded proper titration schedule.  If flu-like symptoms develop, patient knows to discontinue the medication and contact us.
Valtrex Counseling: I discussed with the patient the risks of valacyclovir including but not limited to kidney damage, nausea, vomiting and severe allergy.  The patient understands that if the infection seems to be worsening or is not improving, they are to call.
Carac Pregnancy And Lactation Text: This medication is Pregnancy Category X and contraindicated in pregnancy and in women who may become pregnant. It is unknown if this medication is excreted in breast milk.
Gabapentin Pregnancy And Lactation Text: This medication is Pregnancy Category C and isn't considered safe during pregnancy. It is excreted in breast milk.
Cellcept Counseling:  I discussed with the patient the risks of mycophenolate mofetil including but not limited to infection/immunosuppression, GI upset, hypokalemia, hypercholesterolemia, bone marrow suppression, lymphoproliferative disorders, malignancy, GI ulceration/bleed/perforation, colitis, interstitial lung disease, kidney failure, progressive multifocal leukoencephalopathy, and birth defects.  The patient understands that monitoring is required including a baseline creatinine and regular CBC testing. In addition, patient must alert us immediately if symptoms of infection or other concerning signs are noted.
Solaraze Counseling:  I discussed with the patient the risks of Solaraze including but not limited to erythema, scaling, itching, weeping, crusting, and pain.
Wartpeel Counseling:  I discussed with the patient the risks of Wartpeel including but not limited to erythema, scaling, itching, weeping, crusting, and pain.
Doxycycline Pregnancy And Lactation Text: This medication is Pregnancy Category D and not consider safe during pregnancy. It is also excreted in breast milk but is considered safe for shorter treatment courses.
Otezla Pregnancy And Lactation Text: This medication is Pregnancy Category C and it isn't known if it is safe during pregnancy. It is unknown if it is excreted in breast milk.
Ilumya Counseling: I discussed with the patient the risks of tildrakizumab including but not limited to immunosuppression, malignancy, posterior leukoencephalopathy syndrome, and serious infections.  The patient understands that monitoring is required including a PPD at baseline and must alert us or the primary physician if symptoms of infection or other concerning signs are noted.
Cimetidine Counseling:  I discussed with the patient the risks of Cimetidine including but not limited to gynecomastia, headache, diarrhea, nausea, drowsiness, arrhythmias, pancreatitis, skin rashes, psychosis, bone marrow suppression and kidney toxicity.
Oxybutynin Counseling:  I discussed with the patient the risks of oxybutynin including but not limited to skin rash, drowsiness, dry mouth, difficulty urinating, and blurred vision.
Erythromycin Counseling:  I discussed with the patient the risks of erythromycin including but not limited to GI upset, allergic reaction, drug rash, diarrhea, increase in liver enzymes, and yeast infections.
Valtrex Pregnancy And Lactation Text: this medication is Pregnancy Category B and is considered safe during pregnancy. This medication is not directly found in breast milk but it's metabolite acyclovir is present.
Minoxidil Counseling: Minoxidil is a topical medication which can increase blood flow where it is applied. It is uncertain how this medication increases hair growth. Side effects are uncommon and include stinging and allergic reactions.
Acitretin Pregnancy And Lactation Text: This medication is Pregnancy Category X and should not be given to women who are pregnant or may become pregnant in the future. This medication is excreted in breast milk.
Clofazimine Counseling:  I discussed with the patient the risks of clofazimine including but not limited to skin and eye pigmentation, liver damage, nausea/vomiting, gastrointestinal bleeding and allergy.
Glycopyrrolate Counseling:  I discussed with the patient the risks of glycopyrrolate including but not limited to skin rash, drowsiness, dry mouth, difficulty urinating, and blurred vision.
Zyclara Counseling:  I discussed with the patient the risks of imiquimod including but not limited to erythema, scaling, itching, weeping, crusting, and pain.  Patient understands that the inflammatory response to imiquimod is variable from person to person and was educated regarded proper titration schedule.  If flu-like symptoms develop, patient knows to discontinue the medication and contact us.
Glycopyrrolate Pregnancy And Lactation Text: This medication is Pregnancy Category B and is considered safe during pregnancy. It is unknown if it is excreted breast milk.
Infliximab Counseling:  I discussed with the patient the risks of infliximab including but not limited to myelosuppression, immunosuppression, autoimmune hepatitis, demyelinating diseases, lymphoma, and serious infections.  The patient understands that monitoring is required including a PPD at baseline and must alert us or the primary physician if symptoms of infection or other concerning signs are noted.
Taltz Counseling: I discussed with the patient the risks of ixekizumab including but not limited to immunosuppression, serious infections, worsening of inflammatory bowel disease and drug reactions.  The patient understands that monitoring is required including a PPD at baseline and must alert us or the primary physician if symptoms of infection or other concerning signs are noted.
Bexarotene Counseling:  I discussed with the patient the risks of bexarotene including but not limited to hair loss, dry lips/skin/eyes, liver abnormalities, hyperlipidemia, pancreatitis, depression/suicidal ideation, photosensitivity, drug rash/allergic reactions, hypothyroidism, anemia, leukopenia, infection, cataracts, and teratogenicity.  Patient understands that they will need regular blood tests to check lipid profile, liver function tests, white blood cell count, thyroid function tests and pregnancy test if applicable.
Fluconazole Counseling:  Patient counseled regarding adverse effects of fluconazole including but not limited to headache, diarrhea, nausea, upset stomach, liver function test abnormalities, taste disturbance, and stomach pain.  There is a rare possibility of liver failure that can occur when taking fluconazole.  The patient understands that monitoring of LFTs and kidney function test may be required, especially at baseline. The patient verbalized understanding of the proper use and possible adverse effects of fluconazole.  All of the patient's questions and concerns were addressed.
5-Fu Counseling: 5-Fluorouracil Counseling:  I discussed with the patient the risks of 5-fluorouracil including but not limited to erythema, scaling, itching, weeping, crusting, and pain.
Cyclophosphamide Counseling:  I discussed with the patient the risks of cyclophosphamide including but not limited to hair loss, hormonal abnormalities, decreased fertility, abdominal pain, diarrhea, nausea and vomiting, bone marrow suppression and infection. The patient understands that monitoring is required while taking this medication.
Colchicine Counseling:  Patient counseled regarding adverse effects including but not limited to stomach upset (nausea, vomiting, stomach pain, or diarrhea).  Patient instructed to limit alcohol consumption while taking this medication.  Colchicine may reduce blood counts especially with prolonged use.  The patient understands that monitoring of kidney function and blood counts may be required, especially at baseline. The patient verbalized understanding of the proper use and possible adverse effects of colchicine.  All of the patient's questions and concerns were addressed.
Erythromycin Pregnancy And Lactation Text: This medication is Pregnancy Category B and is considered safe during pregnancy. It is also excreted in breast milk.
Cyclophosphamide Pregnancy And Lactation Text: This medication is Pregnancy Category D and it isn't considered safe during pregnancy. This medication is excreted in breast milk.
Solaraze Pregnancy And Lactation Text: This medication is Pregnancy Category B and is considered safe. There is some data to suggest avoiding during the third trimester. It is unknown if this medication is excreted in breast milk.
Azithromycin Counseling:  I discussed with the patient the risks of azithromycin including but not limited to GI upset, allergic reaction, drug rash, diarrhea, and yeast infections.
Bexarotene Pregnancy And Lactation Text: This medication is Pregnancy Category X and should not be given to women who are pregnant or may become pregnant. This medication should not be used if you are breast feeding.
Cimzia Counseling:  I discussed with the patient the risks of Cimzia including but not limited to immunosuppression, allergic reactions and infections.  The patient understands that monitoring is required including a PPD at baseline and must alert us or the primary physician if symptoms of infection or other concerning signs are noted.
Drysol Counseling:  I discussed with the patient the risks of drysol/aluminum chloride including but not limited to skin rash, itching, irritation, burning.
Cimzia Pregnancy And Lactation Text: This medication crosses the placenta but can be considered safe in certain situations. Cimzia may be excreted in breast milk.
Tremfya Counseling: I discussed with the patient the risks of guselkumab including but not limited to immunosuppression, serious infections, worsening of inflammatory bowel disease and drug reactions.  The patient understands that monitoring is required including a PPD at baseline and must alert us or the primary physician if symptoms of infection or other concerning signs are noted.
Isotretinoin Counseling: Patient should get monthly blood tests, not donate blood, not drive at night if vision affected, not share medication, and not undergo elective surgery for 6 months after tx completed. Side effects reviewed, pt to contact office should one occur.
Metronidazole Counseling:  I discussed with the patient the risks of metronidazole including but not limited to seizures, nausea/vomiting, a metallic taste in the mouth, nausea/vomiting and severe allergy.
Topical Retinoid counseling:  Patient advised to apply a pea-sized amount only at bedtime and wait 30 minutes after washing their face before applying.  If too drying, patient may add a non-comedogenic moisturizer. The patient verbalized understanding of the proper use and possible adverse effects of retinoids.  All of the patient's questions and concerns were addressed.
Doxepin Counseling:  Patient advised that the medication is sedating and not to drive a car after taking this medication. Patient informed of potential adverse effects including but not limited to dry mouth, urinary retention, and blurry vision.  The patient verbalized understanding of the proper use and possible adverse effects of doxepin.  All of the patient's questions and concerns were addressed.
Hydroxychloroquine Counseling:  I discussed with the patient that a baseline ophthalmologic exam is needed at the start of therapy and every year thereafter while on therapy. A CBC may also be warranted for monitoring.  The side effects of this medication were discussed with the patient, including but not limited to agranulocytosis, aplastic anemia, seizures, rashes, retinopathy, and liver toxicity. Patient instructed to call the office should any adverse effect occur.  The patient verbalized understanding of the proper use and possible adverse effects of Plaquenil.  All the patient's questions and concerns were addressed.
Propranolol Counseling:  I discussed with the patient the risks of propranolol including but not limited to low heart rate, low blood pressure, low blood sugar, restlessness and increased cold sensitivity. They should call the office if they experience any of these side effects.
Mirvaso Counseling: Mirvaso is a topical medication which can decrease superficial blood flow where applied. Side effects are uncommon and include stinging, redness and allergic reactions.
Propranolol Pregnancy And Lactation Text: This medication is Pregnancy Category C and it isn't known if it is safe during pregnancy. It is excreted in breast milk.
Drysol Pregnancy And Lactation Text: This medication is considered safe during pregnancy and breast feeding.
Rituxan Counseling:  I discussed with the patient the risks of Rituxan infusions. Side effects can include infusion reactions, severe drug rashes including mucocutaneous reactions, reactivation of latent hepatitis and other infections and rarely progressive multifocal leukoencephalopathy.  All of the patient's questions and concerns were addressed.
Doxepin Pregnancy And Lactation Text: This medication is Pregnancy Category C and it isn't known if it is safe during pregnancy. It is also excreted in breast milk and breast feeding isn't recommended.
Isotretinoin Pregnancy And Lactation Text: This medication is Pregnancy Category X and is considered extremely dangerous during pregnancy. It is unknown if it is excreted in breast milk.
Griseofulvin Counseling:  I discussed with the patient the risks of griseofulvin including but not limited to photosensitivity, cytopenia, liver damage, nausea/vomiting and severe allergy.  The patient understands that this medication is best absorbed when taken with a fatty meal (e.g., ice cream or french fries).
Azithromycin Pregnancy And Lactation Text: This medication is considered safe during pregnancy and is also secreted in breast milk.
Hydroxychloroquine Pregnancy And Lactation Text: This medication has been shown to cause fetal harm but it isn't assigned a Pregnancy Risk Category. There are small amounts excreted in breast milk.
Niacinamide Counseling: I recommended taking niacin or niacinamide, also know as vitamin B3, twice daily. Recent evidence suggests that taking vitamin B3 (500 mg twice daily) can reduce the risk of actinic keratoses and non-melanoma skin cancers. Side effects of vitamin B3 include flushing and headache.
Bactrim Counseling:  I discussed with the patient the risks of sulfa antibiotics including but not limited to GI upset, allergic reaction, drug rash, diarrhea, dizziness, photosensitivity, and yeast infections.  Rarely, more serious reactions can occur including but not limited to aplastic anemia, agranulocytosis, methemoglobinemia, blood dyscrasias, liver or kidney failure, lung infiltrates or desquamative/blistering drug rashes.
Cyclosporine Counseling:  I discussed with the patient the risks of cyclosporine including but not limited to hypertension, gingival hyperplasia,myelosuppression, immunosuppression, liver damage, kidney damage, neurotoxicity, lymphoma, and serious infections. The patient understands that monitoring is required including baseline blood pressure, CBC, CMP, lipid panel and uric acid, and then 1-2 times monthly CMP and blood pressure.
Hydroxyzine Counseling: Patient advised that the medication is sedating and not to drive a car after taking this medication.  Patient informed of potential adverse effects including but not limited to dry mouth, urinary retention, and blurry vision.  The patient verbalized understanding of the proper use and possible adverse effects of hydroxyzine.  All of the patient's questions and concerns were addressed.
Elidel Counseling: Patient may experience a mild burning sensation during topical application. Elidel is not approved in children less than 2 years of age. There have been case reports of hematologic and skin malignancies in patients using topical calcineurin inhibitors although causality is questionable.
Birth Control Pills Counseling: Birth Control Pill Counseling: I discussed with the patient the potential side effects of OCPs including but not limited to increased risk of stroke, heart attack, thrombophlebitis, deep venous thrombosis, hepatic adenomas, breast changes, GI upset, headaches, and depression.  The patient verbalized understanding of the proper use and possible adverse effects of OCPs. All of the patient's questions and concerns were addressed.
Rituxan Pregnancy And Lactation Text: This medication is Pregnancy Category C and it isn't know if it is safe during pregnancy. It is unknown if this medication is excreted in breast milk but similar antibodies are known to be excreted.
Metronidazole Pregnancy And Lactation Text: This medication is Pregnancy Category B and considered safe during pregnancy.  It is also excreted in breast milk.
Dapsone Counseling: I discussed with the patient the risks of dapsone including but not limited to hemolytic anemia, agranulocytosis, rashes, methemoglobinemia, kidney failure, peripheral neuropathy, headaches, GI upset, and liver toxicity.  Patients who start dapsone require monitoring including baseline LFTs and weekly CBCs for the first month, then every month thereafter.  The patient verbalized understanding of the proper use and possible adverse effects of dapsone.  All of the patient's questions and concerns were addressed.
Griseofulvin Pregnancy And Lactation Text: This medication is Pregnancy Category X and is known to cause serious birth defects. It is unknown if this medication is excreted in breast milk but breast feeding should be avoided.
Cosentyx Counseling:  I discussed with the patient the risks of Cosentyx including but not limited to worsening of Crohn's disease, immunosuppression, allergic reactions and infections.  The patient understands that monitoring is required including a PPD at baseline and must alert us or the primary physician if symptoms of infection or other concerning signs are noted.

## 2020-05-22 NOTE — PROCEDURE: INTRALESIONAL KENALOG
Lot # For Kenalog (Optional): [id number]
Administered By (Optional): Mayra WATERMAN
Consent: The risks of atrophy, bleeding, scarring and infection were reviewed with the patient. Re-treatment may be necessary.
Expiration Date For Kenalog (Optional): 03/2021
Total Volume (Ccs): 1.0
Treatment Number (Optional): 1
Detail Level: Detailed
Kenalog Preparation: Kenalog
Include Z78.9 (Other Specified Conditions Influencing Health Status) As An Associated Diagnosis?: Yes
X Size Of Lesion In Cm (Optional): 0
Medical Necessity Clause: This procedure was medically necessary because the lesions that were treated were:
Concentration Of Kenalog Solution Injected (Mg/Ml): 5.0

## 2020-06-26 DIAGNOSIS — F90.0 ATTENTION DEFICIT HYPERACTIVITY DISORDER (ADHD), PREDOMINANTLY INATTENTIVE TYPE: ICD-10-CM

## 2020-06-26 DIAGNOSIS — M62.838 MUSCLE SPASM: ICD-10-CM

## 2020-06-26 DIAGNOSIS — F41.9 ANXIETY: ICD-10-CM

## 2020-06-26 RX ORDER — CYCLOBENZAPRINE HCL 10 MG
10 TABLET ORAL 3 TIMES DAILY PRN
Qty: 90 TAB | Refills: 1 | Status: SHIPPED | OUTPATIENT
Start: 2020-06-26 | End: 2021-06-03 | Stop reason: SDUPTHER

## 2020-06-26 RX ORDER — DEXTROAMPHETAMINE SACCHARATE, AMPHETAMINE ASPARTATE MONOHYDRATE, DEXTROAMPHETAMINE SULFATE AND AMPHETAMINE SULFATE 5; 5; 5; 5 MG/1; MG/1; MG/1; MG/1
20 CAPSULE, EXTENDED RELEASE ORAL EVERY MORNING
Qty: 90 CAP | Refills: 0 | Status: SHIPPED | OUTPATIENT
Start: 2020-06-26 | End: 2020-10-15 | Stop reason: SDUPTHER

## 2020-06-26 RX ORDER — DIAZEPAM 2 MG/1
2 TABLET ORAL
Qty: 90 TAB | Refills: 1 | Status: SHIPPED | OUTPATIENT
Start: 2020-06-26 | End: 2021-03-25

## 2020-06-26 RX ORDER — DEXTROAMPHETAMINE SACCHARATE, AMPHETAMINE ASPARTATE, DEXTROAMPHETAMINE SULFATE AND AMPHETAMINE SULFATE 2.5; 2.5; 2.5; 2.5 MG/1; MG/1; MG/1; MG/1
10 TABLET ORAL DAILY
Qty: 90 TAB | Refills: 0 | Status: SHIPPED | OUTPATIENT
Start: 2020-06-26 | End: 2020-10-15 | Stop reason: SDUPTHER

## 2020-07-14 ENCOUNTER — HOSPITAL ENCOUNTER (OUTPATIENT)
Dept: LAB | Facility: MEDICAL CENTER | Age: 40
End: 2020-07-14
Attending: OBSTETRICS & GYNECOLOGY
Payer: COMMERCIAL

## 2020-07-14 LAB — TSH SERPL DL<=0.005 MIU/L-ACNC: 2.59 UIU/ML (ref 0.38–5.33)

## 2020-07-14 PROCEDURE — 84443 ASSAY THYROID STIM HORMONE: CPT

## 2020-07-14 PROCEDURE — 36415 COLL VENOUS BLD VENIPUNCTURE: CPT

## 2020-08-06 ENCOUNTER — HOSPITAL ENCOUNTER (OUTPATIENT)
Dept: LAB | Facility: MEDICAL CENTER | Age: 40
End: 2020-08-06
Attending: OBSTETRICS & GYNECOLOGY
Payer: COMMERCIAL

## 2020-08-06 PROCEDURE — 36415 COLL VENOUS BLD VENIPUNCTURE: CPT

## 2020-08-06 PROCEDURE — 87491 CHLMYD TRACH DNA AMP PROBE: CPT

## 2020-08-06 PROCEDURE — 86780 TREPONEMA PALLIDUM: CPT

## 2020-08-06 PROCEDURE — 87591 N.GONORRHOEAE DNA AMP PROB: CPT

## 2020-08-06 PROCEDURE — 86803 HEPATITIS C AB TEST: CPT

## 2020-08-06 PROCEDURE — 87340 HEPATITIS B SURFACE AG IA: CPT

## 2020-08-06 PROCEDURE — 86706 HEP B SURFACE ANTIBODY: CPT

## 2020-08-06 PROCEDURE — 87389 HIV-1 AG W/HIV-1&-2 AB AG IA: CPT

## 2020-08-07 LAB
HBV SURFACE AB SERPL IA-ACNC: 7.59 MIU/ML (ref 0–10)
HBV SURFACE AG SER QL: NORMAL
HCV AB SER QL: NORMAL
HIV 1+2 AB+HIV1 P24 AG SERPL QL IA: NORMAL
TREPONEMA PALLIDUM IGG+IGM AB [PRESENCE] IN SERUM OR PLASMA BY IMMUNOASSAY: NORMAL

## 2020-08-10 LAB
C TRACH DNA SPEC QL NAA+PROBE: NEGATIVE
N GONORRHOEA DNA SPEC QL NAA+PROBE: NEGATIVE
SPECIMEN SOURCE: NORMAL

## 2020-08-31 ENCOUNTER — HOSPITAL ENCOUNTER (OUTPATIENT)
Dept: LAB | Facility: MEDICAL CENTER | Age: 40
End: 2020-08-31
Attending: OBSTETRICS & GYNECOLOGY
Payer: COMMERCIAL

## 2020-08-31 LAB — B-HCG SERPL-ACNC: <1 MIU/ML (ref 0–5)

## 2020-08-31 PROCEDURE — 84702 CHORIONIC GONADOTROPIN TEST: CPT

## 2020-08-31 PROCEDURE — 36415 COLL VENOUS BLD VENIPUNCTURE: CPT

## 2020-09-26 ENCOUNTER — HOSPITAL ENCOUNTER (OUTPATIENT)
Dept: LAB | Facility: MEDICAL CENTER | Age: 40
End: 2020-09-26
Attending: OBSTETRICS & GYNECOLOGY
Payer: COMMERCIAL

## 2020-09-26 LAB — B-HCG SERPL-ACNC: <1 MIU/ML (ref 0–5)

## 2020-09-26 PROCEDURE — 84702 CHORIONIC GONADOTROPIN TEST: CPT

## 2020-09-26 PROCEDURE — 36415 COLL VENOUS BLD VENIPUNCTURE: CPT

## 2020-10-15 ENCOUNTER — TELEMEDICINE (OUTPATIENT)
Dept: MEDICAL GROUP | Age: 40
End: 2020-10-15
Payer: COMMERCIAL

## 2020-10-15 VITALS
HEIGHT: 61 IN | BODY MASS INDEX: 28.32 KG/M2 | RESPIRATION RATE: 12 BRPM | WEIGHT: 150 LBS | HEART RATE: 72 BPM | TEMPERATURE: 98.6 F

## 2020-10-15 DIAGNOSIS — E78.2 MIXED HYPERLIPIDEMIA: ICD-10-CM

## 2020-10-15 DIAGNOSIS — G44.209 TENSION HEADACHE: ICD-10-CM

## 2020-10-15 DIAGNOSIS — E03.9 ACQUIRED HYPOTHYROIDISM: ICD-10-CM

## 2020-10-15 DIAGNOSIS — F90.0 ATTENTION DEFICIT HYPERACTIVITY DISORDER (ADHD), PREDOMINANTLY INATTENTIVE TYPE: ICD-10-CM

## 2020-10-15 DIAGNOSIS — H02.9 EYELID DISORDER: ICD-10-CM

## 2020-10-15 DIAGNOSIS — G43.109 MIGRAINE WITH AURA AND WITHOUT STATUS MIGRAINOSUS, NOT INTRACTABLE: ICD-10-CM

## 2020-10-15 DIAGNOSIS — E55.9 VITAMIN D DEFICIENCY: ICD-10-CM

## 2020-10-15 DIAGNOSIS — F41.9 ANXIETY: ICD-10-CM

## 2020-10-15 DIAGNOSIS — E53.8 B12 DEFICIENCY: ICD-10-CM

## 2020-10-15 PROCEDURE — 99214 OFFICE O/P EST MOD 30 MIN: CPT | Mod: 95,CR | Performed by: INTERNAL MEDICINE

## 2020-10-15 RX ORDER — DEXTROAMPHETAMINE SACCHARATE, AMPHETAMINE ASPARTATE, DEXTROAMPHETAMINE SULFATE AND AMPHETAMINE SULFATE 2.5; 2.5; 2.5; 2.5 MG/1; MG/1; MG/1; MG/1
10 TABLET ORAL DAILY
Qty: 90 TAB | Refills: 0 | Status: SHIPPED | OUTPATIENT
Start: 2020-10-15 | End: 2021-02-11 | Stop reason: SDUPTHER

## 2020-10-15 RX ORDER — LEVOTHYROXINE SODIUM 0.03 MG/1
25 TABLET ORAL
Qty: 90 TAB | Refills: 3 | Status: SHIPPED | OUTPATIENT
Start: 2020-10-15 | End: 2021-06-03 | Stop reason: SDUPTHER

## 2020-10-15 RX ORDER — LEVOTHYROXINE SODIUM 0.03 MG/1
TABLET ORAL
COMMUNITY
Start: 2020-09-21 | End: 2020-10-15 | Stop reason: SDUPTHER

## 2020-10-15 RX ORDER — DEXTROAMPHETAMINE SACCHARATE, AMPHETAMINE ASPARTATE MONOHYDRATE, DEXTROAMPHETAMINE SULFATE AND AMPHETAMINE SULFATE 5; 5; 5; 5 MG/1; MG/1; MG/1; MG/1
20 CAPSULE, EXTENDED RELEASE ORAL EVERY MORNING
Qty: 90 CAP | Refills: 0 | Status: SHIPPED | OUTPATIENT
Start: 2020-10-15 | End: 2021-02-11 | Stop reason: SDUPTHER

## 2020-10-15 RX ORDER — CYANOCOBALAMIN 1000 UG/ML
1000 INJECTION, SOLUTION INTRAMUSCULAR; SUBCUTANEOUS
Qty: 6 ML | Refills: 1 | Status: SHIPPED | OUTPATIENT
Start: 2020-10-15 | End: 2022-03-31 | Stop reason: SDUPTHER

## 2020-10-15 RX ORDER — BUTALBITAL, ACETAMINOPHEN AND CAFFEINE 50; 325; 40 MG/1; MG/1; MG/1
TABLET ORAL
COMMUNITY
Start: 2020-07-21 | End: 2021-08-03 | Stop reason: SDUPTHER

## 2020-10-15 NOTE — PROGRESS NOTES
Virtual Visit: Established Patient   This visit was conducted via Zoom using secure and encrypted videoconferencing technology. The patient was in a private location in the state of [place].    The patient's identity was confirmed and verbal consent was obtained for this virtual visit.    Subjective:   CC:   Chief Complaint   Patient presents with   • Follow-Up   • ADHD       Sara Taylor is a 40 y.o. female presenting for evaluation and management of:  Refills of Adderall for ADHD, refill of other medications, and  The patient is here for followup of chronic medical problems listed below. The patient is compliant with medications and having no side effects from them. Denies chest pain, abdominal pain, dyspnea, myalgias, or cough.     Since last visit patient was diagnosed with borderline hypothyroidism started on 25 mcg of Synthroid daily by her infertility doctor whom she is seeing since she has been unable to get pregnant for the last 5 years despite attempting to do so.  Most recent printed test was negative.  Recently.   She has not taken her Adderall for several months now all time to get pregnant.  However she is prepared to give up her attempts at this and resume her Adderall.  She may not do this for a few more months never wants a refill for 3-month supply at this time.  Also would like a 3-month supply of her Synthroid.  Needs repeat TSH since 1 has not been done since starting therapy.      Patient Active Problem List    Diagnosis Date Noted   • Acquired hypothyroidism 10/15/2020   • Anxiety 03/29/2018   • Tension headache 03/29/2018   • Attention deficit hyperactivity disorder (ADHD), predominantly inattentive type 03/29/2018   • Multiple allergies 03/29/2018   • Vitamin D deficiency disease 08/15/2016   • Migraine with aura and without status migrainosus, not intractable 06/09/2015   • Mixed hyperlipidemia 10/07/2013     No Known Allergies  Outpatient Medications Prior to Visit   Medication Sig  Dispense Refill   • butalbital/APAP/caffeine -40 mg (FIORICET) -40 MG Tab      • cyclobenzaprine (FLEXERIL) 10 MG Tab Take 1 Tab by mouth 3 times a day as needed. 90 Tab 1   • Cholecalciferol (VITAMIN D3) 5000 units Tab Take  by mouth. 30 Tab    • ferrous sulfate 325 (65 FE) MG tablet Take 1 Tab by mouth every morning with breakfast. 30 Tab 3   • levothyroxine (SYNTHROID) 25 MCG Tab      • NON SPECIFIED Latisse solution: place 1 drop on each eye daily; apply to eyelid line for eyelid growth (Patient not taking: Reported on 10/15/2020) 1 Each 11   • cyanocobalamin (VITAMIN B-12) 1000 MCG/ML Solution 1 mL by Intramuscular route every 30 days. Include syringes and needles 4 mL 4   • NON SPECIFIED Latisse solution: place 1 drop on each eye daily; apply to eyelid line for eyelid growth 1 Each 11   • albuterol 108 (90 Base) MCG/ACT Aero Soln inhalation aerosol Inhale 1-2 Puffs by mouth every 6 hours as needed for Shortness of Breath. (Patient not taking: Reported on 10/15/2020) 8.5 g 0   • Cyanocobalamin (VITAMIN B-12) 1000 MCG/15ML Liquid Take  by mouth.     • Prenatal Multivit-Min-Fe-FA (PRENATAL VITAMINS PO) Take  by mouth.       No facility-administered medications prior to visit.             ROS     Denies any recent fevers or chills. No nausea or vomiting. No chest pains or shortness of breath.     No Known Allergies  Outpatient Medications Prior to Visit   Medication Sig Dispense Refill   • butalbital/APAP/caffeine -40 mg (FIORICET) -40 MG Tab      • cyclobenzaprine (FLEXERIL) 10 MG Tab Take 1 Tab by mouth 3 times a day as needed. 90 Tab 1   • Cholecalciferol (VITAMIN D3) 5000 units Tab Take  by mouth. 30 Tab    • ferrous sulfate 325 (65 FE) MG tablet Take 1 Tab by mouth every morning with breakfast. 30 Tab 3   • levothyroxine (SYNTHROID) 25 MCG Tab      • NON SPECIFIED Latisse solution: place 1 drop on each eye daily; apply to eyelid line for eyelid growth (Patient not taking: Reported on  10/15/2020) 1 Each 11   • cyanocobalamin (VITAMIN B-12) 1000 MCG/ML Solution 1 mL by Intramuscular route every 30 days. Include syringes and needles 4 mL 4   • NON SPECIFIED Latisse solution: place 1 drop on each eye daily; apply to eyelid line for eyelid growth 1 Each 11   • albuterol 108 (90 Base) MCG/ACT Aero Soln inhalation aerosol Inhale 1-2 Puffs by mouth every 6 hours as needed for Shortness of Breath. (Patient not taking: Reported on 10/15/2020) 8.5 g 0   • Cyanocobalamin (VITAMIN B-12) 1000 MCG/15ML Liquid Take  by mouth.     • Prenatal Multivit-Min-Fe-FA (PRENATAL VITAMINS PO) Take  by mouth.       No facility-administered medications prior to visit.      Current medicines (including changes today)  Current Outpatient Medications   Medication Sig Dispense Refill   • butalbital/APAP/caffeine -40 mg (FIORICET) -40 MG Tab      • cyanocobalamin (VITAMIN B-12) 1000 MCG/ML Solution 1 mL by Intramuscular route every 30 days for 30 days. Include syringes and needles 6 mL 1   • levothyroxine (SYNTHROID) 25 MCG Tab Take 1 Tab by mouth Every morning on an empty stomach. 90 Tab 3   • amphetamine-dextroamphetamine (ADDERALL XR, 20MG,) 20 MG per XR capsule Take 1 Cap by mouth every morning for 90 days. 90 Cap 0   • amphetamine-dextroamphetamine (ADDERALL) 10 MG Tab Take 1 Tab by mouth every day for 90 days. 90 Tab 0   • NON SPECIFIED Latisse solution (generic ok): place 1 drop on each eye daily; apply to eyelid line for eyelid growth 1 Each 11   • NON SPECIFIED Latisse solution (generic ok): place 1 drop on each eye daily; apply to eyelid line for eyelid growth 1 Each 11   • cyclobenzaprine (FLEXERIL) 10 MG Tab Take 1 Tab by mouth 3 times a day as needed. 90 Tab 1   • Cholecalciferol (VITAMIN D3) 5000 units Tab Take  by mouth. 30 Tab    • ferrous sulfate 325 (65 FE) MG tablet Take 1 Tab by mouth every morning with breakfast. 30 Tab 3   • NON SPECIFIED Latisse solution: place 1 drop on each eye daily; apply  "to eyelid line for eyelid growth (Patient not taking: Reported on 10/15/2020) 1 Each 11   • albuterol 108 (90 Base) MCG/ACT Aero Soln inhalation aerosol Inhale 1-2 Puffs by mouth every 6 hours as needed for Shortness of Breath. (Patient not taking: Reported on 10/15/2020) 8.5 g 0   • Prenatal Multivit-Min-Fe-FA (PRENATAL VITAMINS PO) Take  by mouth.       No current facility-administered medications for this visit.        Patient Active Problem List    Diagnosis Date Noted   • Acquired hypothyroidism 10/15/2020   • Anxiety 03/29/2018   • Tension headache 03/29/2018   • Attention deficit hyperactivity disorder (ADHD), predominantly inattentive type 03/29/2018   • Multiple allergies 03/29/2018   • Vitamin D deficiency disease 08/15/2016   • Migraine with aura and without status migrainosus, not intractable 06/09/2015   • Mixed hyperlipidemia 10/07/2013       Family History   Problem Relation Age of Onset   • Thyroid Mother    • Heart Disease Father    • Cancer Father 65        colon       She  has a past medical history of ADD (attention deficit disorder with hyperactivity).  She  has a past surgical history that includes mammoplasty augmentation (9/1/2010) and liposuction (9/1/2010).       Objective:   Pulse 72   Temp 37 °C (98.6 °F)   Resp 12   Ht 1.549 m (5' 1\")   Wt 68 kg (150 lb)   LMP 09/27/2020 (Within Days)   BMI 28.34 kg/m²   Pulse 72   Temp 37 °C (98.6 °F)   Resp 12   Ht 1.549 m (5' 1\")   Wt 68 kg (150 lb)   LMP 09/27/2020 (Within Days)   BMI 28.34 kg/m²   Physical Exam:   Constitutional: Alert, no distress, well-groomed.  Skin: No rashes in visible areas.  Eye: Round. Conjunctiva clear, lids normal. No icterus.   ENMT: Lips pink without lesions, good dentition, moist mucous membranes. Phonation normal.  Neck: No masses, no thyromegaly. Moves freely without pain.  Respiratory: Unlabored respiratory effort, no cough or audible wheeze  Psych: Alert and oriented x3, normal affect and mood. "   Hospital Outpatient Visit on 09/26/2020   Component Date Value   • Bhcg 09/26/2020 <1.0       No results found for: HBA1C  Lab Results   Component Value Date/Time    SODIUM 136 04/27/2018 10:57 AM    POTASSIUM 3.9 04/27/2018 10:57 AM    CHLORIDE 103 04/27/2018 10:57 AM    CO2 25 04/27/2018 10:57 AM    GLUCOSE 86 04/27/2018 10:57 AM    BUN 11 04/27/2018 10:57 AM    CREATININE 0.74 04/27/2018 10:57 AM    ALKPHOSPHAT 63 04/27/2018 10:57 AM    ASTSGOT 17 04/27/2018 10:57 AM    ALTSGPT 18 04/27/2018 10:57 AM    TBILIRUBIN 0.8 04/27/2018 10:57 AM     No results found for: INR  Lab Results   Component Value Date/Time    CHOLSTRLTOT 228 (H) 04/27/2018 10:57 AM     (H) 04/27/2018 10:57 AM    HDL 61 04/27/2018 10:57 AM    TRIGLYCERIDE 75 04/27/2018 10:57 AM       No results found for: TESTOSTERONE  No results found for: TSH  Lab Results   Component Value Date/Time    FREET4 0.98 09/09/2013 11:26 AM     No results found for: URICACID  No components found for: VITB12  Lab Results   Component Value Date/Time    25HYDROXY 22 (L) 11/29/2016 12:13 PM         Assessment and Plan:   The following treatment plan was discussed:     1. Attention deficit hyperactivity disorder (ADHD), predominantly inattentive type-3-month supply sent to pharmacy.  - amphetamine-dextroamphetamine (ADDERALL XR, 20MG,) 20 MG per XR capsule; Take 1 Cap by mouth every morning for 90 days.  Dispense: 90 Cap; Refill: 0  - amphetamine-dextroamphetamine (ADDERALL) 10 MG Tab; Take 1 Tab by mouth every day for 90 days.  Dispense: 90 Tab; Refill: 0      Patient will need repeat follow-up appointment for Adderall refill in 3 months.    2. Anxiety  Valium as needed.  Not ordered today however.  3. Migraine with aura and without status migrainosus, not intractable  - butalbital/APAP/caffeine -40 mg (FIORICET) -40 MG Tab    4. Mixed hyperlipidemia  Under good control. Continue same regimen.    5. Vitamin D deficiency diseaseUnder good control.  Continue same regimen.    6. Tension headache  Under good control. Continue same regimen.  Fioricet  7. Eyelid disorder  - NON SPECIFIED; Latisse solution (generic ok): place 1 drop on each eye daily; apply to eyelid line for eyelid growth  Dispense: 1 Each; Refill: 11  - NON SPECIFIED; Latisse solution (generic ok): place 1 drop on each eye daily; apply to eyelid line for eyelid growth  Dispense: 1 Each; Refill: 11    8. Acquired hypothyroidism  - levothyroxine (SYNTHROID) 25 MCG Tab; Take 1 Tab by mouth Every morning on an empty stomach.  Dispense: 90 Tab; Refill: 3          .    Follow-up: No follow-ups on file.

## 2021-02-11 ENCOUNTER — PATIENT MESSAGE (OUTPATIENT)
Dept: MEDICAL GROUP | Age: 41
End: 2021-02-11

## 2021-02-11 DIAGNOSIS — M25.551 CHRONIC RIGHT HIP PAIN: ICD-10-CM

## 2021-02-11 DIAGNOSIS — F90.0 ATTENTION DEFICIT HYPERACTIVITY DISORDER (ADHD), PREDOMINANTLY INATTENTIVE TYPE: ICD-10-CM

## 2021-02-11 DIAGNOSIS — G89.29 CHRONIC RIGHT HIP PAIN: ICD-10-CM

## 2021-02-11 RX ORDER — DEXTROAMPHETAMINE SACCHARATE, AMPHETAMINE ASPARTATE, DEXTROAMPHETAMINE SULFATE AND AMPHETAMINE SULFATE 2.5; 2.5; 2.5; 2.5 MG/1; MG/1; MG/1; MG/1
10 TABLET ORAL DAILY
Qty: 90 TABLET | Refills: 0 | Status: SHIPPED | OUTPATIENT
Start: 2021-02-11 | End: 2021-08-06 | Stop reason: SDUPTHER

## 2021-02-11 RX ORDER — DEXTROAMPHETAMINE SACCHARATE, AMPHETAMINE ASPARTATE MONOHYDRATE, DEXTROAMPHETAMINE SULFATE AND AMPHETAMINE SULFATE 5; 5; 5; 5 MG/1; MG/1; MG/1; MG/1
20 CAPSULE, EXTENDED RELEASE ORAL EVERY MORNING
Qty: 90 CAPSULE | Refills: 0 | Status: SHIPPED | OUTPATIENT
Start: 2021-02-11 | End: 2021-06-24 | Stop reason: SDUPTHER

## 2021-02-18 ENCOUNTER — HOSPITAL ENCOUNTER (OUTPATIENT)
Dept: RADIOLOGY | Facility: MEDICAL CENTER | Age: 41
End: 2021-02-18
Attending: INTERNAL MEDICINE
Payer: COMMERCIAL

## 2021-02-18 DIAGNOSIS — G89.29 CHRONIC RIGHT HIP PAIN: ICD-10-CM

## 2021-02-18 DIAGNOSIS — M25.551 CHRONIC RIGHT HIP PAIN: ICD-10-CM

## 2021-02-18 PROCEDURE — 73502 X-RAY EXAM HIP UNI 2-3 VIEWS: CPT | Mod: RT

## 2021-03-25 DIAGNOSIS — F41.9 ANXIETY: ICD-10-CM

## 2021-03-25 RX ORDER — DIAZEPAM 2 MG/1
TABLET ORAL
Qty: 90 TABLET | Refills: 1 | Status: SHIPPED | OUTPATIENT
Start: 2021-03-25 | End: 2021-06-24 | Stop reason: SDUPTHER

## 2021-04-16 ENCOUNTER — HOSPITAL ENCOUNTER (OUTPATIENT)
Dept: RADIOLOGY | Facility: MEDICAL CENTER | Age: 41
End: 2021-04-16
Attending: PHYSICIAN ASSISTANT
Payer: COMMERCIAL

## 2021-04-16 DIAGNOSIS — M24.151 ARTICULAR CARTILAGE DISORDER OF HIP, RIGHT: ICD-10-CM

## 2021-04-16 DIAGNOSIS — M25.551 RIGHT HIP PAIN: ICD-10-CM

## 2021-04-16 DIAGNOSIS — M24.151 OTHER ARTICULAR CARTILAGE DISORDERS, RIGHT HIP: ICD-10-CM

## 2021-04-16 PROCEDURE — 73722 MRI JOINT OF LWR EXTR W/DYE: CPT | Mod: RT

## 2021-04-16 PROCEDURE — 700117 HCHG RX CONTRAST REV CODE 255: Performed by: PHYSICIAN ASSISTANT

## 2021-04-16 PROCEDURE — 700101 HCHG RX REV CODE 250: Performed by: PHYSICIAN ASSISTANT

## 2021-04-16 PROCEDURE — A9576 INJ PROHANCE MULTIPACK: HCPCS | Performed by: PHYSICIAN ASSISTANT

## 2021-04-16 PROCEDURE — 700111 HCHG RX REV CODE 636 W/ 250 OVERRIDE (IP): Performed by: PHYSICIAN ASSISTANT

## 2021-04-16 PROCEDURE — 27093 INJECTION FOR HIP X-RAY: CPT | Mod: RT

## 2021-04-16 RX ORDER — TRIAMCINOLONE ACETONIDE 40 MG/ML
40 INJECTION, SUSPENSION INTRA-ARTICULAR; INTRAMUSCULAR ONCE
Status: COMPLETED | OUTPATIENT
Start: 2021-04-16 | End: 2021-04-16

## 2021-04-16 RX ADMIN — LIDOCAINE HYDROCHLORIDE 6 ML: 10 INJECTION, SOLUTION INFILTRATION; PERINEURAL at 14:30

## 2021-04-16 RX ADMIN — GADOTERIDOL 0.1 ML: 279.3 INJECTION, SOLUTION INTRAVENOUS at 14:30

## 2021-04-16 RX ADMIN — TRIAMCINOLONE ACETONIDE 1 MG: 40 INJECTION, SUSPENSION INTRA-ARTICULAR; INTRAMUSCULAR at 14:30

## 2021-04-16 RX ADMIN — IOHEXOL 5 ML: 300 INJECTION, SOLUTION INTRAVENOUS at 14:30

## 2021-05-21 ENCOUNTER — APPOINTMENT (RX ONLY)
Dept: URBAN - METROPOLITAN AREA CLINIC 20 | Facility: CLINIC | Age: 41
Setting detail: DERMATOLOGY
End: 2021-05-21

## 2021-05-21 DIAGNOSIS — L82.1 OTHER SEBORRHEIC KERATOSIS: ICD-10-CM

## 2021-05-21 DIAGNOSIS — D18.0 HEMANGIOMA: ICD-10-CM

## 2021-05-21 DIAGNOSIS — Z71.89 OTHER SPECIFIED COUNSELING: ICD-10-CM

## 2021-05-21 DIAGNOSIS — L91.0 HYPERTROPHIC SCAR: ICD-10-CM

## 2021-05-21 DIAGNOSIS — D22 MELANOCYTIC NEVI: ICD-10-CM

## 2021-05-21 DIAGNOSIS — L81.4 OTHER MELANIN HYPERPIGMENTATION: ICD-10-CM

## 2021-05-21 PROBLEM — D18.01 HEMANGIOMA OF SKIN AND SUBCUTANEOUS TISSUE: Status: ACTIVE | Noted: 2021-05-21

## 2021-05-21 PROBLEM — D22.72 MELANOCYTIC NEVI OF LEFT LOWER LIMB, INCLUDING HIP: Status: ACTIVE | Noted: 2021-05-21

## 2021-05-21 PROBLEM — D22.5 MELANOCYTIC NEVI OF TRUNK: Status: ACTIVE | Noted: 2021-05-21

## 2021-05-21 PROBLEM — D22.62 MELANOCYTIC NEVI OF LEFT UPPER LIMB, INCLUDING SHOULDER: Status: ACTIVE | Noted: 2021-05-21

## 2021-05-21 PROBLEM — D22.71 MELANOCYTIC NEVI OF RIGHT LOWER LIMB, INCLUDING HIP: Status: ACTIVE | Noted: 2021-05-21

## 2021-05-21 PROBLEM — D22.61 MELANOCYTIC NEVI OF RIGHT UPPER LIMB, INCLUDING SHOULDER: Status: ACTIVE | Noted: 2021-05-21

## 2021-05-21 PROBLEM — D22.39 MELANOCYTIC NEVI OF OTHER PARTS OF FACE: Status: ACTIVE | Noted: 2021-05-21

## 2021-05-21 PROCEDURE — ? COUNSELING

## 2021-05-21 PROCEDURE — 11900 INJECT SKIN LESIONS </W 7: CPT

## 2021-05-21 PROCEDURE — ? ADDITIONAL NOTES

## 2021-05-21 PROCEDURE — ? INTRALESIONAL KENALOG

## 2021-05-21 PROCEDURE — ? SUNSCREEN RECOMMENDATIONS

## 2021-05-21 PROCEDURE — 99213 OFFICE O/P EST LOW 20 MIN: CPT | Mod: 25

## 2021-05-21 ASSESSMENT — LOCATION DETAILED DESCRIPTION DERM
LOCATION DETAILED: RIGHT DISTAL POSTERIOR THIGH
LOCATION DETAILED: RIGHT INFERIOR MEDIAL MIDBACK
LOCATION DETAILED: LEFT LATERAL PROXIMAL PRETIBIAL REGION
LOCATION DETAILED: RIGHT INFERIOR FOREHEAD
LOCATION DETAILED: LEFT DISTAL POSTERIOR THIGH
LOCATION DETAILED: RIGHT DISTAL DORSAL FOREARM
LOCATION DETAILED: RIGHT PROXIMAL PRETIBIAL REGION
LOCATION DETAILED: LEFT PROXIMAL POSTERIOR UPPER ARM
LOCATION DETAILED: RIGHT SUPERIOR UPPER BACK
LOCATION DETAILED: RIGHT INFERIOR CENTRAL MALAR CHEEK
LOCATION DETAILED: RIGHT VENTRAL PROXIMAL FOREARM
LOCATION DETAILED: INFERIOR THORACIC SPINE
LOCATION DETAILED: RIGHT DISTAL POSTERIOR UPPER ARM
LOCATION DETAILED: RIGHT INFERIOR MEDIAL UPPER BACK
LOCATION DETAILED: LEFT VENTRAL PROXIMAL FOREARM

## 2021-05-21 ASSESSMENT — LOCATION SIMPLE DESCRIPTION DERM
LOCATION SIMPLE: RIGHT LOWER BACK
LOCATION SIMPLE: RIGHT POSTERIOR UPPER ARM
LOCATION SIMPLE: RIGHT PRETIBIAL REGION
LOCATION SIMPLE: RIGHT FOREHEAD
LOCATION SIMPLE: UPPER BACK
LOCATION SIMPLE: RIGHT POSTERIOR THIGH
LOCATION SIMPLE: RIGHT FOREARM
LOCATION SIMPLE: LEFT PRETIBIAL REGION
LOCATION SIMPLE: LEFT FOREARM
LOCATION SIMPLE: RIGHT CHEEK
LOCATION SIMPLE: LEFT POSTERIOR THIGH
LOCATION SIMPLE: RIGHT UPPER BACK
LOCATION SIMPLE: LEFT POSTERIOR UPPER ARM

## 2021-05-21 ASSESSMENT — LOCATION ZONE DERM
LOCATION ZONE: LEG
LOCATION ZONE: FACE
LOCATION ZONE: ARM
LOCATION ZONE: TRUNK

## 2021-05-21 NOTE — PROCEDURE: ADDITIONAL NOTES
Detail Level: Detailed
Render Risk Assessment In Note?: no
Additional Notes: Pt will contact Shasha re: Laser treatment

## 2021-05-21 NOTE — PROCEDURE: INTRALESIONAL KENALOG
Lot # For Kenalog (Optional): [id number]
Administered By (Optional): Mayra WATERMAN
Consent: The risks of atrophy, bleeding, scarring and infection were reviewed with the patient. Re-treatment may be necessary.
Expiration Date For Kenalog (Optional): 06/2022
Total Volume (Ccs): .1
Treatment Number (Optional): 1
Detail Level: Detailed
Kenalog Preparation: Kenalog
Include Z78.9 (Other Specified Conditions Influencing Health Status) As An Associated Diagnosis?: Yes
X Size Of Lesion In Cm (Optional): 0
Medical Necessity Clause: This procedure was medically necessary because the lesions that were treated were:
Concentration Of Kenalog Solution Injected (Mg/Ml): 40.0

## 2021-06-03 DIAGNOSIS — F41.9 ANXIETY: ICD-10-CM

## 2021-06-03 DIAGNOSIS — M62.838 MUSCLE SPASM: ICD-10-CM

## 2021-06-03 DIAGNOSIS — E03.9 ACQUIRED HYPOTHYROIDISM: ICD-10-CM

## 2021-06-03 RX ORDER — DIAZEPAM 2 MG/1
TABLET ORAL
Status: CANCELLED | OUTPATIENT
Start: 2021-06-03 | End: 2021-09-01

## 2021-06-04 RX ORDER — CYCLOBENZAPRINE HCL 10 MG
10 TABLET ORAL 3 TIMES DAILY PRN
Qty: 90 TABLET | Refills: 1 | Status: SHIPPED | OUTPATIENT
Start: 2021-06-04 | End: 2021-11-26 | Stop reason: SDUPTHER

## 2021-06-04 RX ORDER — LEVOTHYROXINE SODIUM 0.03 MG/1
25 TABLET ORAL
Qty: 90 TABLET | Refills: 3 | Status: SHIPPED | OUTPATIENT
Start: 2021-06-04 | End: 2021-11-26 | Stop reason: SDUPTHER

## 2021-06-24 ENCOUNTER — TELEMEDICINE (OUTPATIENT)
Dept: MEDICAL GROUP | Age: 41
End: 2021-06-24
Payer: COMMERCIAL

## 2021-06-24 VITALS
TEMPERATURE: 98.6 F | RESPIRATION RATE: 12 BRPM | WEIGHT: 148 LBS | HEIGHT: 61 IN | HEART RATE: 80 BPM | BODY MASS INDEX: 27.94 KG/M2

## 2021-06-24 DIAGNOSIS — E03.9 ACQUIRED HYPOTHYROIDISM: ICD-10-CM

## 2021-06-24 DIAGNOSIS — E78.2 MIXED HYPERLIPIDEMIA: ICD-10-CM

## 2021-06-24 DIAGNOSIS — M25.551 CHRONIC RIGHT HIP PAIN: ICD-10-CM

## 2021-06-24 DIAGNOSIS — G89.29 CHRONIC RIGHT HIP PAIN: ICD-10-CM

## 2021-06-24 DIAGNOSIS — E53.8 B12 DEFICIENCY: ICD-10-CM

## 2021-06-24 DIAGNOSIS — F41.9 ANXIETY: ICD-10-CM

## 2021-06-24 DIAGNOSIS — E55.9 VITAMIN D DEFICIENCY: ICD-10-CM

## 2021-06-24 DIAGNOSIS — G43.109 MIGRAINE WITH AURA AND WITHOUT STATUS MIGRAINOSUS, NOT INTRACTABLE: ICD-10-CM

## 2021-06-24 DIAGNOSIS — F90.0 ATTENTION DEFICIT HYPERACTIVITY DISORDER (ADHD), PREDOMINANTLY INATTENTIVE TYPE: ICD-10-CM

## 2021-06-24 PROCEDURE — 99214 OFFICE O/P EST MOD 30 MIN: CPT | Mod: 95,CR | Performed by: INTERNAL MEDICINE

## 2021-06-24 RX ORDER — DIAZEPAM 2 MG/1
2 TABLET ORAL
Qty: 90 TABLET | Refills: 1 | Status: SHIPPED | OUTPATIENT
Start: 2021-06-24 | End: 2022-03-31 | Stop reason: SDUPTHER

## 2021-06-24 RX ORDER — DEXTROAMPHETAMINE SACCHARATE, AMPHETAMINE ASPARTATE MONOHYDRATE, DEXTROAMPHETAMINE SULFATE AND AMPHETAMINE SULFATE 5; 5; 5; 5 MG/1; MG/1; MG/1; MG/1
20 CAPSULE, EXTENDED RELEASE ORAL EVERY MORNING
Qty: 90 CAPSULE | Refills: 0 | Status: SHIPPED | OUTPATIENT
Start: 2021-09-24 | End: 2021-11-26 | Stop reason: SDUPTHER

## 2021-06-24 RX ORDER — DEXTROAMPHETAMINE SACCHARATE, AMPHETAMINE ASPARTATE MONOHYDRATE, DEXTROAMPHETAMINE SULFATE AND AMPHETAMINE SULFATE 5; 5; 5; 5 MG/1; MG/1; MG/1; MG/1
20 CAPSULE, EXTENDED RELEASE ORAL EVERY MORNING
Qty: 90 CAPSULE | Refills: 0 | Status: SHIPPED | OUTPATIENT
Start: 2021-06-24 | End: 2021-09-22

## 2021-06-24 ASSESSMENT — PATIENT HEALTH QUESTIONNAIRE - PHQ9: CLINICAL INTERPRETATION OF PHQ2 SCORE: 0

## 2021-06-24 NOTE — PROGRESS NOTES
Virtual Visit: Established Patient   This visit was conducted via Zoom using secure and encrypted videoconferencing technology. The patient was in a private location in the state of [place].    The patient's identity was confirmed and verbal consent was obtained for this virtual visit.    Subjective:   CC:   Chief Complaint   Patient presents with   • ADHD     medication refill        Sara Taylor is a 40 y.o. female presenting for evaluation and management of:         ROS    Denies any recent fevers or chills. No nausea or vomiting. No chest pains or shortness of breath.     No Known Allergies    Current medicines (including changes today)  Current Outpatient Medications   Medication Sig Dispense Refill   • diclofenac sodium 1 % Gel Place 2 g on the skin 3 times a day for 30 days. To affected joint 100 g 4   • diazePAM (VALIUM) 2 MG Tab Take 1 tablet by mouth 1 time a day as needed for up to 90 days. 90 tablet 1   • amphetamine-dextroamphetamine (ADDERALL XR, 20MG,) 20 MG per XR capsule Take 1 capsule by mouth every morning for 90 days. 90 capsule 0   • [START ON 9/24/2021] amphetamine-dextroamphetamine (ADDERALL XR, 20MG,) 20 MG per XR capsule Take 1 capsule by mouth every morning for 90 days. 90 capsule 0   • cyclobenzaprine (FLEXERIL) 10 mg Tab Take 1 tablet by mouth 3 times a day as needed. 90 tablet 1   • levothyroxine (SYNTHROID) 25 MCG Tab Take 1 tablet by mouth every morning on an empty stomach. 90 tablet 3   • butalbital/APAP/caffeine -40 mg (FIORICET) -40 MG Tab      • NON SPECIFIED Latisse solution (generic ok): place 1 drop on each eye daily; apply to eyelid line for eyelid growth 1 Each 11   • Cholecalciferol (VITAMIN D3) 5000 units Tab Take  by mouth. 30 Tab    • ferrous sulfate 325 (65 FE) MG tablet Take 1 Tab by mouth every morning with breakfast. 30 Tab 3     No current facility-administered medications for this visit.       Patient Active Problem List    Diagnosis Date Noted   •  "Acquired hypothyroidism 10/15/2020   • B12 deficiency 10/15/2020   • Eyelid disorder 10/15/2020   • Anxiety 03/29/2018   • Tension headache 03/29/2018   • Attention deficit hyperactivity disorder (ADHD), predominantly inattentive type 03/29/2018   • Multiple allergies 03/29/2018   • Vitamin D deficiency disease 08/15/2016   • Migraine with aura and without status migrainosus, not intractable 06/09/2015   • Mixed hyperlipidemia 10/07/2013       Family History   Problem Relation Age of Onset   • Thyroid Mother    • Heart Disease Father    • Cancer Father 65        colon       She  has a past medical history of ADD (attention deficit disorder with hyperactivity).  She  has a past surgical history that includes mammoplasty augmentation (9/1/2010) and liposuction (9/1/2010).       Objective:   Ht 1.549 m (5' 1\")   Wt 67.1 kg (148 lb)   LMP 06/14/2021 (Within Days)   BMI 27.96 kg/m²     Physical Exam:       Constitutional: Alert, no distress, well-groomed.  Skin: No rashes in visible areas.  Eye: Round. Conjunctiva clear, lids normal. No icterus.   ENMT: Lips pink without lesions, good dentition, moist mucous membranes. Phonation normal.  Neck: No masses, no thyromegaly. Moves freely without pain.  Respiratory: Unlabored respiratory effort, no cough or audible wheeze  Psych: Alert and oriented x3, normal affect and mood.       Assessment and Plan:   The following treatment plan was discussed:     1. Attention deficit hyperactivity disorder (ADHD), predominantly inattentive type-good control. Continue current regimen. Recheck every 6 months for Adderall refill.  - amphetamine-dextroamphetamine (ADDERALL XR, 20MG,) 20 MG per XR capsule; Take 1 capsule by mouth every morning for 90 days.  Dispense: 90 capsule; Refill: 0  - amphetamine-dextroamphetamine (ADDERALL XR, 20MG,) 20 MG per XR capsule; Take 1 capsule by mouth every morning for 90 days.  Dispense: 90 capsule; Refill: 0    2. Anxiety good control on Valium as " needed. Recheck every 6 months to refill.  - diazePAM (VALIUM) 2 MG Tab; Take 1 tablet by mouth 1 time a day as needed for up to 90 days.  Dispense: 90 tablet; Refill: 1    3. Acquired hypothyroidism-good control on current replacement therapy. Can refill of thyroid done.  - TSH; Future    4. Mixed hyperlipidemia-good control on diet and exercise. Continue unchanged. Recheck lipid panel  - Comp Metabolic Panel; Future  - Lipid Profile; Future  - CBC WITH DIFFERENTIAL; Future    5. Vitamin D deficiency disease-continue vitamin D3 supplements. Check level.  - VITAMIN D,25 HYDROXY; Future    6. Migraine with aura and without status migrainosus, not intractable   Good control continue same regimen. 7. B12 deficiency  -Good control continue B12 supplementation  8. Chronic right hip pain-this is not well controlled. She was advised to get total hip replacement but plain films of the hips did not show severe DJD although MRI does show significant cartilaginous thinning and her orthopedic surgeon suggested hip replacement when ready mentally. Patient like second opinion. Placed. Trial of diclofenac topically in the meantime.  - REFERRAL TO ORTHOPEDICS  - REFERRAL TO PHYSICAL THERAPY  - diclofenac sodium 1 % Gel; Place 2 g on the skin 3 times a day for 30 days. To affected joint  Dispense: 100 g; Refill: 4        Follow-up: No follow-ups on file.

## 2021-08-03 ENCOUNTER — TELEPHONE (OUTPATIENT)
Dept: MEDICAL GROUP | Age: 41
End: 2021-08-03

## 2021-08-03 DIAGNOSIS — G43.109 MIGRAINE WITH AURA AND WITHOUT STATUS MIGRAINOSUS, NOT INTRACTABLE: ICD-10-CM

## 2021-08-03 RX ORDER — BUTALBITAL, ACETAMINOPHEN AND CAFFEINE 50; 325; 40 MG/1; MG/1; MG/1
1 TABLET ORAL EVERY 4 HOURS PRN
Qty: 540 TABLET | Refills: 0 | Status: SHIPPED | OUTPATIENT
Start: 2021-08-03 | End: 2021-11-01

## 2021-08-03 NOTE — TELEPHONE ENCOUNTER
Phone Number Called: [phone] (home)       Call outcome: Did not leave a detailed message. Requested patient to call back.    Message: 1st attempt     Pt lvm wanting her Adderall refilled but she should still have enough

## 2021-08-04 NOTE — TELEPHONE ENCOUNTER
Phone Number Called: [phone] (home)       Call outcome: Did not leave a detailed message. Requested patient to call back.    Message: 2nd attempt

## 2021-08-06 DIAGNOSIS — F90.0 ATTENTION DEFICIT HYPERACTIVITY DISORDER (ADHD), PREDOMINANTLY INATTENTIVE TYPE: ICD-10-CM

## 2021-08-06 RX ORDER — DEXTROAMPHETAMINE SACCHARATE, AMPHETAMINE ASPARTATE, DEXTROAMPHETAMINE SULFATE AND AMPHETAMINE SULFATE 2.5; 2.5; 2.5; 2.5 MG/1; MG/1; MG/1; MG/1
10 TABLET ORAL DAILY
Qty: 90 TABLET | Refills: 0 | Status: SHIPPED | OUTPATIENT
Start: 2021-08-06 | End: 2021-11-04

## 2021-08-06 NOTE — TELEPHONE ENCOUNTER
Phone Number Called: [phone] (home)     Call outcome: Spoke to patient regarding message below.    Message: Patient did  her adderall 20 MG In June but patient stated that she also gets a prescription for adderall 10 MG. Sent a refill request to Dr. Esqueda for 10 MG script and advised patient that Dr. Esqueda is out of the office on fridays but that I have sent him a request to sign for Monday Pt. Understood.

## 2021-11-26 DIAGNOSIS — E03.9 ACQUIRED HYPOTHYROIDISM: ICD-10-CM

## 2021-11-26 DIAGNOSIS — F90.0 ATTENTION DEFICIT HYPERACTIVITY DISORDER (ADHD), PREDOMINANTLY INATTENTIVE TYPE: ICD-10-CM

## 2021-11-26 DIAGNOSIS — M62.838 MUSCLE SPASM: ICD-10-CM

## 2021-12-03 RX ORDER — LEVOTHYROXINE SODIUM 0.03 MG/1
25 TABLET ORAL
Qty: 90 TABLET | Refills: 3 | Status: SHIPPED | OUTPATIENT
Start: 2021-12-03 | End: 2022-10-29 | Stop reason: SDUPTHER

## 2021-12-03 RX ORDER — DEXTROAMPHETAMINE SACCHARATE, AMPHETAMINE ASPARTATE MONOHYDRATE, DEXTROAMPHETAMINE SULFATE AND AMPHETAMINE SULFATE 5; 5; 5; 5 MG/1; MG/1; MG/1; MG/1
20 CAPSULE, EXTENDED RELEASE ORAL EVERY MORNING
Qty: 90 CAPSULE | Refills: 0 | Status: SHIPPED | OUTPATIENT
Start: 2021-12-03 | End: 2022-03-31 | Stop reason: SDUPTHER

## 2021-12-03 RX ORDER — CYCLOBENZAPRINE HCL 10 MG
10 TABLET ORAL 3 TIMES DAILY PRN
Qty: 90 TABLET | Refills: 1 | Status: SHIPPED | OUTPATIENT
Start: 2021-12-03 | End: 2022-10-29 | Stop reason: SDUPTHER

## 2022-03-12 ENCOUNTER — HOSPITAL ENCOUNTER (OUTPATIENT)
Dept: LAB | Facility: MEDICAL CENTER | Age: 42
End: 2022-03-12
Attending: INTERNAL MEDICINE
Payer: COMMERCIAL

## 2022-03-12 DIAGNOSIS — E78.2 MIXED HYPERLIPIDEMIA: ICD-10-CM

## 2022-03-12 DIAGNOSIS — E03.9 ACQUIRED HYPOTHYROIDISM: ICD-10-CM

## 2022-03-12 DIAGNOSIS — E55.9 VITAMIN D DEFICIENCY: ICD-10-CM

## 2022-03-12 LAB
25(OH)D3 SERPL-MCNC: 22 NG/ML (ref 30–100)
ALBUMIN SERPL BCP-MCNC: 4.6 G/DL (ref 3.2–4.9)
ALBUMIN/GLOB SERPL: 1.6 G/DL
ALP SERPL-CCNC: 81 U/L (ref 30–99)
ALT SERPL-CCNC: 14 U/L (ref 2–50)
ANION GAP SERPL CALC-SCNC: 11 MMOL/L (ref 7–16)
AST SERPL-CCNC: 15 U/L (ref 12–45)
BASOPHILS # BLD AUTO: 0.8 % (ref 0–1.8)
BASOPHILS # BLD: 0.05 K/UL (ref 0–0.12)
BILIRUB SERPL-MCNC: 0.4 MG/DL (ref 0.1–1.5)
BUN SERPL-MCNC: 6 MG/DL (ref 8–22)
CALCIUM SERPL-MCNC: 9.4 MG/DL (ref 8.5–10.5)
CHLORIDE SERPL-SCNC: 104 MMOL/L (ref 96–112)
CHOLEST SERPL-MCNC: 208 MG/DL (ref 100–199)
CO2 SERPL-SCNC: 26 MMOL/L (ref 20–33)
CREAT SERPL-MCNC: 0.87 MG/DL (ref 0.5–1.4)
EOSINOPHIL # BLD AUTO: 0.15 K/UL (ref 0–0.51)
EOSINOPHIL NFR BLD: 2.3 % (ref 0–6.9)
ERYTHROCYTE [DISTWIDTH] IN BLOOD BY AUTOMATED COUNT: 41.7 FL (ref 35.9–50)
GLOBULIN SER CALC-MCNC: 2.8 G/DL (ref 1.9–3.5)
GLUCOSE SERPL-MCNC: 80 MG/DL (ref 65–99)
HCT VFR BLD AUTO: 45.4 % (ref 37–47)
HDLC SERPL-MCNC: 61 MG/DL
HGB BLD-MCNC: 15.3 G/DL (ref 12–16)
IMM GRANULOCYTES # BLD AUTO: 0.03 K/UL (ref 0–0.11)
IMM GRANULOCYTES NFR BLD AUTO: 0.5 % (ref 0–0.9)
LDLC SERPL CALC-MCNC: 115 MG/DL
LYMPHOCYTES # BLD AUTO: 2.55 K/UL (ref 1–4.8)
LYMPHOCYTES NFR BLD: 39.1 % (ref 22–41)
MCH RBC QN AUTO: 31.6 PG (ref 27–33)
MCHC RBC AUTO-ENTMCNC: 33.7 G/DL (ref 33.6–35)
MCV RBC AUTO: 93.8 FL (ref 81.4–97.8)
MONOCYTES # BLD AUTO: 0.37 K/UL (ref 0–0.85)
MONOCYTES NFR BLD AUTO: 5.7 % (ref 0–13.4)
NEUTROPHILS # BLD AUTO: 3.38 K/UL (ref 2–7.15)
NEUTROPHILS NFR BLD: 51.6 % (ref 44–72)
NRBC # BLD AUTO: 0 K/UL
NRBC BLD-RTO: 0 /100 WBC
PLATELET # BLD AUTO: 213 K/UL (ref 164–446)
PMV BLD AUTO: 10.5 FL (ref 9–12.9)
POTASSIUM SERPL-SCNC: 4.5 MMOL/L (ref 3.6–5.5)
PROT SERPL-MCNC: 7.4 G/DL (ref 6–8.2)
RBC # BLD AUTO: 4.84 M/UL (ref 4.2–5.4)
SODIUM SERPL-SCNC: 141 MMOL/L (ref 135–145)
TRIGL SERPL-MCNC: 159 MG/DL (ref 0–149)
TSH SERPL DL<=0.005 MIU/L-ACNC: 1.22 UIU/ML (ref 0.38–5.33)
WBC # BLD AUTO: 6.5 K/UL (ref 4.8–10.8)

## 2022-03-12 PROCEDURE — 80053 COMPREHEN METABOLIC PANEL: CPT

## 2022-03-12 PROCEDURE — 82306 VITAMIN D 25 HYDROXY: CPT

## 2022-03-12 PROCEDURE — 85025 COMPLETE CBC W/AUTO DIFF WBC: CPT

## 2022-03-12 PROCEDURE — 84443 ASSAY THYROID STIM HORMONE: CPT

## 2022-03-12 PROCEDURE — 36415 COLL VENOUS BLD VENIPUNCTURE: CPT

## 2022-03-12 PROCEDURE — 80061 LIPID PANEL: CPT

## 2022-03-13 ENCOUNTER — PATIENT MESSAGE (OUTPATIENT)
Dept: MEDICAL GROUP | Age: 42
End: 2022-03-13
Payer: COMMERCIAL

## 2022-03-13 DIAGNOSIS — E03.9 ACQUIRED HYPOTHYROIDISM: ICD-10-CM

## 2022-03-14 DIAGNOSIS — E07.9 THYROID DISORDER: ICD-10-CM

## 2022-03-14 DIAGNOSIS — K31.9 STOMACH PROBLEMS: ICD-10-CM

## 2022-03-14 NOTE — TELEPHONE ENCOUNTER
From: Sara Taylor  To: Physician Leighton Esqueda  Sent: 3/13/2022 3:43 PM PDT  Subject: Hormone levels    I wanted to know if I can have a referral to a endocrinologist and a gastrointestinal doc, I need to have my hormones and thyroid tested again it has been over two years. I am still having stomach issues as I always have had.

## 2022-03-31 ENCOUNTER — TELEMEDICINE (OUTPATIENT)
Dept: MEDICAL GROUP | Age: 42
End: 2022-03-31
Payer: COMMERCIAL

## 2022-03-31 VITALS — HEIGHT: 61 IN | WEIGHT: 155 LBS | BODY MASS INDEX: 29.27 KG/M2

## 2022-03-31 DIAGNOSIS — G43.109 MIGRAINE WITH AURA AND WITHOUT STATUS MIGRAINOSUS, NOT INTRACTABLE: ICD-10-CM

## 2022-03-31 DIAGNOSIS — E34.8 ESTRADIOL DEFICIENCY: ICD-10-CM

## 2022-03-31 DIAGNOSIS — E78.2 MIXED HYPERLIPIDEMIA: ICD-10-CM

## 2022-03-31 DIAGNOSIS — F41.9 ANXIETY: ICD-10-CM

## 2022-03-31 DIAGNOSIS — R21 RASH: ICD-10-CM

## 2022-03-31 DIAGNOSIS — E53.8 B12 DEFICIENCY: ICD-10-CM

## 2022-03-31 DIAGNOSIS — F90.0 ATTENTION DEFICIT HYPERACTIVITY DISORDER (ADHD), PREDOMINANTLY INATTENTIVE TYPE: ICD-10-CM

## 2022-03-31 DIAGNOSIS — E55.9 VITAMIN D DEFICIENCY: ICD-10-CM

## 2022-03-31 DIAGNOSIS — G44.209 TENSION HEADACHE: ICD-10-CM

## 2022-03-31 DIAGNOSIS — E03.9 ACQUIRED HYPOTHYROIDISM: ICD-10-CM

## 2022-03-31 PROCEDURE — 99215 OFFICE O/P EST HI 40 MIN: CPT | Mod: 95 | Performed by: INTERNAL MEDICINE

## 2022-03-31 RX ORDER — DEXTROAMPHETAMINE SACCHARATE, AMPHETAMINE ASPARTATE MONOHYDRATE, DEXTROAMPHETAMINE SULFATE AND AMPHETAMINE SULFATE 5; 5; 5; 5 MG/1; MG/1; MG/1; MG/1
40 CAPSULE, EXTENDED RELEASE ORAL EVERY MORNING
Qty: 60 CAPSULE | Refills: 0 | Status: SHIPPED | OUTPATIENT
Start: 2022-04-30 | End: 2022-05-30

## 2022-03-31 RX ORDER — MULTIVIT-MIN/IRON/FOLIC ACID/K 18-600-40
2000 CAPSULE ORAL DAILY
Qty: 100 CAPSULE | Refills: 3 | Status: SHIPPED | OUTPATIENT
Start: 2022-03-31 | End: 2023-12-04

## 2022-03-31 RX ORDER — SUMATRIPTAN 50 MG/1
50 TABLET, FILM COATED ORAL
Qty: 10 TABLET | Refills: 3 | Status: SHIPPED | OUTPATIENT
Start: 2022-03-31 | End: 2022-06-13 | Stop reason: SDUPTHER

## 2022-03-31 RX ORDER — CYANOCOBALAMIN 1000 UG/ML
1000 INJECTION, SOLUTION INTRAMUSCULAR; SUBCUTANEOUS
Qty: 10 ML | Refills: 3 | Status: SHIPPED | OUTPATIENT
Start: 2022-03-31 | End: 2022-04-30

## 2022-03-31 RX ORDER — DEXTROAMPHETAMINE SACCHARATE, AMPHETAMINE ASPARTATE MONOHYDRATE, DEXTROAMPHETAMINE SULFATE AND AMPHETAMINE SULFATE 5; 5; 5; 5 MG/1; MG/1; MG/1; MG/1
40 CAPSULE, EXTENDED RELEASE ORAL EVERY MORNING
Qty: 60 CAPSULE | Refills: 0 | Status: SHIPPED | OUTPATIENT
Start: 2022-03-31 | End: 2022-05-11 | Stop reason: SDUPTHER

## 2022-03-31 RX ORDER — HYDROQUINONE 40 MG/G
1 CREAM TOPICAL
Qty: 28.35 G | Refills: 2 | Status: SHIPPED | OUTPATIENT
Start: 2022-03-31 | End: 2022-06-13 | Stop reason: SDUPTHER

## 2022-03-31 RX ORDER — DIAZEPAM 2 MG/1
2 TABLET ORAL
Qty: 30 TABLET | Refills: 2 | Status: SHIPPED | OUTPATIENT
Start: 2022-03-31 | End: 2022-04-30

## 2022-03-31 RX ORDER — DEXTROAMPHETAMINE SACCHARATE, AMPHETAMINE ASPARTATE MONOHYDRATE, DEXTROAMPHETAMINE SULFATE AND AMPHETAMINE SULFATE 5; 5; 5; 5 MG/1; MG/1; MG/1; MG/1
40 CAPSULE, EXTENDED RELEASE ORAL EVERY MORNING
Qty: 60 CAPSULE | Refills: 0 | Status: SHIPPED | OUTPATIENT
Start: 2022-05-30 | End: 2022-06-03

## 2022-03-31 ASSESSMENT — FIBROSIS 4 INDEX: FIB4 SCORE: 0.73

## 2022-03-31 ASSESSMENT — PATIENT HEALTH QUESTIONNAIRE - PHQ9: CLINICAL INTERPRETATION OF PHQ2 SCORE: 0

## 2022-04-01 NOTE — PROGRESS NOTES
Virtual Visit: Established Patient   This visit was conducted via Zoom using secure and encrypted videoconferencing technology.   The patient was in their home in the state of [place].    The patient's identity was confirmed and verbal consent was obtained for this virtual visit.     Subjective:   CC:   Chief Complaint   Patient presents with   • Follow-Up   • Medication Refill     Wants to talk about adha medication and migrain medication        Sara Taylor is a 41 y.o. female presenting for evaluation and management of:  The patient is here for followup of chronic medical problems listed below. The patient is compliant with medications and having no side effects from them. Denies chest pain, abdominal pain, dyspnea, myalgias, or cough.   Patient Active Problem List    Diagnosis Date Noted   • Acquired hypothyroidism 10/15/2020   • B12 deficiency 10/15/2020   • Eyelid disorder 10/15/2020   • Anxiety 03/29/2018   • Tension headache 03/29/2018   • Attention deficit hyperactivity disorder (ADHD), predominantly inattentive type 03/29/2018   • Multiple allergies 03/29/2018   • Vitamin D deficiency disease 08/15/2016   • Migraine with aura and without status migrainosus, not intractable 06/09/2015   • Mixed hyperlipidemia 10/07/2013     No Known Allergies  Outpatient Medications Prior to Visit   Medication Sig Dispense Refill   • cyclobenzaprine (FLEXERIL) 10 mg Tab Take 1 Tablet by mouth 3 times a day as needed. 90 Tablet 1   • levothyroxine (SYNTHROID) 25 MCG Tab Take 1 Tablet by mouth every morning on an empty stomach. 90 Tablet 3   • NON SPECIFIED Latisse solution (generic ok): place 1 drop on each eye daily; apply to eyelid line for eyelid growth 1 Each 11   • Cholecalciferol (VITAMIN D3) 5000 units Tab Take  by mouth. 30 Tab    • ferrous sulfate 325 (65 FE) MG tablet Take 1 Tab by mouth every morning with breakfast. 30 Tab 3     No facility-administered medications prior to visit.     .   No Known  Allergies      ROS         Current medicines (including changes today)  Current Outpatient Medications   Medication Sig Dispense Refill   • Cholecalciferol (VITAMIN D) 50 MCG (2000 UT) Cap Take 1 Capsule by mouth every day. 100 Capsule 3   • amphetamine-dextroamphetamine (ADDERALL XR, 20MG,) 20 MG per XR capsule Take 2 Capsules by mouth every morning for 30 days. 60 Capsule 0   • [START ON 4/30/2022] amphetamine-dextroamphetamine (ADDERALL XR, 20MG,) 20 MG per XR capsule Take 2 Capsules by mouth every morning for 30 days. 60 Capsule 0   • [START ON 5/30/2022] amphetamine-dextroamphetamine (ADDERALL XR, 20MG,) 20 MG per XR capsule Take 2 Capsules by mouth every morning for 30 days. 60 Capsule 0   • SUMAtriptan (IMITREX) 50 MG Tab Take 1 Tablet by mouth one time as needed for Migraine for up to 1 dose. 10 Tablet 3   • cyanocobalamin (VITAMIN B-12) 1000 MCG/ML Solution Inject 1 mL into the shoulder, thigh, or buttocks every 30 days for 30 days. Include syringes and needles 10 mL 3   • hydroquinone 4 % cream Apply 1 Application topically 1 time a day as needed (rash). 28.35 g 2   • diazePAM (VALIUM) 2 MG Tab Take 1 Tablet by mouth 1 time a day as needed for Anxiety or Sleep for up to 30 days. 30 Tablet 2   • cyclobenzaprine (FLEXERIL) 10 mg Tab Take 1 Tablet by mouth 3 times a day as needed. 90 Tablet 1   • levothyroxine (SYNTHROID) 25 MCG Tab Take 1 Tablet by mouth every morning on an empty stomach. 90 Tablet 3   • NON SPECIFIED Latisse solution (generic ok): place 1 drop on each eye daily; apply to eyelid line for eyelid growth 1 Each 11   • Cholecalciferol (VITAMIN D3) 5000 units Tab Take  by mouth. 30 Tab    • ferrous sulfate 325 (65 FE) MG tablet Take 1 Tab by mouth every morning with breakfast. 30 Tab 3     No current facility-administered medications for this visit.       Patient Active Problem List    Diagnosis Date Noted   • Acquired hypothyroidism 10/15/2020   • B12 deficiency 10/15/2020   • Eyelid disorder  "10/15/2020   • Anxiety 03/29/2018   • Tension headache 03/29/2018   • Attention deficit hyperactivity disorder (ADHD), predominantly inattentive type 03/29/2018   • Multiple allergies 03/29/2018   • Vitamin D deficiency disease 08/15/2016   • Migraine with aura and without status migrainosus, not intractable 06/09/2015   • Mixed hyperlipidemia 10/07/2013        Objective:   Ht 1.549 m (5' 1\")   Wt 70.3 kg (155 lb)   BMI 29.29 kg/m²     Physical Exam:   Constitutional: Alert, no distress, well-groomed.  Skin: No rashes in visible areas.  Eye: Round. Conjunctiva clear, lids normal. No icterus.   ENMT: Lips pink without lesions, good dentition, moist mucous membranes. Phonation normal.  Neck: No masses, no thyromegaly. Moves freely without pain.  Respiratory: Unlabored respiratory effort, no cough or audible wheeze  Psych: Alert and oriented x3, normal affect and mood.   Hospital Outpatient Visit on 03/12/2022   Component Date Value   • WBC 03/12/2022 6.5    • RBC 03/12/2022 4.84    • Hemoglobin 03/12/2022 15.3    • Hematocrit 03/12/2022 45.4    • MCV 03/12/2022 93.8    • MCH 03/12/2022 31.6    • MCHC 03/12/2022 33.7    • RDW 03/12/2022 41.7    • Platelet Count 03/12/2022 213    • MPV 03/12/2022 10.5    • Neutrophils-Polys 03/12/2022 51.60    • Lymphocytes 03/12/2022 39.10    • Monocytes 03/12/2022 5.70    • Eosinophils 03/12/2022 2.30    • Basophils 03/12/2022 0.80    • Immature Granulocytes 03/12/2022 0.50    • Nucleated RBC 03/12/2022 0.00    • Neutrophils (Absolute) 03/12/2022 3.38    • Lymphs (Absolute) 03/12/2022 2.55    • Monos (Absolute) 03/12/2022 0.37    • Eos (Absolute) 03/12/2022 0.15    • Baso (Absolute) 03/12/2022 0.05    • Immature Granulocytes (a* 03/12/2022 0.03    • NRBC (Absolute) 03/12/2022 0.00    • 25-Hydroxy   Vitamin D 25 03/12/2022 22 (A)   • Cholesterol,Tot 03/12/2022 208 (A)   • Triglycerides 03/12/2022 159 (A)   • HDL 03/12/2022 61    • LDL 03/12/2022 115 (A)   • Sodium 03/12/2022 141    • " Potassium 03/12/2022 4.5    • Chloride 03/12/2022 104    • Co2 03/12/2022 26    • Anion Gap 03/12/2022 11.0    • Glucose 03/12/2022 80    • Bun 03/12/2022 6 (A)   • Creatinine 03/12/2022 0.87    • Calcium 03/12/2022 9.4    • AST(SGOT) 03/12/2022 15    • ALT(SGPT) 03/12/2022 14    • Alkaline Phosphatase 03/12/2022 81    • Total Bilirubin 03/12/2022 0.4    • Albumin 03/12/2022 4.6    • Total Protein 03/12/2022 7.4    • Globulin 03/12/2022 2.8    • A-G Ratio 03/12/2022 1.6    • TSH 03/12/2022 1.220    • GFR If  03/12/2022 >60    • GFR If Non  [facility]meri* 03/12/2022 >60       No results found for: HBA1C  Lab Results   Component Value Date/Time    SODIUM 141 03/12/2022 11:34 AM    POTASSIUM 4.5 03/12/2022 11:34 AM    CHLORIDE 104 03/12/2022 11:34 AM    CO2 26 03/12/2022 11:34 AM    GLUCOSE 80 03/12/2022 11:34 AM    BUN 6 (L) 03/12/2022 11:34 AM    CREATININE 0.87 03/12/2022 11:34 AM    ALKPHOSPHAT 81 03/12/2022 11:34 AM    ASTSGOT 15 03/12/2022 11:34 AM    ALTSGPT 14 03/12/2022 11:34 AM    TBILIRUBIN 0.4 03/12/2022 11:34 AM     No results found for: INR  Lab Results   Component Value Date/Time    CHOLSTRLTOT 208 (H) 03/12/2022 11:34 AM     (H) 03/12/2022 11:34 AM    HDL 61 03/12/2022 11:34 AM    TRIGLYCERIDE 159 (H) 03/12/2022 11:34 AM       No results found for: TESTOSTERONE  No results found for: TSH  Lab Results   Component Value Date/Time    FREET4 0.98 09/09/2013 11:26 AM     No results found for: URICACID  No components found for: VITB12  Lab Results   Component Value Date/Time    25HYDROXY 22 (L) 03/12/2022 11:34 AM    25HYDROXY 22 (L) 11/29/2016 12:13 PM   '  Assessment and Plan:   The following treatment plan was discussed:     1. Attention deficit hyperactivity disorder (ADHD), predominantly inattentive type-    Not well controlled.  Increase Adderall to to 20 mg XR, 2 pills daily.  She will let us know how this is working for her and if it seems to be too much she will go back to her  previous schedule of 20 mg XR every morning +10 mg of regular Adderall later in the day as needed.  - amphetamine-dextroamphetamine (ADDERALL XR, 20MG,) 20 MG per XR capsule; Take 2 Capsules by mouth every morning for 30 days.  Dispense: 60 Capsule; Refill: 0  - amphetamine-dextroamphetamine (ADDERALL XR, 20MG,) 20 MG per XR capsule; Take 2 Capsules by mouth every morning for 30 days.  Dispense: 60 Capsule; Refill: 0  - amphetamine-dextroamphetamine (ADDERALL XR, 20MG,) 20 MG per XR capsule; Take 2 Capsules by mouth every morning for 30 days.  Dispense: 60 Capsule; Refill: 0    2. Anxiety   Under good control. Continue same regimen.'  - diazePAM (VALIUM) 2 MG Tab; Take 1 Tablet by mouth 1 time a day as needed for Anxiety or Sleep for up to 30 days.  Dispense: 30 Tablet; Refill: 2    3. Migraine with aura and without status migrainosus, not intractable-this is not at goal.  Start Imitrex.  Refer to neurology.  Consider Aimovig or Botox.  Declines amitriptyline low-dose 10 mg at bedtime but may consider this if the Imitrex does not seem to control her symptoms well.  After further Elavil at bedtime on a regular basis as opposed to the butalbital on a as needed basis which she is currently doing.  - SUMAtriptan (IMITREX) 50 MG Tab; Take 1 Tablet by mouth one time as needed for Migraine for up to 1 dose.  Dispense: 10 Tablet; Refill: 3  - Referral to Neurology    4. Acquired hypothyroidism-good control continue current regimen  - TSH; Future  - Comp Metabolic Panel; Future  - Lipid Profile; Future  - CBC WITH DIFFERENTIAL; Future    5. Vitamin D deficiency disease-not at goal.  Resume vitamin D3 and continue a regular basis of 2000 units daily.  She has been somewhat inconsistent with the dosing here.  - Cholecalciferol (VITAMIN D) 50 MCG (2000 UT) Cap; Take 1 Capsule by mouth every day.  Dispense: 100 Capsule; Refill: 3  - VITAMIN D,25 HYDROXY; Future    6. Mixed hyperlipidemia  - TSH; Future  - Comp Metabolic Panel;  Future  - Lipid Profile; Future  - CBC WITH DIFFERENTIAL; Future    7. Tension headache-well-controlled on the post migraine tension headaches.  But would prefer tricyclic antidepressant prophylactically daily at bedtime but she wants to hold off on that for now to see how the Imitrex works to prevent the tension headaches since they are the result of her migraines.  Discuss further with neurology and referral has been placed.  8. B12 deficiency-good control continue self administration  - cyanocobalamin (VITAMIN B-12) 1000 MCG/ML Solution; Inject 1 mL into the shoulder, thigh, or buttocks every 30 days for 30 days. Include syringes and needles  Dispense: 10 mL; Refill: 3    9. Rash-good control continue self applications- hydroquinone 4 % cream; Apply 1 Application topically 1 time a day as needed (rash).  Dispense: 28.35 g; Refill: 2    10. Estradiol deficiency-patient feels she may be going through the change of life and early menopause.  Labs ordered.  - FSH/LH; Future  - ESTROGENS FRACTIONATED; Future      Follow-up: Patient asked to schedule III month appointment for follow-up and she will need to be seen every 3 months since she has required such extensive time on the office visit this time discussing multiple problems and medications.  And also because she is going to be on higher dose of Adderall and this requires visits every 3 months rather than 6 months intervals..

## 2022-05-11 DIAGNOSIS — F90.0 ATTENTION DEFICIT HYPERACTIVITY DISORDER (ADHD), PREDOMINANTLY INATTENTIVE TYPE: ICD-10-CM

## 2022-05-11 RX ORDER — DEXTROAMPHETAMINE SACCHARATE, AMPHETAMINE ASPARTATE MONOHYDRATE, DEXTROAMPHETAMINE SULFATE AND AMPHETAMINE SULFATE 5; 5; 5; 5 MG/1; MG/1; MG/1; MG/1
40 CAPSULE, EXTENDED RELEASE ORAL EVERY MORNING
Qty: 60 CAPSULE | Refills: 0 | Status: SHIPPED | OUTPATIENT
Start: 2022-05-11 | End: 2024-02-20 | Stop reason: SDUPTHER

## 2022-05-20 ENCOUNTER — HOSPITAL ENCOUNTER (OUTPATIENT)
Facility: MEDICAL CENTER | Age: 42
End: 2022-05-20
Attending: ORTHOPAEDIC SURGERY | Admitting: ORTHOPAEDIC SURGERY
Payer: COMMERCIAL

## 2022-06-03 ENCOUNTER — PRE-ADMISSION TESTING (OUTPATIENT)
Dept: ADMISSIONS | Facility: MEDICAL CENTER | Age: 42
End: 2022-06-03
Attending: ORTHOPAEDIC SURGERY
Payer: COMMERCIAL

## 2022-06-03 VITALS — BODY MASS INDEX: 29.33 KG/M2 | HEIGHT: 62 IN | WEIGHT: 159.39 LBS

## 2022-06-03 DIAGNOSIS — Z01.812 PRE-OPERATIVE LABORATORY EXAMINATION: ICD-10-CM

## 2022-06-03 LAB
ANION GAP SERPL CALC-SCNC: 10 MMOL/L (ref 7–16)
BUN SERPL-MCNC: 11 MG/DL (ref 8–22)
CALCIUM SERPL-MCNC: 9.4 MG/DL (ref 8.4–10.2)
CHLORIDE SERPL-SCNC: 103 MMOL/L (ref 96–112)
CO2 SERPL-SCNC: 25 MMOL/L (ref 20–33)
CREAT SERPL-MCNC: 0.75 MG/DL (ref 0.5–1.4)
ERYTHROCYTE [DISTWIDTH] IN BLOOD BY AUTOMATED COUNT: 40.7 FL (ref 35.9–50)
GFR SERPLBLD CREATININE-BSD FMLA CKD-EPI: 103 ML/MIN/1.73 M 2
GLUCOSE SERPL-MCNC: 98 MG/DL (ref 65–99)
HCT VFR BLD AUTO: 46.4 % (ref 37–47)
HGB BLD-MCNC: 16.1 G/DL (ref 12–16)
MCH RBC QN AUTO: 31.8 PG (ref 27–33)
MCHC RBC AUTO-ENTMCNC: 34.7 G/DL (ref 33.6–35)
MCV RBC AUTO: 91.7 FL (ref 81.4–97.8)
PLATELET # BLD AUTO: 329 K/UL (ref 164–446)
PMV BLD AUTO: 9.3 FL (ref 9–12.9)
POTASSIUM SERPL-SCNC: 4.4 MMOL/L (ref 3.6–5.5)
RBC # BLD AUTO: 5.06 M/UL (ref 4.2–5.4)
SCCMEC + MECA PNL NOSE NAA+PROBE: NEGATIVE
SCCMEC + MECA PNL NOSE NAA+PROBE: NEGATIVE
SODIUM SERPL-SCNC: 138 MMOL/L (ref 135–145)
WBC # BLD AUTO: 7.9 K/UL (ref 4.8–10.8)

## 2022-06-03 PROCEDURE — 87641 MR-STAPH DNA AMP PROBE: CPT

## 2022-06-03 PROCEDURE — 85027 COMPLETE CBC AUTOMATED: CPT

## 2022-06-03 PROCEDURE — 36415 COLL VENOUS BLD VENIPUNCTURE: CPT

## 2022-06-03 PROCEDURE — 87640 STAPH A DNA AMP PROBE: CPT

## 2022-06-03 PROCEDURE — 80048 BASIC METABOLIC PNL TOTAL CA: CPT

## 2022-06-03 RX ORDER — ACETAMINOPHEN 500 MG
1000 TABLET ORAL 2 TIMES DAILY PRN
Status: ON HOLD | COMMUNITY
End: 2022-07-11

## 2022-06-03 RX ORDER — COVID-19 ANTIGEN TEST
440 KIT MISCELLANEOUS 2 TIMES DAILY PRN
Status: ON HOLD | COMMUNITY
End: 2022-07-11

## 2022-06-03 RX ORDER — DIAZEPAM 2 MG/1
2 TABLET ORAL NIGHTLY PRN
COMMUNITY
End: 2023-06-06 | Stop reason: SDUPTHER

## 2022-06-03 RX ORDER — DEXTROAMPHETAMINE SACCHARATE, AMPHETAMINE ASPARTATE, DEXTROAMPHETAMINE SULFATE AND AMPHETAMINE SULFATE 2.5; 2.5; 2.5; 2.5 MG/1; MG/1; MG/1; MG/1
5-10 TABLET ORAL 2 TIMES DAILY PRN
COMMUNITY
End: 2022-06-28

## 2022-06-03 ASSESSMENT — FIBROSIS 4 INDEX: FIB4 SCORE: 0.73

## 2022-06-03 NOTE — PREPROCEDURE INSTRUCTIONS
"Pre-admit appointment completed. \"Preparing for your procedure\" sheet given to pt along with verbal and written instructions. Pt instructed to continue regularly prescribed medications through the day before surgery. Pt instructed to take the following medications the day of surgery with a sip of water, per anesthesia protocol; synthroid, and if needed-flexeril, tylenol.    MET's=>4    Pt denies pain, burn, frequency with urination.  "

## 2022-06-10 ENCOUNTER — APPOINTMENT (RX ONLY)
Dept: URBAN - METROPOLITAN AREA CLINIC 20 | Facility: CLINIC | Age: 42
Setting detail: DERMATOLOGY
End: 2022-06-10

## 2022-06-10 DIAGNOSIS — L81.9 DISORDER OF PIGMENTATION, UNSPECIFIED: ICD-10-CM

## 2022-06-10 DIAGNOSIS — L70.0 ACNE VULGARIS: ICD-10-CM

## 2022-06-10 DIAGNOSIS — Z41.9 ENCOUNTER FOR PROCEDURE FOR PURPOSES OTHER THAN REMEDYING HEALTH STATE, UNSPECIFIED: ICD-10-CM

## 2022-06-10 DIAGNOSIS — D22 MELANOCYTIC NEVI: ICD-10-CM

## 2022-06-10 DIAGNOSIS — Z71.89 OTHER SPECIFIED COUNSELING: ICD-10-CM

## 2022-06-10 DIAGNOSIS — D18.0 HEMANGIOMA: ICD-10-CM

## 2022-06-10 DIAGNOSIS — L91.0 HYPERTROPHIC SCAR: ICD-10-CM

## 2022-06-10 DIAGNOSIS — L82.1 OTHER SEBORRHEIC KERATOSIS: ICD-10-CM

## 2022-06-10 DIAGNOSIS — L81.4 OTHER MELANIN HYPERPIGMENTATION: ICD-10-CM

## 2022-06-10 PROBLEM — D22.5 MELANOCYTIC NEVI OF TRUNK: Status: ACTIVE | Noted: 2022-06-10

## 2022-06-10 PROBLEM — D23.5 OTHER BENIGN NEOPLASM OF SKIN OF TRUNK: Status: ACTIVE | Noted: 2022-06-10

## 2022-06-10 PROBLEM — D18.01 HEMANGIOMA OF SKIN AND SUBCUTANEOUS TISSUE: Status: ACTIVE | Noted: 2022-06-10

## 2022-06-10 PROBLEM — D22.62 MELANOCYTIC NEVI OF LEFT UPPER LIMB, INCLUDING SHOULDER: Status: ACTIVE | Noted: 2022-06-10

## 2022-06-10 PROBLEM — D22.72 MELANOCYTIC NEVI OF LEFT LOWER LIMB, INCLUDING HIP: Status: ACTIVE | Noted: 2022-06-10

## 2022-06-10 PROBLEM — D22.71 MELANOCYTIC NEVI OF RIGHT LOWER LIMB, INCLUDING HIP: Status: ACTIVE | Noted: 2022-06-10

## 2022-06-10 PROBLEM — D23.71 OTHER BENIGN NEOPLASM OF SKIN OF RIGHT LOWER LIMB, INCLUDING HIP: Status: ACTIVE | Noted: 2022-06-10

## 2022-06-10 PROBLEM — D22.39 MELANOCYTIC NEVI OF OTHER PARTS OF FACE: Status: ACTIVE | Noted: 2022-06-10

## 2022-06-10 PROBLEM — D22.61 MELANOCYTIC NEVI OF RIGHT UPPER LIMB, INCLUDING SHOULDER: Status: ACTIVE | Noted: 2022-06-10

## 2022-06-10 PROCEDURE — ? ADDITIONAL NOTES

## 2022-06-10 PROCEDURE — ? SUNSCREEN RECOMMENDATIONS

## 2022-06-10 PROCEDURE — ? MEDICATION COUNSELING

## 2022-06-10 PROCEDURE — ? PULSED-DYE LASER

## 2022-06-10 PROCEDURE — ? PRESCRIPTION

## 2022-06-10 PROCEDURE — ? COUNSELING

## 2022-06-10 PROCEDURE — 99213 OFFICE O/P EST LOW 20 MIN: CPT

## 2022-06-10 RX ORDER — ADAPALENE 3 MG/G
GEL TOPICAL
Qty: 45 | Refills: 3 | Status: ERX

## 2022-06-10 ASSESSMENT — LOCATION ZONE DERM
LOCATION ZONE: LEG
LOCATION ZONE: ARM
LOCATION ZONE: LEG
LOCATION ZONE: TRUNK
LOCATION ZONE: ARM
LOCATION ZONE: FACE
LOCATION ZONE: TRUNK

## 2022-06-10 ASSESSMENT — LOCATION DETAILED DESCRIPTION DERM
LOCATION DETAILED: RIGHT DISTAL DORSAL FOREARM
LOCATION DETAILED: LEFT PROXIMAL POSTERIOR UPPER ARM
LOCATION DETAILED: INFERIOR THORACIC SPINE
LOCATION DETAILED: RIGHT DISTAL POSTERIOR UPPER ARM
LOCATION DETAILED: RIGHT INFERIOR CENTRAL MALAR CHEEK
LOCATION DETAILED: RIGHT DISTAL POSTERIOR THIGH
LOCATION DETAILED: LEFT DISTAL POSTERIOR THIGH
LOCATION DETAILED: RIGHT INFERIOR FOREHEAD
LOCATION DETAILED: RIGHT PROXIMAL PRETIBIAL REGION
LOCATION DETAILED: RIGHT VENTRAL PROXIMAL FOREARM
LOCATION DETAILED: LEFT VENTRAL PROXIMAL FOREARM
LOCATION DETAILED: RIGHT LATERAL MALAR CHEEK
LOCATION DETAILED: RIGHT PROXIMAL PRETIBIAL REGION
LOCATION DETAILED: RIGHT SUPERIOR UPPER BACK
LOCATION DETAILED: RIGHT INFERIOR MEDIAL MIDBACK
LOCATION DETAILED: LEFT LATERAL PROXIMAL PRETIBIAL REGION
LOCATION DETAILED: LEFT INFERIOR CENTRAL MALAR CHEEK
LOCATION DETAILED: RIGHT MEDIAL BREAST 2-3:00 REGION
LOCATION DETAILED: RIGHT DISTAL DORSAL FOREARM
LOCATION DETAILED: RIGHT INFERIOR MEDIAL UPPER BACK

## 2022-06-10 ASSESSMENT — LOCATION SIMPLE DESCRIPTION DERM
LOCATION SIMPLE: LEFT POSTERIOR UPPER ARM
LOCATION SIMPLE: RIGHT CHEEK
LOCATION SIMPLE: RIGHT POSTERIOR UPPER ARM
LOCATION SIMPLE: RIGHT FOREARM
LOCATION SIMPLE: RIGHT FOREHEAD
LOCATION SIMPLE: RIGHT POSTERIOR THIGH
LOCATION SIMPLE: RIGHT LOWER BACK
LOCATION SIMPLE: LEFT CHEEK
LOCATION SIMPLE: LEFT PRETIBIAL REGION
LOCATION SIMPLE: RIGHT PRETIBIAL REGION
LOCATION SIMPLE: RIGHT BREAST
LOCATION SIMPLE: LEFT FOREARM
LOCATION SIMPLE: RIGHT FOREARM
LOCATION SIMPLE: RIGHT PRETIBIAL REGION
LOCATION SIMPLE: UPPER BACK
LOCATION SIMPLE: LEFT POSTERIOR THIGH
LOCATION SIMPLE: RIGHT UPPER BACK

## 2022-06-10 NOTE — PROCEDURE: COUNSELING
Detail Level: Zone
Detail Level: Detailed
Sarecycline Counseling: Patient advised regarding possible photosensitivity and discoloration of the teeth, skin, lips, tongue and gums.  Patient instructed to avoid sunlight, if possible.  When exposed to sunlight, patients should wear protective clothing, sunglasses, and sunscreen.  The patient was instructed to call the office immediately if the following severe adverse effects occur:  hearing changes, easy bruising/bleeding, severe headache, or vision changes.  The patient verbalized understanding of the proper use and possible adverse effects of sarecycline.  All of the patient's questions and concerns were addressed.
Doxycycline Pregnancy And Lactation Text: This medication is Pregnancy Category D and not consider safe during pregnancy. It is also excreted in breast milk but is considered safe for shorter treatment courses.
Tetracycline Counseling: Patient counseled regarding possible photosensitivity and increased risk for sunburn.  Patient instructed to avoid sunlight, if possible.  When exposed to sunlight, patients should wear protective clothing, sunglasses, and sunscreen.  The patient was instructed to call the office immediately if the following severe adverse effects occur:  hearing changes, easy bruising/bleeding, severe headache, or vision changes.  The patient verbalized understanding of the proper use and possible adverse effects of tetracycline.  All of the patient's questions and concerns were addressed. Patient understands to avoid pregnancy while on therapy due to potential birth defects.
Azithromycin Counseling:  I discussed with the patient the risks of azithromycin including but not limited to GI upset, allergic reaction, drug rash, diarrhea, and yeast infections.
Isotretinoin Pregnancy And Lactation Text: This medication is Pregnancy Category X and is considered extremely dangerous during pregnancy. It is unknown if it is excreted in breast milk.
Topical Clindamycin Pregnancy And Lactation Text: This medication is Pregnancy Category B and is considered safe during pregnancy. It is unknown if it is excreted in breast milk.
Birth Control Pills Counseling: Birth Control Pill Counseling: I discussed with the patient the potential side effects of OCPs including but not limited to increased risk of stroke, heart attack, thrombophlebitis, deep venous thrombosis, hepatic adenomas, breast changes, GI upset, headaches, and depression.  The patient verbalized understanding of the proper use and possible adverse effects of OCPs. All of the patient's questions and concerns were addressed.
Include Pregnancy/Lactation Warning?: No
Doxycycline Counseling:  Patient counseled regarding possible photosensitivity and increased risk for sunburn.  Patient instructed to avoid sunlight, if possible.  When exposed to sunlight, patients should wear protective clothing, sunglasses, and sunscreen.  The patient was instructed to call the office immediately if the following severe adverse effects occur:  hearing changes, easy bruising/bleeding, severe headache, or vision changes.  The patient verbalized understanding of the proper use and possible adverse effects of doxycycline.  All of the patient's questions and concerns were addressed.
Erythromycin Counseling:  I discussed with the patient the risks of erythromycin including but not limited to GI upset, allergic reaction, drug rash, diarrhea, increase in liver enzymes, and yeast infections.
Tazorac Counseling:  Patient advised that medication is irritating and drying.  Patient may need to apply sparingly and wash off after an hour before eventually leaving it on overnight.  The patient verbalized understanding of the proper use and possible adverse effects of tazorac.  All of the patient's questions and concerns were addressed.
High Dose Vitamin A Counseling: Side effects reviewed, pt to contact office should one occur.
Azithromycin Pregnancy And Lactation Text: This medication is considered safe during pregnancy and is also secreted in breast milk.
Sarecycline Pregnancy And Lactation Text: This medication is Pregnancy Category D and not consider safe during pregnancy. It is also excreted in breast milk.
Birth Control Pills Pregnancy And Lactation Text: This medication should be avoided if pregnant and for the first 30 days post-partum.
Topical Retinoid Pregnancy And Lactation Text: This medication is Pregnancy Category C. It is unknown if this medication is excreted in breast milk.
Topical Sulfur Applications Counseling: Topical Sulfur Counseling: Patient counseled that this medication may cause skin irritation or allergic reactions.  In the event of skin irritation, the patient was advised to reduce the amount of the drug applied or use it less frequently.   The patient verbalized understanding of the proper use and possible adverse effects of topical sulfur application.  All of the patient's questions and concerns were addressed.
Bactrim Counseling:  I discussed with the patient the risks of sulfa antibiotics including but not limited to GI upset, allergic reaction, drug rash, diarrhea, dizziness, photosensitivity, and yeast infections.  Rarely, more serious reactions can occur including but not limited to aplastic anemia, agranulocytosis, methemoglobinemia, blood dyscrasias, liver or kidney failure, lung infiltrates or desquamative/blistering drug rashes.
Dapsone Pregnancy And Lactation Text: This medication is Pregnancy Category C and is not considered safe during pregnancy or breast feeding.
Erythromycin Pregnancy And Lactation Text: This medication is Pregnancy Category B and is considered safe during pregnancy. It is also excreted in breast milk.
Tazorac Pregnancy And Lactation Text: This medication is not safe during pregnancy. It is unknown if this medication is excreted in breast milk.
High Dose Vitamin A Pregnancy And Lactation Text: High dose vitamin A therapy is contraindicated during pregnancy and breast feeding.
Topical Sulfur Applications Pregnancy And Lactation Text: This medication is Pregnancy Category C and has an unknown safety profile during pregnancy. It is unknown if this topical medication is excreted in breast milk.
Spironolactone Counseling: Patient advised regarding risks of diarrhea, abdominal pain, hyperkalemia, birth defects (for female patients), liver toxicity and renal toxicity. The patient may need blood work to monitor liver and kidney function and potassium levels while on therapy. The patient verbalized understanding of the proper use and possible adverse effects of spironolactone.  All of the patient's questions and concerns were addressed.
Dapsone Counseling: I discussed with the patient the risks of dapsone including but not limited to hemolytic anemia, agranulocytosis, rashes, methemoglobinemia, kidney failure, peripheral neuropathy, headaches, GI upset, and liver toxicity.  Patients who start dapsone require monitoring including baseline LFTs and weekly CBCs for the first month, then every month thereafter.  The patient verbalized understanding of the proper use and possible adverse effects of dapsone.  All of the patient's questions and concerns were addressed.
Benzoyl Peroxide Counseling: Patient counseled that medicine may cause skin irritation and bleach clothing.  In the event of skin irritation, the patient was advised to reduce the amount of the drug applied or use it less frequently.   The patient verbalized understanding of the proper use and possible adverse effects of benzoyl peroxide.  All of the patient's questions and concerns were addressed.
Topical Retinoid counseling:  Patient advised to apply a pea-sized amount only at bedtime and wait 30 minutes after washing their face before applying.  If too drying, patient may add a non-comedogenic moisturizer. The patient verbalized understanding of the proper use and possible adverse effects of retinoids.  All of the patient's questions and concerns were addressed.
Topical Clindamycin Counseling: Patient counseled that this medication may cause skin irritation or allergic reactions.  In the event of skin irritation, the patient was advised to reduce the amount of the drug applied or use it less frequently.   The patient verbalized understanding of the proper use and possible adverse effects of clindamycin.  All of the patient's questions and concerns were addressed.
Bactrim Pregnancy And Lactation Text: This medication is Pregnancy Category D and is known to cause fetal risk.  It is also excreted in breast milk.
Spironolactone Pregnancy And Lactation Text: This medication can cause feminization of the male fetus and should be avoided during pregnancy. The active metabolite is also found in breast milk.
Isotretinoin Counseling: Patient should get monthly blood tests, not donate blood, not drive at night if vision affected, not share medication, and not undergo elective surgery for 6 months after tx completed. Side effects reviewed, pt to contact office should one occur.
Benzoyl Peroxide Pregnancy And Lactation Text: This medication is Pregnancy Category C. It is unknown if benzoyl peroxide is excreted in breast milk.
Minocycline Counseling: Patient advised regarding possible photosensitivity and discoloration of the teeth, skin, lips, tongue and gums.  Patient instructed to avoid sunlight, if possible.  When exposed to sunlight, patients should wear protective clothing, sunglasses, and sunscreen.  The patient was instructed to call the office immediately if the following severe adverse effects occur:  hearing changes, easy bruising/bleeding, severe headache, or vision changes.  The patient verbalized understanding of the proper use and possible adverse effects of minocycline.  All of the patient's questions and concerns were addressed.

## 2022-06-10 NOTE — PROCEDURE: MEDICATION COUNSELING
Dutasteride Pregnancy And Lactation Text: This medication is absolutely contraindicated in women, especially during pregnancy and breast feeding. Feminization of male fetuses is possible if taking while pregnant.
Fluconazole Counseling:  Patient counseled regarding adverse effects of fluconazole including but not limited to headache, diarrhea, nausea, upset stomach, liver function test abnormalities, taste disturbance, and stomach pain.  There is a rare possibility of liver failure that can occur when taking fluconazole.  The patient understands that monitoring of LFTs and kidney function test may be required, especially at baseline. The patient verbalized understanding of the proper use and possible adverse effects of fluconazole.  All of the patient's questions and concerns were addressed.
Wartpeel Pregnancy And Lactation Text: This medication is Pregnancy Category X and contraindicated in pregnancy and in women who may become pregnant. It is unknown if this medication is excreted in breast milk.
Siliq Pregnancy And Lactation Text: The risk during pregnancy and breastfeeding is uncertain with this medication.
Protopic Pregnancy And Lactation Text: This medication is Pregnancy Category C. It is unknown if this medication is excreted in breast milk when applied topically.
Otezla Pregnancy And Lactation Text: This medication is Pregnancy Category C and it isn't known if it is safe during pregnancy. It is unknown if it is excreted in breast milk.
Cellcept Pregnancy And Lactation Text: This medication is Pregnancy Category D and isn't considered safe during pregnancy. It is unknown if this medication is excreted in breast milk.
Protopic Counseling: Patient may experience a mild burning sensation during topical application. Protopic is not approved in children less than 2 years of age. There have been case reports of hematologic and skin malignancies in patients using topical calcineurin inhibitors although causality is questionable.
Simponi Counseling:  I discussed with the patient the risks of golimumab including but not limited to myelosuppression, immunosuppression, autoimmune hepatitis, demyelinating diseases, lymphoma, and serious infections.  The patient understands that monitoring is required including a PPD at baseline and must alert us or the primary physician if symptoms of infection or other concerning signs are noted.
Cellcept Counseling:  I discussed with the patient the risks of mycophenolate mofetil including but not limited to infection/immunosuppression, GI upset, hypokalemia, hypercholesterolemia, bone marrow suppression, lymphoproliferative disorders, malignancy, GI ulceration/bleed/perforation, colitis, interstitial lung disease, kidney failure, progressive multifocal leukoencephalopathy, and birth defects.  The patient understands that monitoring is required including a baseline creatinine and regular CBC testing. In addition, patient must alert us immediately if symptoms of infection or other concerning signs are noted.
Wartpeel Counseling:  I discussed with the patient the risks of Wartpeel including but not limited to erythema, scaling, itching, weeping, crusting, and pain.
Oxybutynin Counseling:  I discussed with the patient the risks of oxybutynin including but not limited to skin rash, drowsiness, dry mouth, difficulty urinating, and blurred vision.
Albendazole Pregnancy And Lactation Text: This medication is Pregnancy Category C and it isn't known if it is safe during pregnancy. It is also excreted in breast milk.
Elidel Counseling: Patient may experience a mild burning sensation during topical application. Elidel is not approved in children less than 2 years of age. There have been case reports of hematologic and skin malignancies in patients using topical calcineurin inhibitors although causality is questionable.
Clindamycin Pregnancy And Lactation Text: This medication can be used in pregnancy if certain situations. Clindamycin is also present in breast milk.
Doxycycline Counseling:  Patient counseled regarding possible photosensitivity and increased risk for sunburn.  Patient instructed to avoid sunlight, if possible.  When exposed to sunlight, patients should wear protective clothing, sunglasses, and sunscreen.  The patient was instructed to call the office immediately if the following severe adverse effects occur:  hearing changes, easy bruising/bleeding, severe headache, or vision changes.  The patient verbalized understanding of the proper use and possible adverse effects of doxycycline.  All of the patient's questions and concerns were addressed.
Gabapentin Counseling: I discussed with the patient the risks of gabapentin including but not limited to dizziness, somnolence, fatigue and ataxia.
Cyclophosphamide Counseling:  I discussed with the patient the risks of cyclophosphamide including but not limited to hair loss, hormonal abnormalities, decreased fertility, abdominal pain, diarrhea, nausea and vomiting, bone marrow suppression and infection. The patient understands that monitoring is required while taking this medication.
Winlevi Counseling:  I discussed with the patient the risks of topical clascoterone including but not limited to erythema, scaling, itching, and stinging. Patient voiced their understanding.
Fluconazole Pregnancy And Lactation Text: This medication is Pregnancy Category C and it isn't know if it is safe during pregnancy. It is also excreted in breast milk.
Griseofulvin Counseling:  I discussed with the patient the risks of griseofulvin including but not limited to photosensitivity, cytopenia, liver damage, nausea/vomiting and severe allergy.  The patient understands that this medication is best absorbed when taken with a fatty meal (e.g., ice cream or french fries).
Elidel Pregnancy And Lactation Text: This medication is Pregnancy Category C. It is unknown if this medication is excreted in breast milk.
Oxybutynin Pregnancy And Lactation Text: This medication is Pregnancy Category B and is considered safe during pregnancy. It is unknown if it is excreted in breast milk.
Erivedge Counseling- I discussed with the patient the risks of Erivedge including but not limited to nausea, vomiting, diarrhea, constipation, weight loss, changes in the sense of taste, decreased appetite, muscle spasms, and hair loss.  The patient verbalized understanding of the proper use and possible adverse effects of Erivedge.  All of the patient's questions and concerns were addressed.
Ivermectin Counseling:  Patient instructed to take medication on an empty stomach with a full glass of water.  Patient informed of potential adverse effects including but not limited to nausea, diarrhea, dizziness, itching, and swelling of the extremities or lymph nodes.  The patient verbalized understanding of the proper use and possible adverse effects of ivermectin.  All of the patient's questions and concerns were addressed.
Winlevi Pregnancy And Lactation Text: This medication is considered safe during pregnancy and breastfeeding.
Cyclophosphamide Pregnancy And Lactation Text: This medication is Pregnancy Category D and it isn't considered safe during pregnancy. This medication is excreted in breast milk.
Eucrisa Pregnancy And Lactation Text: This medication has not been assigned a Pregnancy Risk Category but animal studies failed to show danger with the topical medication. It is unknown if the medication is excreted in breast milk.
Doxycycline Pregnancy And Lactation Text: This medication is Pregnancy Category D and not consider safe during pregnancy. It is also excreted in breast milk but is considered safe for shorter treatment courses.
Qbrexza Pregnancy And Lactation Text: There is no available data on Qbrexza use in pregnant women.  There is no available data on Qbrexza use in lactation.
Gabapentin Pregnancy And Lactation Text: This medication is Pregnancy Category C and isn't considered safe during pregnancy. It is excreted in breast milk.
Skyrizi Counseling: I discussed with the patient the risks of risankizumab-rzaa including but not limited to immunosuppression, and serious infections.  The patient understands that monitoring is required including a PPD at baseline and must alert us or the primary physician if symptoms of infection or other concerning signs are noted.
Erythromycin Counseling:  I discussed with the patient the risks of erythromycin including but not limited to GI upset, allergic reaction, drug rash, diarrhea, increase in liver enzymes, and yeast infections.
Eucrisa Counseling: Patient may experience a mild burning sensation during topical application. Eucrisa is not approved in children less than 2 years of age.
Qbrexza Counseling:  I discussed with the patient the risks of Qbrexza including but not limited to headache, mydriasis, blurred vision, dry eyes, nasal dryness, dry mouth, dry throat, dry skin, urinary hesitation, and constipation.  Local skin reactions including erythema, burning, stinging, and itching can also occur.
Enbrel Counseling:  I discussed with the patient the risks of etanercept including but not limited to myelosuppression, immunosuppression, autoimmune hepatitis, demyelinating diseases, lymphoma, and infections.  The patient understands that monitoring is required including a PPD at baseline and must alert us or the primary physician if symptoms of infection or other concerning signs are noted.
Glycopyrrolate Counseling:  I discussed with the patient the risks of glycopyrrolate including but not limited to skin rash, drowsiness, dry mouth, difficulty urinating, and blurred vision.
Aklief counseling:  Patient advised to apply a pea-sized amount only at bedtime and wait 30 minutes after washing their face before applying.  If too drying, patient may add a non-comedogenic moisturizer.  The most commonly reported side effects including irritation, redness, scaling, dryness, stinging, burning, itching, and increased risk of sunburn.  The patient verbalized understanding of the proper use and possible adverse effects of retinoids.  All of the patient's questions and concerns were addressed.
Griseofulvin Pregnancy And Lactation Text: This medication is Pregnancy Category X and is known to cause serious birth defects. It is unknown if this medication is excreted in breast milk but breast feeding should be avoided.
Erivedge Pregnancy And Lactation Text: This medication is Pregnancy Category X and is absolutely contraindicated during pregnancy. It is unknown if it is excreted in breast milk.
Propranolol Counseling:  I discussed with the patient the risks of propranolol including but not limited to low heart rate, low blood pressure, low blood sugar, restlessness and increased cold sensitivity. They should call the office if they experience any of these side effects.
Propranolol Pregnancy And Lactation Text: This medication is Pregnancy Category C and it isn't known if it is safe during pregnancy. It is excreted in breast milk.
Rhofade Counseling: Rhofade is a topical medication which can decrease superficial blood flow where applied. Side effects are uncommon and include stinging, redness and allergic reactions.
Azelaic Acid Counseling: Patient counseled that medicine may cause skin irritation and to avoid applying near the eyes.  In the event of skin irritation, the patient was advised to reduce the amount of the drug applied or use it less frequently.   The patient verbalized understanding of the proper use and possible adverse effects of azelaic acid.  All of the patient's questions and concerns were addressed.
Erythromycin Pregnancy And Lactation Text: This medication is Pregnancy Category B and is considered safe during pregnancy. It is also excreted in breast milk.
Aklief Pregnancy And Lactation Text: It is unknown if this medication is safe to use during pregnancy.  It is unknown if this medication is excreted in breast milk.  Breastfeeding women should use the topical cream on the smallest area of the skin for the shortest time needed while breastfeeding.  Do not apply to nipple and areola.
Enbrel Pregnancy And Lactation Text: This medication is Pregnancy Category B and is considered safe during pregnancy. It is unknown if this medication is excreted in breast milk.
Glycopyrrolate Pregnancy And Lactation Text: This medication is Pregnancy Category B and is considered safe during pregnancy. It is unknown if it is excreted breast milk.
Zyclara Counseling:  I discussed with the patient the risks of imiquimod including but not limited to erythema, scaling, itching, weeping, crusting, and pain.  Patient understands that the inflammatory response to imiquimod is variable from person to person and was educated regarded proper titration schedule.  If flu-like symptoms develop, patient knows to discontinue the medication and contact us.
Itraconazole Counseling:  I discussed with the patient the risks of itraconazole including but not limited to liver damage, nausea/vomiting, neuropathy, and severe allergy.  The patient understands that this medication is best absorbed when taken with acidic beverages such as non-diet cola or ginger ale.  The patient understands that monitoring is required including baseline LFTs and repeat LFTs at intervals.  The patient understands that they are to contact us or the primary physician if concerning signs are noted.
Cyclosporine Counseling:  I discussed with the patient the risks of cyclosporine including but not limited to hypertension, gingival hyperplasia,myelosuppression, immunosuppression, liver damage, kidney damage, neurotoxicity, lymphoma, and serious infections. The patient understands that monitoring is required including baseline blood pressure, CBC, CMP, lipid panel and uric acid, and then 1-2 times monthly CMP and blood pressure.
Rhofade Pregnancy And Lactation Text: This medication has not been assigned a Pregnancy Risk Category. It is unknown if the medication is excreted in breast milk.
Hydroxychloroquine Counseling:  I discussed with the patient that a baseline ophthalmologic exam is needed at the start of therapy and every year thereafter while on therapy. A CBC may also be warranted for monitoring.  The side effects of this medication were discussed with the patient, including but not limited to agranulocytosis, aplastic anemia, seizures, rashes, retinopathy, and liver toxicity. Patient instructed to call the office should any adverse effect occur.  The patient verbalized understanding of the proper use and possible adverse effects of Plaquenil.  All the patient's questions and concerns were addressed.
Stelara Counseling:  I discussed with the patient the risks of ustekinumab including but not limited to immunosuppression, malignancy, posterior leukoencephalopathy syndrome, and serious infections.  The patient understands that monitoring is required including a PPD at baseline and must alert us or the primary physician if symptoms of infection or other concerning signs are noted.
Birth Control Pills Counseling: Birth Control Pill Counseling: I discussed with the patient the potential side effects of OCPs including but not limited to increased risk of stroke, heart attack, thrombophlebitis, deep venous thrombosis, hepatic adenomas, breast changes, GI upset, headaches, and depression.  The patient verbalized understanding of the proper use and possible adverse effects of OCPs. All of the patient's questions and concerns were addressed.
Humira Counseling:  I discussed with the patient the risks of adalimumab including but not limited to myelosuppression, immunosuppression, autoimmune hepatitis, demyelinating diseases, lymphoma, and serious infections.  The patient understands that monitoring is required including a PPD at baseline and must alert us or the primary physician if symptoms of infection or other concerning signs are noted.
Detail Level: Zone
Hydroquinone Counseling:  Patient advised that medication may result in skin irritation, lightening (hypopigmentation), dryness, and burning.  In the event of skin irritation, the patient was advised to reduce the amount of the drug applied or use it less frequently.  Rarely, spots that are treated with hydroquinone can become darker (pseudoochronosis).  Should this occur, patient instructed to stop medication and call the office. The patient verbalized understanding of the proper use and possible adverse effects of hydroquinone.  All of the patient's questions and concerns were addressed.
Cyclosporine Pregnancy And Lactation Text: This medication is Pregnancy Category C and it isn't know if it is safe during pregnancy. This medication is excreted in breast milk.
Metronidazole Counseling:  I discussed with the patient the risks of metronidazole including but not limited to seizures, nausea/vomiting, a metallic taste in the mouth, nausea/vomiting and severe allergy.
Metronidazole Pregnancy And Lactation Text: This medication is Pregnancy Category B and considered safe during pregnancy.  It is also excreted in breast milk.
Imiquimod Counseling:  I discussed with the patient the risks of imiquimod including but not limited to erythema, scaling, itching, weeping, crusting, and pain.  Patient understands that the inflammatory response to imiquimod is variable from person to person and was educated regarded proper titration schedule.  If flu-like symptoms develop, patient knows to discontinue the medication and contact us.
Hydroxychloroquine Pregnancy And Lactation Text: This medication has been shown to cause fetal harm but it isn't assigned a Pregnancy Risk Category. There are small amounts excreted in breast milk.
Birth Control Pills Pregnancy And Lactation Text: This medication should be avoided if pregnant and for the first 30 days post-partum.
Methotrexate Pregnancy And Lactation Text: This medication is Pregnancy Category X and is known to cause fetal harm. This medication is excreted in breast milk.
Opioid Counseling: I discussed with the patient the potential side effects of opioids including but not limited to addiction, altered mental status, and depression. I stressed avoiding alcohol, benzodiazepines, muscle relaxants and sleep aids unless specifically okayed by a physician. The patient verbalized understanding of the proper use and possible adverse effects of opioids. All of the patient's questions and concerns were addressed. They were instructed to flush the remaining pills down the toilet if they did not need them for pain.
Methotrexate Counseling:  Patient counseled regarding adverse effects of methotrexate including but not limited to nausea, vomiting, abnormalities in liver function tests. Patients may develop mouth sores, rash, diarrhea, and abnormalities in blood counts. The patient understands that monitoring is required including LFT's and blood counts.  There is a rare possibility of scarring of the liver and lung problems that can occur when taking methotrexate. Persistent nausea, loss of appetite, pale stools, dark urine, cough, and shortness of breath should be reported immediately. Patient advised to discontinue methotrexate treatment at least three months before attempting to become pregnant.  I discussed the need for folate supplements while taking methotrexate.  These supplements can decrease side effects during methotrexate treatment. The patient verbalized understanding of the proper use and possible adverse effects of methotrexate.  All of the patient's questions and concerns were addressed.
Azelaic Acid Pregnancy And Lactation Text: This medication is considered safe during pregnancy and breast feeding.
Solaraze Counseling:  I discussed with the patient the risks of Solaraze including but not limited to erythema, scaling, itching, weeping, crusting, and pain.
Ketoconazole Counseling:   Patient counseled regarding improving absorption with orange juice.  Adverse effects include but are not limited to breast enlargement, headache, diarrhea, nausea, upset stomach, liver function test abnormalities, taste disturbance, and stomach pain.  There is a rare possibility of liver failure that can occur when taking ketoconazole. The patient understands that monitoring of LFTs may be required, especially at baseline. The patient verbalized understanding of the proper use and possible adverse effects of ketoconazole.  All of the patient's questions and concerns were addressed.
Ketoconazole Pregnancy And Lactation Text: This medication is Pregnancy Category C and it isn't know if it is safe during pregnancy. It is also excreted in breast milk and breast feeding isn't recommended.
Libtayo Counseling- I discussed with the patient the risks of Libtayo including but not limited to nausea, vomiting, diarrhea, and bone or muscle pain.  The patient verbalized understanding of the proper use and possible adverse effects of Libtayo.  All of the patient's questions and concerns were addressed.
Opioid Pregnancy And Lactation Text: These medications can lead to premature delivery and should be avoided during pregnancy. These medications are also present in breast milk in small amounts.
Spironolactone Counseling: Patient advised regarding risks of diarrhea, abdominal pain, hyperkalemia, birth defects (for female patients), liver toxicity and renal toxicity. The patient may need blood work to monitor liver and kidney function and potassium levels while on therapy. The patient verbalized understanding of the proper use and possible adverse effects of spironolactone.  All of the patient's questions and concerns were addressed.
Benzoyl Peroxide Pregnancy And Lactation Text: This medication is Pregnancy Category C. It is unknown if benzoyl peroxide is excreted in breast milk.
Adbry Counseling: I discussed with the patient the risks of tralokinumab including but not limited to eye infection and irritation, cold sores, injection site reactions, worsening of asthma, allergic reactions and increased risk of parasitic infection.  Live vaccines should be avoided while taking tralokinumab. The patient understands that monitoring is required and they must alert us or the primary physician if symptoms of infection or other concerning signs are noted.
Benzoyl Peroxide Counseling: Patient counseled that medicine may cause skin irritation and bleach clothing.  In the event of skin irritation, the patient was advised to reduce the amount of the drug applied or use it less frequently.   The patient verbalized understanding of the proper use and possible adverse effects of benzoyl peroxide.  All of the patient's questions and concerns were addressed.
Adbry Pregnancy And Lactation Text: It is unknown if this medication will adversely affect pregnancy or breast feeding.
Libtayo Pregnancy And Lactation Text: This medication is contraindicated in pregnancy and when breast feeding.
Solaraze Pregnancy And Lactation Text: This medication is Pregnancy Category B and is considered safe. There is some data to suggest avoiding during the third trimester. It is unknown if this medication is excreted in breast milk.
Minocycline Counseling: Patient advised regarding possible photosensitivity and discoloration of the teeth, skin, lips, tongue and gums.  Patient instructed to avoid sunlight, if possible.  When exposed to sunlight, patients should wear protective clothing, sunglasses, and sunscreen.  The patient was instructed to call the office immediately if the following severe adverse effects occur:  hearing changes, easy bruising/bleeding, severe headache, or vision changes.  The patient verbalized understanding of the proper use and possible adverse effects of minocycline.  All of the patient's questions and concerns were addressed.
Taltz Counseling: I discussed with the patient the risks of ixekizumab including but not limited to immunosuppression, serious infections, worsening of inflammatory bowel disease and drug reactions.  The patient understands that monitoring is required including a PPD at baseline and must alert us or the primary physician if symptoms of infection or other concerning signs are noted.
Ilumya Counseling: I discussed with the patient the risks of tildrakizumab including but not limited to immunosuppression, malignancy, posterior leukoencephalopathy syndrome, and serious infections.  The patient understands that monitoring is required including a PPD at baseline and must alert us or the primary physician if symptoms of infection or other concerning signs are noted.
Include Pregnancy/Lactation Warning?: No
Carac Counseling:  I discussed with the patient the risks of Carac including but not limited to erythema, scaling, itching, weeping, crusting, and pain.
Terbinafine Counseling: Patient counseling regarding adverse effects of terbinafine including but not limited to headache, diarrhea, rash, upset stomach, liver function test abnormalities, itching, taste/smell disturbance, nausea, abdominal pain, and flatulence.  There is a rare possibility of liver failure that can occur when taking terbinafine.  The patient understands that a baseline LFT and kidney function test may be required. The patient verbalized understanding of the proper use and possible adverse effects of terbinafine.  All of the patient's questions and concerns were addressed.
Spironolactone Pregnancy And Lactation Text: This medication can cause feminization of the male fetus and should be avoided during pregnancy. The active metabolite is also found in breast milk.
Tremfya Counseling: I discussed with the patient the risks of guselkumab including but not limited to immunosuppression, serious infections, worsening of inflammatory bowel disease and drug reactions.  The patient understands that monitoring is required including a PPD at baseline and must alert us or the primary physician if symptoms of infection or other concerning signs are noted.
SSKI Counseling:  I discussed with the patient the risks of SSKI including but not limited to thyroid abnormalities, metallic taste, GI upset, fever, headache, acne, arthralgias, paraesthesias, lymphadenopathy, easy bleeding, arrhythmias, and allergic reaction.
Topical Retinoid counseling:  Patient advised to apply a pea-sized amount only at bedtime and wait 30 minutes after washing their face before applying.  If too drying, patient may add a non-comedogenic moisturizer. The patient verbalized understanding of the proper use and possible adverse effects of retinoids.  All of the patient's questions and concerns were addressed.
Prednisone Counseling:  I discussed with the patient the risks of prolonged use of prednisone including but not limited to weight gain, insomnia, osteoporosis, mood changes, diabetes, susceptibility to infection, glaucoma and high blood pressure.  In cases where prednisone use is prolonged, patients should be monitored with blood pressure checks, serum glucose levels and an eye exam.  Additionally, the patient may need to be placed on GI prophylaxis, PCP prophylaxis, and calcium and vitamin D supplementation and/or a bisphosphonate.  The patient verbalized understanding of the proper use and the possible adverse effects of prednisone.  All of the patient's questions and concerns were addressed.
Cibinqo Counseling: I discussed with the patient the risks of Cibinqo therapy including but not limited to common cold, nausea, headache, cold sores, increased blood CPK levels, dizziness, UTIs, fatigue, acne, and vomitting. Live vaccines should be avoided.  This medication has been linked to serious infections; higher rate of mortality; malignancy and lymphoproliferative disorders; major adverse cardiovascular events; thrombosis; thrombocytopenia and lymphopenia; lipid elevations; and retinal detachment.
Klisyri Counseling:  I discussed with the patient the risks of Klisyri including but not limited to erythema, scaling, itching, weeping, crusting, and pain.
Minocycline Pregnancy And Lactation Text: This medication is Pregnancy Category D and not consider safe during pregnancy. It is also excreted in breast milk.
Quinolones Counseling:  I discussed with the patient the risks of fluoroquinolones including but not limited to GI upset, allergic reaction, drug rash, diarrhea, dizziness, photosensitivity, yeast infections, liver function test abnormalities, tendonitis/tendon rupture.
Infliximab Counseling:  I discussed with the patient the risks of infliximab including but not limited to myelosuppression, immunosuppression, autoimmune hepatitis, demyelinating diseases, lymphoma, and serious infections.  The patient understands that monitoring is required including a PPD at baseline and must alert us or the primary physician if symptoms of infection or other concerning signs are noted.
Niacinamide Counseling: I recommended taking niacin or niacinamide, also know as vitamin B3, twice daily. Recent evidence suggests that taking vitamin B3 (500 mg twice daily) can reduce the risk of actinic keratoses and non-melanoma skin cancers. Side effects of vitamin B3 include flushing and headache.
Arava Counseling:  Patient counseled regarding adverse effects of Arava including but not limited to nausea, vomiting, abnormalities in liver function tests. Patients may develop mouth sores, rash, diarrhea, and abnormalities in blood counts. The patient understands that monitoring is required including LFTs and blood counts.  There is a rare possibility of scarring of the liver and lung problems that can occur when taking methotrexate. Persistent nausea, loss of appetite, pale stools, dark urine, cough, and shortness of breath should be reported immediately. Patient advised to discontinue Arava treatment and consult with a physician prior to attempting conception. The patient will have to undergo a treatment to eliminate Arava from the body prior to conception.
Terbinafine Pregnancy And Lactation Text: This medication is Pregnancy Category B and is considered safe during pregnancy. It is also excreted in breast milk and breast feeding isn't recommended.
Klisyri Pregnancy And Lactation Text: It is unknown if this medication can harm a developing fetus or if it is excreted in breast milk.
Sski Pregnancy And Lactation Text: This medication is Pregnancy Category D and isn't considered safe during pregnancy. It is excreted in breast milk.
Cibinqo Pregnancy And Lactation Text: It is unknown if this medication will adversely affect pregnancy or breast feeding.  You should not take this medication if you are currently pregnant or planning a pregnancy or while breastfeeding.
Acitretin Counseling:  I discussed with the patient the risks of acitretin including but not limited to hair loss, dry lips/skin/eyes, liver damage, hyperlipidemia, depression/suicidal ideation, photosensitivity.  Serious rare side effects can include but are not limited to pancreatitis, pseudotumor cerebri, bony changes, clot formation/stroke/heart attack.  Patient understands that alcohol is contraindicated since it can result in liver toxicity and significantly prolong the elimination of the drug by many years.
Niacinamide Pregnancy And Lactation Text: These medications are considered safe during pregnancy.
Xeljanz Counseling: I discussed with the patient the risks of Xeljanz therapy including increased risk of infection, liver issues, headache, diarrhea, or cold symptoms. Live vaccines should be avoided. They were instructed to call if they have any problems.
Tazorac Pregnancy And Lactation Text: This medication is not safe during pregnancy. It is unknown if this medication is excreted in breast milk.
Minoxidil Counseling: Minoxidil is a topical medication which can increase blood flow where it is applied. It is uncertain how this medication increases hair growth. Side effects are uncommon and include stinging and allergic reactions.
Tazorac Counseling:  Patient advised that medication is irritating and drying.  Patient may need to apply sparingly and wash off after an hour before eventually leaving it on overnight.  The patient verbalized understanding of the proper use and possible adverse effects of tazorac.  All of the patient's questions and concerns were addressed.
Olumiant Counseling: I discussed with the patient the risks of Olumiant therapy including but not limited to upper respiratory tract infections, shingles, cold sores, and nausea. Live vaccines should be avoided.  This medication has been linked to serious infections; higher rate of mortality; malignancy and lymphoproliferative disorders; major adverse cardiovascular events; thrombosis; gastrointestinal perforations; neutropenia; lymphopenia; anemia; liver enzyme elevations; and lipid elevations.
Cimzia Counseling:  I discussed with the patient the risks of Cimzia including but not limited to immunosuppression, allergic reactions and infections.  The patient understands that monitoring is required including a PPD at baseline and must alert us or the primary physician if symptoms of infection or other concerning signs are noted.
Nsaids Counseling: NSAID Counseling: I discussed with the patient that NSAIDs should be taken with food. Prolonged use of NSAIDs can result in the development of stomach ulcers.  Patient advised to stop taking NSAIDs if abdominal pain occurs.  The patient verbalized understanding of the proper use and possible adverse effects of NSAIDs.  All of the patient's questions and concerns were addressed.
Cimetidine Counseling:  I discussed with the patient the risks of Cimetidine including but not limited to gynecomastia, headache, diarrhea, nausea, drowsiness, arrhythmias, pancreatitis, skin rashes, psychosis, bone marrow suppression and kidney toxicity.
Calcipotriene Counseling:  I discussed with the patient the risks of calcipotriene including but not limited to erythema, scaling, itching, and irritation.
Thalidomide Counseling: I discussed with the patient the risks of thalidomide including but not limited to birth defects, anxiety, weakness, chest pain, dizziness, cough and severe allergy.
Azithromycin Counseling:  I discussed with the patient the risks of azithromycin including but not limited to GI upset, allergic reaction, drug rash, diarrhea, and yeast infections.
Topical Clindamycin Counseling: Patient counseled that this medication may cause skin irritation or allergic reactions.  In the event of skin irritation, the patient was advised to reduce the amount of the drug applied or use it less frequently.   The patient verbalized understanding of the proper use and possible adverse effects of clindamycin.  All of the patient's questions and concerns were addressed.
Xelwilberz Pregnancy And Lactation Text: This medication is Pregnancy Category D and is not considered safe during pregnancy.  The risk during breast feeding is also uncertain.
Rifampin Counseling: I discussed with the patient the risks of rifampin including but not limited to liver damage, kidney damage, red-orange body fluids, nausea/vomiting and severe allergy.
Rifampin Pregnancy And Lactation Text: This medication is Pregnancy Category C and it isn't know if it is safe during pregnancy. It is also excreted in breast milk and should not be used if you are breast feeding.
Calcipotriene Pregnancy And Lactation Text: This medication has not been proven safe during pregnancy. It is unknown if this medication is excreted in breast milk.
Olumiant Pregnancy And Lactation Text: Based on animal studies, Olumiant may cause embryo-fetal harm when administered to pregnant women.  The medication should not be used in pregnancy.  Breastfeeding is not recommended during treatment.
Nsaids Pregnancy And Lactation Text: These medications are considered safe up to 30 weeks gestation. It is excreted in breast milk.
Cimzia Pregnancy And Lactation Text: This medication crosses the placenta but can be considered safe in certain situations. Cimzia may be excreted in breast milk.
Clofazimine Counseling:  I discussed with the patient the risks of clofazimine including but not limited to skin and eye pigmentation, liver damage, nausea/vomiting, gastrointestinal bleeding and allergy.
Acitretin Pregnancy And Lactation Text: This medication is Pregnancy Category X and should not be given to women who are pregnant or may become pregnant in the future. This medication is excreted in breast milk.
Cosentyx Counseling:  I discussed with the patient the risks of Cosentyx including but not limited to worsening of Crohn's disease, immunosuppression, allergic reactions and infections.  The patient understands that monitoring is required including a PPD at baseline and must alert us or the primary physician if symptoms of infection or other concerning signs are noted.
Xolair Counseling:  Patient informed of potential adverse effects including but not limited to fever, muscle aches, rash and allergic reactions.  The patient verbalized understanding of the proper use and possible adverse effects of Xolair.  All of the patient's questions and concerns were addressed.
Azithromycin Pregnancy And Lactation Text: This medication is considered safe during pregnancy and is also secreted in breast milk.
Tranexamic Acid Counseling:  Patient advised of the small risk of bleeding problems with tranexamic acid. They were also instructed to call if they developed any nausea, vomiting or diarrhea. All of the patient's questions and concerns were addressed.
Bactrim Counseling:  I discussed with the patient the risks of sulfa antibiotics including but not limited to GI upset, allergic reaction, drug rash, diarrhea, dizziness, photosensitivity, and yeast infections.  Rarely, more serious reactions can occur including but not limited to aplastic anemia, agranulocytosis, methemoglobinemia, blood dyscrasias, liver or kidney failure, lung infiltrates or desquamative/blistering drug rashes.
Rinvoq Counseling: I discussed with the patient the risks of Rinvoq therapy including but not limited to upper respiratory tract infections, shingles, cold sores, bronchitis, nausea, cough, fever, acne, and headache. Live vaccines should be avoided.  This medication has been linked to serious infections; higher rate of mortality; malignancy and lymphoproliferative disorders; major adverse cardiovascular events; thrombosis; thrombocytopenia, anemia, and neutropenia; lipid elevations; liver enzyme elevations; and gastrointestinal perforations.
Mirvaso Counseling: Mirvaso is a topical medication which can decrease superficial blood flow where applied. Side effects are uncommon and include stinging, redness and allergic reactions.
Doxepin Counseling:  Patient advised that the medication is sedating and not to drive a car after taking this medication. Patient informed of potential adverse effects including but not limited to dry mouth, urinary retention, and blurry vision.  The patient verbalized understanding of the proper use and possible adverse effects of doxepin.  All of the patient's questions and concerns were addressed.
Bexarotene Counseling:  I discussed with the patient the risks of bexarotene including but not limited to hair loss, dry lips/skin/eyes, liver abnormalities, hyperlipidemia, pancreatitis, depression/suicidal ideation, photosensitivity, drug rash/allergic reactions, hypothyroidism, anemia, leukopenia, infection, cataracts, and teratogenicity.  Patient understands that they will need regular blood tests to check lipid profile, liver function tests, white blood cell count, thyroid function tests and pregnancy test if applicable.
Sarecycline Counseling: Patient advised regarding possible photosensitivity and discoloration of the teeth, skin, lips, tongue and gums.  Patient instructed to avoid sunlight, if possible.  When exposed to sunlight, patients should wear protective clothing, sunglasses, and sunscreen.  The patient was instructed to call the office immediately if the following severe adverse effects occur:  hearing changes, easy bruising/bleeding, severe headache, or vision changes.  The patient verbalized understanding of the proper use and possible adverse effects of sarecycline.  All of the patient's questions and concerns were addressed.
Odomzo Counseling- I discussed with the patient the risks of Odomzo including but not limited to nausea, vomiting, diarrhea, constipation, weight loss, changes in the sense of taste, decreased appetite, muscle spasms, and hair loss.  The patient verbalized understanding of the proper use and possible adverse effects of Odomzo.  All of the patient's questions and concerns were addressed.
Opzelura Counseling:  I discussed with the patient the risks of Opzelura including but not limited to nasopharngitis, bronchitis, ear infection, eosinophila, hives, diarrhea, folliculitis, tonsillitis, and rhinorrhea.  Taken orally, this medication has been linked to serious infections; higher rate of mortality; malignancy and lymphoproliferative disorders; major adverse cardiovascular events; thrombosis; thrombocytopenia, anemia, and neutropenia; and lipid elevations.
Tranexamic Acid Pregnancy And Lactation Text: It is unknown if this medication is safe during pregnancy or breast feeding.
Isotretinoin Counseling: Patient should get monthly blood tests, not donate blood, not drive at night if vision affected, not share medication, and not undergo elective surgery for 6 months after tx completed. Side effects reviewed, pt to contact office should one occur.
Colchicine Counseling:  Patient counseled regarding adverse effects including but not limited to stomach upset (nausea, vomiting, stomach pain, or diarrhea).  Patient instructed to limit alcohol consumption while taking this medication.  Colchicine may reduce blood counts especially with prolonged use.  The patient understands that monitoring of kidney function and blood counts may be required, especially at baseline. The patient verbalized understanding of the proper use and possible adverse effects of colchicine.  All of the patient's questions and concerns were addressed.
Bexarotene Pregnancy And Lactation Text: This medication is Pregnancy Category X and should not be given to women who are pregnant or may become pregnant. This medication should not be used if you are breast feeding.
Cantharidin Pregnancy And Lactation Text: The use of this medication during pregnancy or lactation is not recommended as there is insufficient data.
Valtrex Counseling: I discussed with the patient the risks of valacyclovir including but not limited to kidney damage, nausea, vomiting and severe allergy.  The patient understands that if the infection seems to be worsening or is not improving, they are to call.
Rinvoq Pregnancy And Lactation Text: Based on animal studies, Rinvoq may cause embryo-fetal harm when administered to pregnant women.  The medication should not be used in pregnancy.  Breastfeeding is not recommended during treatment and for 6 days after the last dose.
Bactrim Pregnancy And Lactation Text: This medication is Pregnancy Category D and is known to cause fetal risk.  It is also excreted in breast milk.
Topical Ketoconazole Counseling: Patient counseled that this medication may cause skin irritation or allergic reactions.  In the event of skin irritation, the patient was advised to reduce the amount of the drug applied or use it less frequently.   The patient verbalized understanding of the proper use and possible adverse effects of ketoconazole.  All of the patient's questions and concerns were addressed.
Doxepin Pregnancy And Lactation Text: This medication is Pregnancy Category C and it isn't known if it is safe during pregnancy. It is also excreted in breast milk and breast feeding isn't recommended.
Xolair Pregnancy And Lactation Text: This medication is Pregnancy Category B and is considered safe during pregnancy. This medication is excreted in breast milk.
Hydroxyzine Counseling: Patient advised that the medication is sedating and not to drive a car after taking this medication.  Patient informed of potential adverse effects including but not limited to dry mouth, urinary retention, and blurry vision.  The patient verbalized understanding of the proper use and possible adverse effects of hydroxyzine.  All of the patient's questions and concerns were addressed.
Opzelura Pregnancy And Lactation Text: There is insufficient data to evaluate drug-associated risk for major birth defects, miscarriage, or other adverse maternal or fetal outcomes.  There is a pregnancy registry that monitors pregnancy outcomes in pregnant persons exposed to the medication during pregnancy.  It is unknown if this medication is excreted in breast milk.  Do not breastfeed during treatment and for about 4 weeks after the last dose.
Oral Minoxidil Counseling- I discussed with the patient the risks of oral minoxidil including but not limited to shortness of breath, swelling of the feet or ankles, dizziness, lightheadedness, unwanted hair growth and allergic reaction.  The patient verbalized understanding of the proper use and possible adverse effects of oral minoxidil.  All of the patient's questions and concerns were addressed.
Dapsone Counseling: I discussed with the patient the risks of dapsone including but not limited to hemolytic anemia, agranulocytosis, rashes, methemoglobinemia, kidney failure, peripheral neuropathy, headaches, GI upset, and liver toxicity.  Patients who start dapsone require monitoring including baseline LFTs and weekly CBCs for the first month, then every month thereafter.  The patient verbalized understanding of the proper use and possible adverse effects of dapsone.  All of the patient's questions and concerns were addressed.
Dupixent Counseling: I discussed with the patient the risks of dupilumab including but not limited to eye infection and irritation, cold sores, injection site reactions, worsening of asthma, allergic reactions and increased risk of parasitic infection.  Live vaccines should be avoided while taking dupilumab. Dupilumab will also interact with certain medications such as warfarin and cyclosporine. The patient understands that monitoring is required and they must alert us or the primary physician if symptoms of infection or other concerning signs are noted.
5-Fu Counseling: 5-Fluorouracil Counseling:  I discussed with the patient the risks of 5-fluorouracil including but not limited to erythema, scaling, itching, weeping, crusting, and pain.
Dupixent Pregnancy And Lactation Text: This medication likely crosses the placenta but the risk for the fetus is uncertain. This medication is excreted in breast milk.
Oral Minoxidil Pregnancy And Lactation Text: This medication should only be used when clearly needed if you are pregnant, attempting to become pregnant or breast feeding.
Tetracycline Counseling: Patient counseled regarding possible photosensitivity and increased risk for sunburn.  Patient instructed to avoid sunlight, if possible.  When exposed to sunlight, patients should wear protective clothing, sunglasses, and sunscreen.  The patient was instructed to call the office immediately if the following severe adverse effects occur:  hearing changes, easy bruising/bleeding, severe headache, or vision changes.  The patient verbalized understanding of the proper use and possible adverse effects of tetracycline.  All of the patient's questions and concerns were addressed. Patient understands to avoid pregnancy while on therapy due to potential birth defects.
Cephalexin Counseling: I counseled the patient regarding use of cephalexin as an antibiotic for prophylactic and/or therapeutic purposes. Cephalexin (commonly prescribed under brand name Keflex) is a cephalosporin antibiotic which is active against numerous classes of bacteria, including most skin bacteria. Side effects may include nausea, diarrhea, gastrointestinal upset, rash, hives, yeast infections, and in rare cases, hepatitis, kidney disease, seizures, fever, confusion, neurologic symptoms, and others. Patients with severe allergies to penicillin medications are cautioned that there is about a 10% incidence of cross-reactivity with cephalosporins. When possible, patients with penicillin allergies should use alternatives to cephalosporins for antibiotic therapy.
Rituxan Counseling:  I discussed with the patient the risks of Rituxan infusions. Side effects can include infusion reactions, severe drug rashes including mucocutaneous reactions, reactivation of latent hepatitis and other infections and rarely progressive multifocal leukoencephalopathy.  All of the patient's questions and concerns were addressed.
Cephalexin Pregnancy And Lactation Text: This medication is Pregnancy Category B and considered safe during pregnancy.  It is also excreted in breast milk but can be used safely for shorter doses.
Rituxan Pregnancy And Lactation Text: This medication is Pregnancy Category C and it isn't know if it is safe during pregnancy. It is unknown if this medication is excreted in breast milk but similar antibodies are known to be excreted.
Drysol Counseling:  I discussed with the patient the risks of drysol/aluminum chloride including but not limited to skin rash, itching, irritation, burning.
Hydroxyzine Pregnancy And Lactation Text: This medication is not safe during pregnancy and should not be taken. It is also excreted in breast milk and breast feeding isn't recommended.
Dapsone Pregnancy And Lactation Text: This medication is Pregnancy Category C and is not considered safe during pregnancy or breast feeding.
Topical Sulfur Applications Pregnancy And Lactation Text: This medication is Pregnancy Category C and has an unknown safety profile during pregnancy. It is unknown if this topical medication is excreted in breast milk.
Valtrex Pregnancy And Lactation Text: this medication is Pregnancy Category B and is considered safe during pregnancy. This medication is not directly found in breast milk but it's metabolite acyclovir is present.
Topical Sulfur Applications Counseling: Topical Sulfur Counseling: Patient counseled that this medication may cause skin irritation or allergic reactions.  In the event of skin irritation, the patient was advised to reduce the amount of the drug applied or use it less frequently.   The patient verbalized understanding of the proper use and possible adverse effects of topical sulfur application.  All of the patient's questions and concerns were addressed.
Azathioprine Counseling:  I discussed with the patient the risks of azathioprine including but not limited to myelosuppression, immunosuppression, hepatotoxicity, lymphoma, and infections.  The patient understands that monitoring is required including baseline LFTs, Creatinine, possible TPMP genotyping and weekly CBCs for the first month and then every 2 weeks thereafter.  The patient verbalized understanding of the proper use and possible adverse effects of azathioprine.  All of the patient's questions and concerns were addressed.
Isotretinoin Pregnancy And Lactation Text: This medication is Pregnancy Category X and is considered extremely dangerous during pregnancy. It is unknown if it is excreted in breast milk.
Picato Counseling:  I discussed with the patient the risks of Picato including but not limited to erythema, scaling, itching, weeping, crusting, and pain.
Siliq Counseling:  I discussed with the patient the risks of Siliq including but not limited to new or worsening depression, suicidal thoughts and behavior, immunosuppression, malignancy, posterior leukoencephalopathy syndrome, and serious infections.  The patient understands that monitoring is required including a PPD at baseline and must alert us or the primary physician if symptoms of infection or other concerning signs are noted. There is also a special program designed to monitor depression which is required with Siliq.
Dutasteride Male Counseling: Dustasteride Counseling:  I discussed with the patient the risks of use of dutasteride including but not limited to decreased libido, decreased ejaculate volume, and gynecomastia. Women who can become pregnant should not handle medication.  All of the patient's questions and concerns were addressed.
Finasteride Male Counseling: Finasteride Counseling:  I discussed with the patient the risks of use of finasteride including but not limited to decreased libido, decreased ejaculate volume, gynecomastia, and depression. Women should not handle medication.  All of the patient's questions and concerns were addressed.
Otezla Counseling: The side effects of Otezla were discussed with the patient, including but not limited to worsening or new depression, weight loss, diarrhea, nausea, upper respiratory tract infection, and headache. Patient instructed to call the office should any adverse effect occur.  The patient verbalized understanding of the proper use and possible adverse effects of Otezla.  All the patient's questions and concerns were addressed.
High Dose Vitamin A Pregnancy And Lactation Text: High dose vitamin A therapy is contraindicated during pregnancy and breast feeding.
High Dose Vitamin A Counseling: Side effects reviewed, pt to contact office should one occur.
Finasteride Pregnancy And Lactation Text: This medication is absolutely contraindicated during pregnancy. It is unknown if it is excreted in breast milk.
Albendazole Counseling:  I discussed with the patient the risks of albendazole including but not limited to cytopenia, kidney damage, nausea/vomiting and severe allergy.  The patient understands that this medication is being used in an off-label manner.
Clindamycin Counseling: I counseled the patient regarding use of clindamycin as an antibiotic for prophylactic and/or therapeutic purposes. Clindamycin is active against numerous classes of bacteria, including skin bacteria. Side effects may include nausea, diarrhea, gastrointestinal upset, rash, hives, yeast infections, and in rare cases, colitis.

## 2022-06-10 NOTE — PROCEDURE: ADDITIONAL NOTES
Render Risk Assessment In Note?: no
Additional Notes: Continue use of hydroquinone. Denies refill today.
Detail Level: Detailed
Additional Notes: Pt seeing Jeana for laser to help with redness.

## 2022-06-10 NOTE — PROCEDURE: PULSED-DYE LASER
Cryogen Time (Ms): 30
Consent: Written consent obtained, risks reviewed including but not limited to crusting, scabbing, blistering, scarring, darker or lighter pigmentary change, incidental hair removal, bruising, and/or incomplete removal.
Pulse Duration: 10 ms
Pulse Duration: 3 ms
Post-Care Instructions: I reviewed with the patient in detail post-care instructions. Patient should stay away from the sun and wear sun protection until treated areas are fully healed.
Spot Size: 7 mm
Detail Level: Simple
Price (Use Numbers Only, No Special Characters Or $): 75
Post-Procedure Care: Sunscreen
Delay Time (Ms): 20
Fluence In J/Cm2 (Optional): 8
Treated Area: small area
Pulse Duration: 6 ms
Fluence In J/Cm2 (Optional): 11.0
Laser Type: Vbeam 595nm

## 2022-06-10 NOTE — PROCEDURE: ADDITIONAL NOTES
Detail Level: Simple
Render Risk Assessment In Note?: no
Additional Notes: Pt is here because she has a keloid on her wrist, chest and pants. She is a Mayra Westfall patient. Mayra has biopsied it, and injected it a few times. She is mostly concerned with the red color of it versus the texture. Miriam explained the vbeam, if the vbeam will get warmed up we will treat today.\\nShe also expressed concerns for keeping her melasma and brown spots at bay. [facility] for her face $600 for one treatment, Miriam doesn’t think she needs a series just get a treatment every few months. No history of cold sores.\\nShe will spot treat the keloid with vbeam today.

## 2022-06-14 ENCOUNTER — HOSPITAL ENCOUNTER (OUTPATIENT)
Facility: MEDICAL CENTER | Age: 42
End: 2022-06-14
Attending: NURSE PRACTITIONER
Payer: COMMERCIAL

## 2022-06-14 ENCOUNTER — OFFICE VISIT (OUTPATIENT)
Dept: URGENT CARE | Facility: CLINIC | Age: 42
End: 2022-06-14
Payer: COMMERCIAL

## 2022-06-14 ENCOUNTER — TELEPHONE (OUTPATIENT)
Dept: URGENT CARE | Facility: CLINIC | Age: 42
End: 2022-06-14

## 2022-06-14 VITALS
RESPIRATION RATE: 18 BRPM | WEIGHT: 160 LBS | OXYGEN SATURATION: 98 % | TEMPERATURE: 97.2 F | HEART RATE: 116 BPM | HEIGHT: 62 IN | DIASTOLIC BLOOD PRESSURE: 60 MMHG | BODY MASS INDEX: 29.44 KG/M2 | SYSTOLIC BLOOD PRESSURE: 114 MMHG

## 2022-06-14 DIAGNOSIS — U07.1 COVID-19: ICD-10-CM

## 2022-06-14 DIAGNOSIS — J02.9 PHARYNGITIS, UNSPECIFIED ETIOLOGY: ICD-10-CM

## 2022-06-14 LAB
EXTERNAL QUALITY CONTROL: ABNORMAL
INT CON NEG: NORMAL
INT CON POS: NORMAL
S PYO AG THROAT QL: NEGATIVE
SARS-COV+SARS-COV-2 AG RESP QL IA.RAPID: POSITIVE

## 2022-06-14 PROCEDURE — 87880 STREP A ASSAY W/OPTIC: CPT | Performed by: NURSE PRACTITIONER

## 2022-06-14 PROCEDURE — 99213 OFFICE O/P EST LOW 20 MIN: CPT | Mod: CS | Performed by: NURSE PRACTITIONER

## 2022-06-14 PROCEDURE — 0240U HCHG SARS-COV-2 COVID-19 NFCT DS RESP RNA 3 TRGT MIC: CPT

## 2022-06-14 PROCEDURE — 87426 SARSCOV CORONAVIRUS AG IA: CPT | Performed by: NURSE PRACTITIONER

## 2022-06-14 RX ORDER — ADAPALENE GEL USP, 0.3% 3 MG/G
GEL TOPICAL
COMMUNITY
Start: 2022-06-10

## 2022-06-14 RX ORDER — ONDANSETRON 4 MG/1
TABLET, FILM COATED ORAL
COMMUNITY
Start: 2022-06-09 | End: 2023-05-01

## 2022-06-14 RX ORDER — SYRINGE WITH NEEDLE, 1 ML 25GX5/8"
SYRINGE, EMPTY DISPOSABLE MISCELLANEOUS
COMMUNITY
Start: 2022-03-31

## 2022-06-14 RX ORDER — OXYCODONE HYDROCHLORIDE AND ACETAMINOPHEN 5; 325 MG/1; MG/1
TABLET ORAL
COMMUNITY
Start: 2022-06-09

## 2022-06-14 ASSESSMENT — ENCOUNTER SYMPTOMS
SORE THROAT: 1
HEADACHES: 1
COUGH: 1
DIARRHEA: 0
FEVER: 0
WHEEZING: 0
CHILLS: 1
VOMITING: 0
SHORTNESS OF BREATH: 0
NAUSEA: 0

## 2022-06-14 ASSESSMENT — FIBROSIS 4 INDEX: FIB4 SCORE: 0.48

## 2022-06-14 NOTE — PATIENT INSTRUCTIONS
-Warm salt water gargles.  -OTC Throat lozenges or spray (Cepacol).  -Tylenol as directed for pain and fever.  -Oral hydration and rest.   -Cough control: nonpharmacologic options for cough relief such as throat lozenges, hot tea, honey.  -Over the counter expectorant as directed; Guaifenesin (Mucinex).    Seek emergency medical care immediately for: Trouble breathing, persistent pain or pressure in the chest, confusion, inability to wake or stay awake, bluish lips or face, persistent tachycardia (fast heart rate), prolonged dizziness, persistent high grade fevers. Follow up for persistent throat pain, increased swelling, persistent fevers, difficulty swallowing, prolonged cough, persistent wheezing, leg swelling, or any other concerns. Follow up with your Primary Care Provider.

## 2022-06-14 NOTE — PROGRESS NOTES
Subjective:     Sara Taylor is a 41 y.o. female who presents for Sore Throat (X 1 day, feels on fire, ears feel plugged:achy, headache, did a Home Covid test today and last night: Negative)      Ear pressure. States it feels like her throat is on fire. Took Tylenol and Zicam. Had negative home COVID tests.     Pharyngitis   This is a new problem. The current episode started yesterday. Neither side of throat is experiencing more pain than the other. The pain is at a severity of 8/10. The pain is moderate. Associated symptoms include coughing and headaches. Pertinent negatives include no diarrhea, drooling, shortness of breath or vomiting.       Past Medical History:   Diagnosis Date   • ADD (attention deficit disorder with hyperactivity)    • Anemia     may be related to heavy menstruation   • Anesthesia     Post op nause   • Anxiety    • Chickenpox     as child   • Chronic constipation     takes smooth move tea every night   • Disorder of thyroid    • Gynecological disorder     heavy menstrual cycle-possible emdometriosis   • Heart burn     no current meds   • High cholesterol     no meds   • Indigestion     trying digestive enzymes & probiotic   • PTSD (post-traumatic stress disorder) 2004   • Right hip pain 06/03/2022       Past Surgical History:   Procedure Laterality Date   • MAMMOPLASTY AUGMENTATION  9/1/2010    Performed by GREGORIO NAVA at [facility]   • LIPOSUCTION  9/1/2010    Performed by GREGORIO NAVA at [facility]       Social History     Socioeconomic History   • Marital status:      Spouse name: Not on file   • Number of children: Not on file   • Years of education: Not on file   • Highest education level: Not on file   Occupational History   • Not on file   Tobacco Use   • Smoking status: Never Smoker   • Smokeless tobacco: Never Used   Vaping Use   • Vaping Use: Never used   Substance and Sexual Activity   • Alcohol use: Not Currently      "Alcohol/week: 2.0 oz     Types: 4 Standard drinks or equivalent per week   • Drug use: Not Currently   • Sexual activity: Yes     Partners: Male   Other Topics Concern   • Not on file   Social History Narrative   • Not on file     Social Determinants of Health     Financial Resource Strain: Not on file   Food Insecurity: Not on file   Transportation Needs: Not on file   Physical Activity: Not on file   Stress: Not on file   Social Connections: Not on file   Intimate Partner Violence: Not on file   Housing Stability: Not on file        Family History   Problem Relation Age of Onset   • Thyroid Mother    • Heart Disease Father    • Cancer Father 65        colon        No Known Allergies    Review of Systems   Constitutional: Positive for chills and malaise/fatigue. Negative for fever.   HENT: Positive for sore throat. Negative for drooling.    Respiratory: Positive for cough. Negative for shortness of breath and wheezing.    Gastrointestinal: Negative for diarrhea, nausea and vomiting.   Neurological: Positive for headaches.   Endo/Heme/Allergies: Positive for environmental allergies.   All other systems reviewed and are negative.       Objective:   /60 (BP Location: Left arm, Patient Position: Sitting, BP Cuff Size: Adult)   Pulse (!) 116   Temp 36.2 °C (97.2 °F) (Temporal)   Resp 18   Ht 1.575 m (5' 2\")   Wt 72.6 kg (160 lb)   LMP 05/27/2022 (Approximate)   SpO2 98%   Breastfeeding No   BMI 29.26 kg/m²     Physical Exam  Vitals reviewed.   Constitutional:       General: She is not in acute distress.     Appearance: She is well-developed. She is not toxic-appearing.   HENT:      Head: Normocephalic and atraumatic.      Right Ear: Ear canal and external ear normal. No drainage, swelling or tenderness. A middle ear effusion is present. Tympanic membrane is not injected, perforated or erythematous.      Left Ear: Ear canal and external ear normal. No drainage, swelling or tenderness. A middle ear effusion " is present. Tympanic membrane is not injected, perforated or erythematous.      Ears:      Comments: Clear effusion.      Nose: Mucosal edema present.      Mouth/Throat:      Lips: Pink.      Mouth: Mucous membranes are moist.      Pharynx: Posterior oropharyngeal erythema present. No pharyngeal swelling.      Tonsils: No tonsillar exudate.   Eyes:      Conjunctiva/sclera: Conjunctivae normal.   Cardiovascular:      Rate and Rhythm: Tachycardia present.   Pulmonary:      Effort: Pulmonary effort is normal. No respiratory distress.      Breath sounds: Normal breath sounds. No stridor. No wheezing, rhonchi or rales.   Musculoskeletal:         General: Normal range of motion.      Cervical back: Normal range of motion and neck supple.   Skin:     General: Skin is warm and dry.      Findings: No rash.   Neurological:      General: No focal deficit present.      Mental Status: She is alert and oriented to person, place, and time.      GCS: GCS eye subscore is 4. GCS verbal subscore is 5. GCS motor subscore is 6.   Psychiatric:         Mood and Affect: Mood normal.         Speech: Speech normal.         Behavior: Behavior normal.         Thought Content: Thought content normal.         Judgment: Judgment normal.         Assessment/Plan:   1. COVID-19  - CoV-2 and Flu A/B by PCR (24 hour In-House): Collect NP swab in VTM; Future  - POCT SARS-COV Antigen SUMAN (Symptomatic only)    2. Pharyngitis, unspecified etiology  - CoV-2 and Flu A/B by PCR (24 hour In-House): Collect NP swab in VTM; Future  - POCT Rapid Strep A  - POCT SARS-COV Antigen SUMAN (Symptomatic only)  Results for orders placed or performed in visit on 06/14/22   POCT Rapid Strep A   Result Value Ref Range    Rapid Strep Screen negative     Internal Control Positive Valid     Internal Control Negative Valid    POCT SARS-COV Antigen SUMAN (Symptomatic only)   Result Value Ref Range    Internal  Valid     SARS-COV ANTIGEN SUMAN Positive (A) Negative,  Indeterminate, None Detected, Valid, Invalid, Pass   -Warm salt water gargles.  -OTC Throat lozenges or spray (Cepacol).  -Tylenol as directed for pain and fever.  -Oral hydration and rest.   -Cough control: nonpharmacologic options for cough relief such as throat lozenges, hot tea, honey.  -Over the counter expectorant as directed; Guaifenesin (Mucinex).    Seek emergency medical care immediately for: Trouble breathing, persistent pain or pressure in the chest, confusion, inability to wake or stay awake, bluish lips or face, persistent tachycardia (fast heart rate), prolonged dizziness, persistent high grade fevers. Follow up for persistent throat pain, increased swelling, persistent fevers, difficulty swallowing, prolonged cough, persistent wheezing, leg swelling, or any other concerns. Follow up with your Primary Care Provider.     -Discussed viral etiology. COVID S&S, and self isolation guidelines. S&S of PNA with follow up. Stable Vitals.    Differential diagnosis, natural history, supportive care, and indications for immediate follow-up discussed.

## 2022-06-15 DIAGNOSIS — J02.9 PHARYNGITIS, UNSPECIFIED ETIOLOGY: ICD-10-CM

## 2022-06-15 DIAGNOSIS — U07.1 COVID-19: ICD-10-CM

## 2022-06-15 LAB
FLUAV RNA SPEC QL NAA+PROBE: NEGATIVE
FLUBV RNA SPEC QL NAA+PROBE: NEGATIVE
SARS-COV-2 RNA RESP QL NAA+PROBE: DETECTED
SPECIMEN SOURCE: ABNORMAL

## 2022-06-28 ENCOUNTER — PRE-ADMISSION TESTING (OUTPATIENT)
Dept: ADMISSIONS | Facility: MEDICAL CENTER | Age: 42
End: 2022-06-28
Attending: ORTHOPAEDIC SURGERY
Payer: COMMERCIAL

## 2022-06-28 DIAGNOSIS — Z01.812 PRE-OPERATIVE LABORATORY EXAMINATION: ICD-10-CM

## 2022-06-28 LAB
ANION GAP SERPL CALC-SCNC: 11 MMOL/L (ref 7–16)
BUN SERPL-MCNC: 9 MG/DL (ref 8–22)
CALCIUM SERPL-MCNC: 9.1 MG/DL (ref 8.4–10.2)
CHLORIDE SERPL-SCNC: 102 MMOL/L (ref 96–112)
CO2 SERPL-SCNC: 24 MMOL/L (ref 20–33)
CREAT SERPL-MCNC: 0.62 MG/DL (ref 0.5–1.4)
ERYTHROCYTE [DISTWIDTH] IN BLOOD BY AUTOMATED COUNT: 41.3 FL (ref 35.9–50)
GFR SERPLBLD CREATININE-BSD FMLA CKD-EPI: 115 ML/MIN/1.73 M 2
GLUCOSE SERPL-MCNC: 100 MG/DL (ref 65–99)
HCT VFR BLD AUTO: 44.2 % (ref 37–47)
HGB BLD-MCNC: 14.9 G/DL (ref 12–16)
MCH RBC QN AUTO: 31.2 PG (ref 27–33)
MCHC RBC AUTO-ENTMCNC: 33.7 G/DL (ref 33.6–35)
MCV RBC AUTO: 92.5 FL (ref 81.4–97.8)
PLATELET # BLD AUTO: 303 K/UL (ref 164–446)
PMV BLD AUTO: 10.6 FL (ref 9–12.9)
POTASSIUM SERPL-SCNC: 3.7 MMOL/L (ref 3.6–5.5)
RBC # BLD AUTO: 4.78 M/UL (ref 4.2–5.4)
SCCMEC + MECA PNL NOSE NAA+PROBE: NEGATIVE
SCCMEC + MECA PNL NOSE NAA+PROBE: NEGATIVE
SODIUM SERPL-SCNC: 137 MMOL/L (ref 135–145)
WBC # BLD AUTO: 10 K/UL (ref 4.8–10.8)

## 2022-06-28 PROCEDURE — 87641 MR-STAPH DNA AMP PROBE: CPT

## 2022-06-28 PROCEDURE — 85027 COMPLETE CBC AUTOMATED: CPT

## 2022-06-28 PROCEDURE — 80048 BASIC METABOLIC PNL TOTAL CA: CPT

## 2022-06-28 PROCEDURE — 87640 STAPH A DNA AMP PROBE: CPT

## 2022-06-28 PROCEDURE — 36415 COLL VENOUS BLD VENIPUNCTURE: CPT

## 2022-06-28 RX ORDER — DEXTROAMPHETAMINE SACCHARATE, AMPHETAMINE ASPARTATE MONOHYDRATE, DEXTROAMPHETAMINE SULFATE AND AMPHETAMINE SULFATE 5; 5; 5; 5 MG/1; MG/1; MG/1; MG/1
40 CAPSULE, EXTENDED RELEASE ORAL EVERY MORNING
COMMUNITY
Start: 2022-06-21 | End: 2023-04-26 | Stop reason: SDUPTHER

## 2022-06-28 NOTE — PREPROCEDURE INSTRUCTIONS
Called pt to schedule PAT appt. Note pt tested POSITIVE FOR COVID 6/14/22. Pt states she was seen in urgent care for symptoms starting 6/13/22. Symptoms of sore throat, plugged ears, achy, headache. States her home covid test had been negative. Pt states her symptoms resolved 6/17/22. States Dr Galloway was aware.  Surgery for MICHELLE was rescheduled from 6/20/22 to 7/11/22. Confirmed with Dr Galloway's office (spoke with Lupe) that he is aware of positive covid on pt.     Pre-admit appointment completed.  Pt instructed to continue regularly prescribed medications through the day before surgery. Pt instructed to take the following medications the day of surgery with a sip of water, per anesthesia protocol; synthroid, and if needed-tylenol, flexeril.     MET's=>4.    Pt denies any pain, burning, or frequency with urination.    PT voices understanding to call Dr Galloway's office if any symptoms or other covid symptoms return.

## 2022-07-10 ENCOUNTER — ANESTHESIA EVENT (OUTPATIENT)
Dept: SURGERY | Facility: MEDICAL CENTER | Age: 42
End: 2022-07-10
Payer: COMMERCIAL

## 2022-07-11 ENCOUNTER — ANESTHESIA (OUTPATIENT)
Dept: SURGERY | Facility: MEDICAL CENTER | Age: 42
End: 2022-07-11
Payer: COMMERCIAL

## 2022-07-11 ENCOUNTER — APPOINTMENT (OUTPATIENT)
Dept: RADIOLOGY | Facility: MEDICAL CENTER | Age: 42
End: 2022-07-11
Attending: ORTHOPAEDIC SURGERY
Payer: COMMERCIAL

## 2022-07-11 ENCOUNTER — HOSPITAL ENCOUNTER (OUTPATIENT)
Facility: MEDICAL CENTER | Age: 42
End: 2022-07-11
Attending: ORTHOPAEDIC SURGERY | Admitting: ORTHOPAEDIC SURGERY
Payer: COMMERCIAL

## 2022-07-11 VITALS
HEIGHT: 62 IN | OXYGEN SATURATION: 100 % | BODY MASS INDEX: 29.44 KG/M2 | RESPIRATION RATE: 16 BRPM | WEIGHT: 160 LBS | SYSTOLIC BLOOD PRESSURE: 102 MMHG | DIASTOLIC BLOOD PRESSURE: 57 MMHG | TEMPERATURE: 97.4 F | HEART RATE: 65 BPM

## 2022-07-11 DIAGNOSIS — H02.9 EYELID DISORDER: ICD-10-CM

## 2022-07-11 PROBLEM — M16.10 ARTHRITIS OF HIP: Status: ACTIVE | Noted: 2022-07-11

## 2022-07-11 LAB
ABO + RH BLD: NORMAL
ABO GROUP BLD: NORMAL
BLD GP AB SCN SERPL QL: NORMAL
HCG UR QL: NEGATIVE
RH BLD: NORMAL

## 2022-07-11 PROCEDURE — 700111 HCHG RX REV CODE 636 W/ 250 OVERRIDE (IP): Performed by: ORTHOPAEDIC SURGERY

## 2022-07-11 PROCEDURE — 81025 URINE PREGNANCY TEST: CPT

## 2022-07-11 PROCEDURE — C1713 ANCHOR/SCREW BN/BN,TIS/BN: HCPCS | Performed by: ORTHOPAEDIC SURGERY

## 2022-07-11 PROCEDURE — 700111 HCHG RX REV CODE 636 W/ 250 OVERRIDE (IP): Performed by: STUDENT IN AN ORGANIZED HEALTH CARE EDUCATION/TRAINING PROGRAM

## 2022-07-11 PROCEDURE — 160031 HCHG SURGERY MINUTES - 1ST 30 MINS LEVEL 5: Performed by: ORTHOPAEDIC SURGERY

## 2022-07-11 PROCEDURE — 01214 ANES OPEN PX TOT HIP ARTHRP: CPT | Performed by: STUDENT IN AN ORGANIZED HEALTH CARE EDUCATION/TRAINING PROGRAM

## 2022-07-11 PROCEDURE — 700102 HCHG RX REV CODE 250 W/ 637 OVERRIDE(OP): Performed by: ORTHOPAEDIC SURGERY

## 2022-07-11 PROCEDURE — G0378 HOSPITAL OBSERVATION PER HR: HCPCS

## 2022-07-11 PROCEDURE — 160042 HCHG SURGERY MINUTES - EA ADDL 1 MIN LEVEL 5: Performed by: ORTHOPAEDIC SURGERY

## 2022-07-11 PROCEDURE — A9270 NON-COVERED ITEM OR SERVICE: HCPCS | Performed by: ORTHOPAEDIC SURGERY

## 2022-07-11 PROCEDURE — 36415 COLL VENOUS BLD VENIPUNCTURE: CPT

## 2022-07-11 PROCEDURE — 86850 RBC ANTIBODY SCREEN: CPT

## 2022-07-11 PROCEDURE — 700105 HCHG RX REV CODE 258: Performed by: ORTHOPAEDIC SURGERY

## 2022-07-11 PROCEDURE — 700102 HCHG RX REV CODE 250 W/ 637 OVERRIDE(OP): Performed by: STUDENT IN AN ORGANIZED HEALTH CARE EDUCATION/TRAINING PROGRAM

## 2022-07-11 PROCEDURE — 86900 BLOOD TYPING SEROLOGIC ABO: CPT

## 2022-07-11 PROCEDURE — 160002 HCHG RECOVERY MINUTES (STAT): Performed by: ORTHOPAEDIC SURGERY

## 2022-07-11 PROCEDURE — 96374 THER/PROPH/DIAG INJ IV PUSH: CPT

## 2022-07-11 PROCEDURE — 86901 BLOOD TYPING SEROLOGIC RH(D): CPT

## 2022-07-11 PROCEDURE — 160035 HCHG PACU - 1ST 60 MINS PHASE I: Performed by: ORTHOPAEDIC SURGERY

## 2022-07-11 PROCEDURE — 97161 PT EVAL LOW COMPLEX 20 MIN: CPT

## 2022-07-11 PROCEDURE — 94760 N-INVAS EAR/PLS OXIMETRY 1: CPT

## 2022-07-11 PROCEDURE — 700101 HCHG RX REV CODE 250: Performed by: STUDENT IN AN ORGANIZED HEALTH CARE EDUCATION/TRAINING PROGRAM

## 2022-07-11 PROCEDURE — 160048 HCHG OR STATISTICAL LEVEL 1-5: Performed by: ORTHOPAEDIC SURGERY

## 2022-07-11 PROCEDURE — 160036 HCHG PACU - EA ADDL 30 MINS PHASE I: Performed by: ORTHOPAEDIC SURGERY

## 2022-07-11 PROCEDURE — C1776 JOINT DEVICE (IMPLANTABLE): HCPCS | Performed by: ORTHOPAEDIC SURGERY

## 2022-07-11 PROCEDURE — 700101 HCHG RX REV CODE 250: Performed by: ORTHOPAEDIC SURGERY

## 2022-07-11 PROCEDURE — 97110 THERAPEUTIC EXERCISES: CPT

## 2022-07-11 PROCEDURE — 73502 X-RAY EXAM HIP UNI 2-3 VIEWS: CPT | Mod: RT

## 2022-07-11 PROCEDURE — A9270 NON-COVERED ITEM OR SERVICE: HCPCS | Performed by: STUDENT IN AN ORGANIZED HEALTH CARE EDUCATION/TRAINING PROGRAM

## 2022-07-11 PROCEDURE — 160009 HCHG ANES TIME/MIN: Performed by: ORTHOPAEDIC SURGERY

## 2022-07-11 PROCEDURE — 502000 HCHG MISC OR IMPLANTS RC 0278: Performed by: ORTHOPAEDIC SURGERY

## 2022-07-11 DEVICE — IMPLANTABLE DEVICE: Type: IMPLANTABLE DEVICE | Site: HIP | Status: FUNCTIONAL

## 2022-07-11 RX ORDER — OXYCODONE HCL 5 MG/5 ML
10 SOLUTION, ORAL ORAL
Status: COMPLETED | OUTPATIENT
Start: 2022-07-11 | End: 2022-07-11

## 2022-07-11 RX ORDER — ROCURONIUM BROMIDE 10 MG/ML
INJECTION, SOLUTION INTRAVENOUS PRN
Status: DISCONTINUED | OUTPATIENT
Start: 2022-07-11 | End: 2022-07-11 | Stop reason: SURG

## 2022-07-11 RX ORDER — DEXTROAMPHETAMINE SACCHARATE, AMPHETAMINE ASPARTATE, DEXTROAMPHETAMINE SULFATE AND AMPHETAMINE SULFATE 2.5; 2.5; 2.5; 2.5 MG/1; MG/1; MG/1; MG/1
20 TABLET ORAL 2 TIMES DAILY
Status: DISCONTINUED | OUTPATIENT
Start: 2022-07-12 | End: 2022-07-11 | Stop reason: HOSPADM

## 2022-07-11 RX ORDER — HALOPERIDOL 5 MG/ML
1 INJECTION INTRAMUSCULAR
Status: DISCONTINUED | OUTPATIENT
Start: 2022-07-11 | End: 2022-07-11 | Stop reason: HOSPADM

## 2022-07-11 RX ORDER — ENEMA 19; 7 G/133ML; G/133ML
1 ENEMA RECTAL
Status: DISCONTINUED | OUTPATIENT
Start: 2022-07-11 | End: 2022-07-11 | Stop reason: HOSPADM

## 2022-07-11 RX ORDER — SODIUM CHLORIDE, SODIUM LACTATE, POTASSIUM CHLORIDE, CALCIUM CHLORIDE 600; 310; 30; 20 MG/100ML; MG/100ML; MG/100ML; MG/100ML
INJECTION, SOLUTION INTRAVENOUS CONTINUOUS
Status: ACTIVE | OUTPATIENT
Start: 2022-07-11 | End: 2022-07-11

## 2022-07-11 RX ORDER — HYDROMORPHONE HYDROCHLORIDE 2 MG/ML
INJECTION, SOLUTION INTRAMUSCULAR; INTRAVENOUS; SUBCUTANEOUS PRN
Status: DISCONTINUED | OUTPATIENT
Start: 2022-07-11 | End: 2022-07-11 | Stop reason: SURG

## 2022-07-11 RX ORDER — ROPIVACAINE HYDROCHLORIDE 5 MG/ML
INJECTION, SOLUTION EPIDURAL; INFILTRATION; PERINEURAL
Status: DISCONTINUED | OUTPATIENT
Start: 2022-07-11 | End: 2022-07-11 | Stop reason: HOSPADM

## 2022-07-11 RX ORDER — LIDOCAINE HYDROCHLORIDE 20 MG/ML
INJECTION, SOLUTION EPIDURAL; INFILTRATION; INTRACAUDAL; PERINEURAL PRN
Status: DISCONTINUED | OUTPATIENT
Start: 2022-07-11 | End: 2022-07-11 | Stop reason: SURG

## 2022-07-11 RX ORDER — ACETAMINOPHEN 500 MG
1000 TABLET ORAL EVERY 6 HOURS PRN
Status: DISCONTINUED | OUTPATIENT
Start: 2022-07-16 | End: 2022-07-11 | Stop reason: HOSPADM

## 2022-07-11 RX ORDER — SODIUM CHLORIDE 9 MG/ML
INJECTION, SOLUTION INTRAMUSCULAR; INTRAVENOUS; SUBCUTANEOUS
Status: DISCONTINUED | OUTPATIENT
Start: 2022-07-11 | End: 2022-07-11 | Stop reason: HOSPADM

## 2022-07-11 RX ORDER — DEXAMETHASONE SODIUM PHOSPHATE 4 MG/ML
4 INJECTION, SOLUTION INTRA-ARTICULAR; INTRALESIONAL; INTRAMUSCULAR; INTRAVENOUS; SOFT TISSUE
Status: DISCONTINUED | OUTPATIENT
Start: 2022-07-11 | End: 2022-07-11 | Stop reason: HOSPADM

## 2022-07-11 RX ORDER — KETOROLAC TROMETHAMINE 30 MG/ML
INJECTION, SOLUTION INTRAMUSCULAR; INTRAVENOUS
Status: DISCONTINUED | OUTPATIENT
Start: 2022-07-11 | End: 2022-07-11 | Stop reason: HOSPADM

## 2022-07-11 RX ORDER — DEXTROAMPHETAMINE SACCHARATE, AMPHETAMINE ASPARTATE MONOHYDRATE, DEXTROAMPHETAMINE SULFATE AND AMPHETAMINE SULFATE 5; 5; 5; 5 MG/1; MG/1; MG/1; MG/1
40 CAPSULE, EXTENDED RELEASE ORAL EVERY MORNING
Status: DISCONTINUED | OUTPATIENT
Start: 2022-07-11 | End: 2022-07-11

## 2022-07-11 RX ORDER — DIPHENHYDRAMINE HCL 25 MG
25 TABLET ORAL NIGHTLY PRN
Status: DISCONTINUED | OUTPATIENT
Start: 2022-07-12 | End: 2022-07-11 | Stop reason: HOSPADM

## 2022-07-11 RX ORDER — OXYCODONE HYDROCHLORIDE 5 MG/1
5 TABLET ORAL
Status: DISCONTINUED | OUTPATIENT
Start: 2022-07-11 | End: 2022-07-11 | Stop reason: HOSPADM

## 2022-07-11 RX ORDER — SUMATRIPTAN 25 MG/1
50 TABLET, FILM COATED ORAL 2 TIMES DAILY PRN
Status: CANCELLED | OUTPATIENT
Start: 2022-07-11

## 2022-07-11 RX ORDER — ACETAMINOPHEN 500 MG
1000 TABLET ORAL EVERY 6 HOURS
Status: DISCONTINUED | OUTPATIENT
Start: 2022-07-11 | End: 2022-07-11 | Stop reason: HOSPADM

## 2022-07-11 RX ORDER — ONDANSETRON 2 MG/ML
4 INJECTION INTRAMUSCULAR; INTRAVENOUS
Status: COMPLETED | OUTPATIENT
Start: 2022-07-11 | End: 2022-07-11

## 2022-07-11 RX ORDER — HYDROMORPHONE HYDROCHLORIDE 1 MG/ML
0.4 INJECTION, SOLUTION INTRAMUSCULAR; INTRAVENOUS; SUBCUTANEOUS
Status: DISCONTINUED | OUTPATIENT
Start: 2022-07-11 | End: 2022-07-11 | Stop reason: HOSPADM

## 2022-07-11 RX ORDER — HYDROMORPHONE HYDROCHLORIDE 1 MG/ML
0.1 INJECTION, SOLUTION INTRAMUSCULAR; INTRAVENOUS; SUBCUTANEOUS
Status: DISCONTINUED | OUTPATIENT
Start: 2022-07-11 | End: 2022-07-11 | Stop reason: HOSPADM

## 2022-07-11 RX ORDER — MORPHINE SULFATE 4 MG/ML
4 INJECTION INTRAVENOUS
Status: DISCONTINUED | OUTPATIENT
Start: 2022-07-11 | End: 2022-07-11 | Stop reason: HOSPADM

## 2022-07-11 RX ORDER — DIAZEPAM 2 MG/1
2 TABLET ORAL NIGHTLY PRN
Status: CANCELLED | OUTPATIENT
Start: 2022-07-11

## 2022-07-11 RX ORDER — AMOXICILLIN 250 MG
1 CAPSULE ORAL
Status: DISCONTINUED | OUTPATIENT
Start: 2022-07-11 | End: 2022-07-11 | Stop reason: HOSPADM

## 2022-07-11 RX ORDER — ONDANSETRON 2 MG/ML
4 INJECTION INTRAMUSCULAR; INTRAVENOUS EVERY 4 HOURS PRN
Status: DISCONTINUED | OUTPATIENT
Start: 2022-07-11 | End: 2022-07-11 | Stop reason: HOSPADM

## 2022-07-11 RX ORDER — ACETAMINOPHEN 500 MG
1000 TABLET ORAL ONCE
Status: COMPLETED | OUTPATIENT
Start: 2022-07-11 | End: 2022-07-11

## 2022-07-11 RX ORDER — DIPHENHYDRAMINE HYDROCHLORIDE 50 MG/ML
12.5 INJECTION INTRAMUSCULAR; INTRAVENOUS
Status: DISCONTINUED | OUTPATIENT
Start: 2022-07-11 | End: 2022-07-11 | Stop reason: HOSPADM

## 2022-07-11 RX ORDER — DEXAMETHASONE SODIUM PHOSPHATE 4 MG/ML
INJECTION, SOLUTION INTRA-ARTICULAR; INTRALESIONAL; INTRAMUSCULAR; INTRAVENOUS; SOFT TISSUE PRN
Status: DISCONTINUED | OUTPATIENT
Start: 2022-07-11 | End: 2022-07-11 | Stop reason: SURG

## 2022-07-11 RX ORDER — OXYCODONE HYDROCHLORIDE 10 MG/1
10 TABLET ORAL
Status: DISCONTINUED | OUTPATIENT
Start: 2022-07-11 | End: 2022-07-11 | Stop reason: HOSPADM

## 2022-07-11 RX ORDER — TRANEXAMIC ACID 100 MG/ML
INJECTION, SOLUTION INTRAVENOUS PRN
Status: DISCONTINUED | OUTPATIENT
Start: 2022-07-11 | End: 2022-07-11 | Stop reason: SURG

## 2022-07-11 RX ORDER — SUMATRIPTAN 25 MG/1
50 TABLET, FILM COATED ORAL 2 TIMES DAILY PRN
Status: DISCONTINUED | OUTPATIENT
Start: 2022-07-11 | End: 2022-07-11 | Stop reason: HOSPADM

## 2022-07-11 RX ORDER — POLYETHYLENE GLYCOL 3350 17 G/17G
1 POWDER, FOR SOLUTION ORAL 2 TIMES DAILY PRN
Status: DISCONTINUED | OUTPATIENT
Start: 2022-07-11 | End: 2022-07-11 | Stop reason: HOSPADM

## 2022-07-11 RX ORDER — DEXTROAMPHETAMINE SACCHARATE, AMPHETAMINE ASPARTATE MONOHYDRATE, DEXTROAMPHETAMINE SULFATE AND AMPHETAMINE SULFATE 5; 5; 5; 5 MG/1; MG/1; MG/1; MG/1
40 CAPSULE, EXTENDED RELEASE ORAL EVERY MORNING
Status: CANCELLED | OUTPATIENT
Start: 2022-07-11

## 2022-07-11 RX ORDER — SCOLOPAMINE TRANSDERMAL SYSTEM 1 MG/1
1 PATCH, EXTENDED RELEASE TRANSDERMAL
Status: DISCONTINUED | OUTPATIENT
Start: 2022-07-11 | End: 2022-07-11 | Stop reason: HOSPADM

## 2022-07-11 RX ORDER — DIPHENHYDRAMINE HYDROCHLORIDE 50 MG/ML
25 INJECTION INTRAMUSCULAR; INTRAVENOUS EVERY 6 HOURS PRN
Status: DISCONTINUED | OUTPATIENT
Start: 2022-07-11 | End: 2022-07-11 | Stop reason: HOSPADM

## 2022-07-11 RX ORDER — SODIUM CHLORIDE, SODIUM LACTATE, POTASSIUM CHLORIDE, CALCIUM CHLORIDE 600; 310; 30; 20 MG/100ML; MG/100ML; MG/100ML; MG/100ML
INJECTION, SOLUTION INTRAVENOUS CONTINUOUS
Status: DISCONTINUED | OUTPATIENT
Start: 2022-07-11 | End: 2022-07-11 | Stop reason: HOSPADM

## 2022-07-11 RX ORDER — MEPERIDINE HYDROCHLORIDE 25 MG/ML
12.5 INJECTION INTRAMUSCULAR; INTRAVENOUS; SUBCUTANEOUS
Status: DISCONTINUED | OUTPATIENT
Start: 2022-07-11 | End: 2022-07-11 | Stop reason: HOSPADM

## 2022-07-11 RX ORDER — KETOROLAC TROMETHAMINE 30 MG/ML
30 INJECTION, SOLUTION INTRAMUSCULAR; INTRAVENOUS EVERY 6 HOURS
Status: DISCONTINUED | OUTPATIENT
Start: 2022-07-11 | End: 2022-07-11 | Stop reason: HOSPADM

## 2022-07-11 RX ORDER — BISACODYL 10 MG
10 SUPPOSITORY, RECTAL RECTAL
Status: DISCONTINUED | OUTPATIENT
Start: 2022-07-11 | End: 2022-07-11 | Stop reason: HOSPADM

## 2022-07-11 RX ORDER — DOCUSATE SODIUM 100 MG/1
100 CAPSULE, LIQUID FILLED ORAL 2 TIMES DAILY
Status: DISCONTINUED | OUTPATIENT
Start: 2022-07-11 | End: 2022-07-11 | Stop reason: HOSPADM

## 2022-07-11 RX ORDER — DIAZEPAM 2 MG/1
2 TABLET ORAL NIGHTLY PRN
Status: DISCONTINUED | OUTPATIENT
Start: 2022-07-11 | End: 2022-07-11 | Stop reason: HOSPADM

## 2022-07-11 RX ORDER — AMOXICILLIN 250 MG
1 CAPSULE ORAL NIGHTLY
Status: DISCONTINUED | OUTPATIENT
Start: 2022-07-11 | End: 2022-07-11 | Stop reason: HOSPADM

## 2022-07-11 RX ORDER — ASPIRIN 325 MG
325 TABLET ORAL DAILY
Status: DISCONTINUED | OUTPATIENT
Start: 2022-07-12 | End: 2022-07-11 | Stop reason: HOSPADM

## 2022-07-11 RX ORDER — DEXAMETHASONE SODIUM PHOSPHATE 4 MG/ML
10 INJECTION, SOLUTION INTRA-ARTICULAR; INTRALESIONAL; INTRAMUSCULAR; INTRAVENOUS; SOFT TISSUE ONCE
Status: DISCONTINUED | OUTPATIENT
Start: 2022-07-12 | End: 2022-07-11 | Stop reason: HOSPADM

## 2022-07-11 RX ORDER — OXYCODONE HCL 5 MG/5 ML
5 SOLUTION, ORAL ORAL
Status: COMPLETED | OUTPATIENT
Start: 2022-07-11 | End: 2022-07-11

## 2022-07-11 RX ORDER — IBUPROFEN 400 MG/1
800 TABLET ORAL 3 TIMES DAILY PRN
Status: DISCONTINUED | OUTPATIENT
Start: 2022-07-14 | End: 2022-07-11 | Stop reason: HOSPADM

## 2022-07-11 RX ORDER — HYDROMORPHONE HYDROCHLORIDE 1 MG/ML
0.2 INJECTION, SOLUTION INTRAMUSCULAR; INTRAVENOUS; SUBCUTANEOUS
Status: DISCONTINUED | OUTPATIENT
Start: 2022-07-11 | End: 2022-07-11 | Stop reason: HOSPADM

## 2022-07-11 RX ORDER — MIDAZOLAM HYDROCHLORIDE 1 MG/ML
INJECTION INTRAMUSCULAR; INTRAVENOUS PRN
Status: DISCONTINUED | OUTPATIENT
Start: 2022-07-11 | End: 2022-07-11 | Stop reason: SURG

## 2022-07-11 RX ORDER — LEVOTHYROXINE SODIUM 0.03 MG/1
25 TABLET ORAL
Status: DISCONTINUED | OUTPATIENT
Start: 2022-07-12 | End: 2022-07-11 | Stop reason: HOSPADM

## 2022-07-11 RX ORDER — ONDANSETRON 2 MG/ML
INJECTION INTRAMUSCULAR; INTRAVENOUS PRN
Status: DISCONTINUED | OUTPATIENT
Start: 2022-07-11 | End: 2022-07-11 | Stop reason: SURG

## 2022-07-11 RX ORDER — CEFAZOLIN SODIUM 1 G/3ML
INJECTION, POWDER, FOR SOLUTION INTRAMUSCULAR; INTRAVENOUS PRN
Status: DISCONTINUED | OUTPATIENT
Start: 2022-07-11 | End: 2022-07-11 | Stop reason: SURG

## 2022-07-11 RX ORDER — GABAPENTIN 300 MG/1
300 CAPSULE ORAL ONCE
Status: COMPLETED | OUTPATIENT
Start: 2022-07-11 | End: 2022-07-11

## 2022-07-11 RX ADMIN — ONDANSETRON 4 MG: 2 INJECTION INTRAMUSCULAR; INTRAVENOUS at 13:44

## 2022-07-11 RX ADMIN — GABAPENTIN 300 MG: 300 CAPSULE ORAL at 10:22

## 2022-07-11 RX ADMIN — DOCUSATE SODIUM 100 MG: 100 CAPSULE, LIQUID FILLED ORAL at 18:15

## 2022-07-11 RX ADMIN — PROPOFOL 160 MG: 10 INJECTION, EMULSION INTRAVENOUS at 11:32

## 2022-07-11 RX ADMIN — FENTANYL CITRATE 50 MCG: 50 INJECTION, SOLUTION INTRAMUSCULAR; INTRAVENOUS at 11:29

## 2022-07-11 RX ADMIN — CEFAZOLIN 2 G: 330 INJECTION, POWDER, FOR SOLUTION INTRAMUSCULAR; INTRAVENOUS at 11:36

## 2022-07-11 RX ADMIN — ROCURONIUM BROMIDE 50 MG: 10 INJECTION, SOLUTION INTRAVENOUS at 11:32

## 2022-07-11 RX ADMIN — DEXAMETHASONE SODIUM PHOSPHATE 4 MG: 4 INJECTION, SOLUTION INTRA-ARTICULAR; INTRALESIONAL; INTRAMUSCULAR; INTRAVENOUS; SOFT TISSUE at 11:41

## 2022-07-11 RX ADMIN — HYDROMORPHONE HYDROCHLORIDE 0.5 MG: 2 INJECTION INTRAMUSCULAR; INTRAVENOUS; SUBCUTANEOUS at 11:38

## 2022-07-11 RX ADMIN — SCOPALAMINE 1 PATCH: 1 PATCH, EXTENDED RELEASE TRANSDERMAL at 10:21

## 2022-07-11 RX ADMIN — ACETAMINOPHEN 1000 MG: 500 TABLET ORAL at 18:14

## 2022-07-11 RX ADMIN — FENTANYL CITRATE 50 MCG: 50 INJECTION, SOLUTION INTRAMUSCULAR; INTRAVENOUS at 12:58

## 2022-07-11 RX ADMIN — SUGAMMADEX 200 MG: 100 INJECTION, SOLUTION INTRAVENOUS at 12:57

## 2022-07-11 RX ADMIN — KETOROLAC TROMETHAMINE 30 MG: 30 INJECTION, SOLUTION INTRAMUSCULAR; INTRAVENOUS at 18:14

## 2022-07-11 RX ADMIN — ROCURONIUM BROMIDE 10 MG: 10 INJECTION, SOLUTION INTRAVENOUS at 12:04

## 2022-07-11 RX ADMIN — MIDAZOLAM HYDROCHLORIDE 2 MG: 1 INJECTION, SOLUTION INTRAMUSCULAR; INTRAVENOUS at 11:25

## 2022-07-11 RX ADMIN — HYDROMORPHONE HYDROCHLORIDE 0.5 MG: 2 INJECTION INTRAMUSCULAR; INTRAVENOUS; SUBCUTANEOUS at 11:46

## 2022-07-11 RX ADMIN — LIDOCAINE HYDROCHLORIDE 80 MG: 20 INJECTION, SOLUTION EPIDURAL; INFILTRATION; INTRACAUDAL at 11:32

## 2022-07-11 RX ADMIN — TRANEXAMIC ACID 1000 MG: 100 INJECTION, SOLUTION INTRAVENOUS at 12:32

## 2022-07-11 RX ADMIN — TRANEXAMIC ACID 1000 MG: 100 INJECTION, SOLUTION INTRAVENOUS at 11:37

## 2022-07-11 RX ADMIN — OXYCODONE HYDROCHLORIDE 10 MG: 5 SOLUTION ORAL at 14:10

## 2022-07-11 RX ADMIN — HALOPERIDOL LACTATE 1 MG: 5 INJECTION, SOLUTION INTRAMUSCULAR at 13:59

## 2022-07-11 RX ADMIN — HYDROMORPHONE HYDROCHLORIDE 0.5 MG: 2 INJECTION INTRAMUSCULAR; INTRAVENOUS; SUBCUTANEOUS at 12:29

## 2022-07-11 RX ADMIN — SODIUM CHLORIDE, POTASSIUM CHLORIDE, SODIUM LACTATE AND CALCIUM CHLORIDE: 600; 310; 30; 20 INJECTION, SOLUTION INTRAVENOUS at 10:22

## 2022-07-11 RX ADMIN — ACETAMINOPHEN 1000 MG: 500 TABLET ORAL at 10:22

## 2022-07-11 RX ADMIN — OXYCODONE HYDROCHLORIDE 5 MG: 5 TABLET ORAL at 18:15

## 2022-07-11 RX ADMIN — ONDANSETRON 4 MG: 2 INJECTION INTRAMUSCULAR; INTRAVENOUS at 12:31

## 2022-07-11 ASSESSMENT — LIFESTYLE VARIABLES
AVERAGE NUMBER OF DAYS PER WEEK YOU HAVE A DRINK CONTAINING ALCOHOL: 0
HAVE YOU EVER FELT YOU SHOULD CUT DOWN ON YOUR DRINKING: NO
TOTAL SCORE: 0
ON A TYPICAL DAY WHEN YOU DRINK ALCOHOL HOW MANY DRINKS DO YOU HAVE: 0
HAVE PEOPLE ANNOYED YOU BY CRITICIZING YOUR DRINKING: NO
CONSUMPTION TOTAL: NEGATIVE
ALCOHOL_USE: YES
TOTAL SCORE: 0
TOTAL SCORE: 0
EVER HAD A DRINK FIRST THING IN THE MORNING TO STEADY YOUR NERVES TO GET RID OF A HANGOVER: NO
EVER FELT BAD OR GUILTY ABOUT YOUR DRINKING: NO
HOW MANY TIMES IN THE PAST YEAR HAVE YOU HAD 5 OR MORE DRINKS IN A DAY: 0

## 2022-07-11 ASSESSMENT — COGNITIVE AND FUNCTIONAL STATUS - GENERAL
MOBILITY SCORE: 19
SUGGESTED CMS G CODE MODIFIER DAILY ACTIVITY: CI
WALKING IN HOSPITAL ROOM: A LITTLE
WALKING IN HOSPITAL ROOM: A LITTLE
MOVING TO AND FROM BED TO CHAIR: A LITTLE
STANDING UP FROM CHAIR USING ARMS: A LITTLE
DRESSING REGULAR LOWER BODY CLOTHING: A LITTLE
CLIMB 3 TO 5 STEPS WITH RAILING: A LITTLE
SUGGESTED CMS G CODE MODIFIER MOBILITY: CJ
DAILY ACTIVITIY SCORE: 23
CLIMB 3 TO 5 STEPS WITH RAILING: A LITTLE
MOBILITY SCORE: 22
SUGGESTED CMS G CODE MODIFIER MOBILITY: CK
MOVING FROM LYING ON BACK TO SITTING ON SIDE OF FLAT BED: A LITTLE

## 2022-07-11 ASSESSMENT — PAIN DESCRIPTION - PAIN TYPE
TYPE: SURGICAL PAIN

## 2022-07-11 ASSESSMENT — PATIENT HEALTH QUESTIONNAIRE - PHQ9
SUM OF ALL RESPONSES TO PHQ9 QUESTIONS 1 AND 2: 0
1. LITTLE INTEREST OR PLEASURE IN DOING THINGS: NOT AT ALL
2. FEELING DOWN, DEPRESSED, IRRITABLE, OR HOPELESS: NOT AT ALL

## 2022-07-11 ASSESSMENT — GAIT ASSESSMENTS
GAIT LEVEL OF ASSIST: STANDBY ASSIST
DISTANCE (FEET): 150
DEVIATION: ANTALGIC
ASSISTIVE DEVICE: FRONT WHEEL WALKER

## 2022-07-11 ASSESSMENT — FIBROSIS 4 INDEX
FIB4 SCORE: 0.52
FIB4 SCORE: 0.52

## 2022-07-11 NOTE — DISCHARGE PLANNING
Anticipated Discharge Disposition: Home    Action:  LSW met with pt at bedside to collect DME choice for crutches. Pt gave verbal consent to send referral to pac med. LSW faxed choice form to [employer]. LSW notified bedside RN to get crutches.    Barriers to Discharge: None    Plan: LSW to follow and assist as needed.

## 2022-07-11 NOTE — THERAPY
Physical Therapy   Initial Evaluation     Patient Name: Sara Taylor  Age:  41 y.o., Sex:  female  Medical Record #: [id number]  Today's Date: 7/11/2022     Precautions  Precautions: (P) Posterior Hip Precautions;No Weight Bearing Restrictions Right Lower Extremity    Assessment  Patient is 41 y.o. female who was seen s/p R MICHELLE with posterior hip precautions. Pt was agreeable to therapy evaluation and was able to demonstrate safe upright mobility with use of FWW. Pt states she feels comfortable using crutches upon d/c to home and declined any training as she has used prior to surgery. Pt was able to demonstrate safe gait mechanics with use of FWW with no luzmaria LOB. Pt was educated on supine therapeutic exercises, elevation, icing, postioning, and posterior hip precautions. Pt was primarily limited during session due to intermittent nausea. Pt required supine rest break in therapy gym, after rest break pt reported of improved symptoms. Pt is in no acute skilled PT needs at this time, anticipate pt to d/c home once medically clear and nausea has improved. Recommend outpatient physical therapy services to address higher level deficits.    The following txt were provided: Pt was provided with frequent VC, TC, facilitation, and demonstration for appropriate therapeutic exercises. Pt requested for second attempt at therapeutic exercise education and was able to perform 1 set of 5 reps of each therapeutic exercise with appropriate mechanics.     Plan    Recommend Physical Therapy for Evaluation only     DC Equipment Recommendations: (P) Crutches  Discharge Recommendations: (P) Recommend outpatient physical therapy services to address higher level deficits     Objective       07/11/22 1555   Initial Contact Note    Initial Contact Note Order Received and Verified, Evaluation Only - Patient Does Not Require Further Acute Physical Therapy at this Time.  However, May Benefit from Post Acute Therapy for Higher Level  Functional Deficits.   Precautions   Precautions Posterior Hip Precautions;No Weight Bearing Restrictions Right Lower Extremity   Pain 0 - 10 Group   Location Hip   Location Orientation Right   Description Aching   Therapist Pain Assessment Prior to Activity;Post Activity;During Activity;Nurse Notified;3   Prior Living Situation   Prior Services None   Housing / Facility 1 Story House   Steps Into Home 0   Steps In Home 0   Equipment Owned None   Lives with - Patient's Self Care Capacity Significant Other;Child Less than 18 Years of Age   Comments pt states spouse and dtr can assist upon d/c to home   Prior Level of Functional Mobility   Bed Mobility Independent   Transfer Status Independent   Ambulation Independent   Distance Ambulation (Feet)   (community)   Assistive Devices Used None   Stairs Independent   Comments pt reports of an IPLOF   History of Falls   History of Falls No   Cognition    Cognition / Consciousness WDL   Level of Consciousness Alert   Passive ROM Lower Body   Passive ROM Lower Body X   Comments limited due to pain and posterior hip precautions; able to demo functional PROM for B LE   Active ROM Lower Body    Active ROM Lower Body  X   Comments same as above   Strength Lower Body   Lower Body Strength  X   Comments limited due to pain; able to demo functional strength for B LE   Sensation Lower Body   Lower Extremity Sensation   WDL   Lower Body Muscle Tone   Lower Body Muscle Tone  WDL   Strength Upper Body   Upper Body Strength  WDL   Upper Body Muscle Tone   Upper Body Muscle Tone  WDL   Neurological Concerns   Neurological Concerns No   Coordination Upper Body   Coordination WDL   Coordination Lower Body    Coordination Lower Body  WDL   Gait Analysis   Gait Level Of Assist Standby Assist   Assistive Device Front Wheel Walker   Distance (Feet) 150   # of Times Distance was Traveled 1   Deviation Antalgic   Weight Bearing Status none noted   Comments cues for wider VILMA during ambulation; no  luzmaria LOB noted   Bed Mobility    Supine to Sit Supervised   Sit to Supine Supervised   Scooting Supervised   Rolling Supervised   Functional Mobility   Sit to Stand Standby Assist   Bed, Chair, Wheelchair Transfer Standby Assist   Transfer Method Stand Step   Mobility EOB, sit<>stand, ambulation, back to bed   Comments cues for handplacement upon standing and positioning of R LE to mainain posterior hip precautions   How much difficulty does the patient currently have...   Turning over in bed (including adjusting bedclothes, sheets and blankets)? 4   Sitting down on and standing up from a chair with arms (e.g., wheelchair, bedside commode, etc.) 4   Moving from lying on back to sitting on the side of the bed? 4   How much help from another person does the patient currently need...   Moving to and from a bed to a chair (including a wheelchair)? 4   Need to walk in a hospital room? 3   Climbing 3-5 steps with a railing? 3   6 clicks Mobility Score 22   Activity Tolerance   Sitting in Chair NT   Sitting Edge of Bed 10 mins   Standing 8 mins   Comments limited due to nausea   Edema / Skin Assessment   Edema / Skin  Not Assessed   Education Group   Education Provided Role of Physical Therapist;Hip Precautions Posterior   Hip Precautions Posterior Patient Response Patient;Acceptance;Explanation;Demonstration;Verbal Demonstration;Action Demonstration;Handout   Role of Physical Therapist Patient Response Patient;Acceptance;Demonstration;Explanation;Verbal Demonstration;Action Demonstration   Anticipated Discharge Equipment and Recommendations   DC Equipment Recommendations Crutches   Discharge Recommendations Recommend outpatient physical therapy services to address higher level deficits   Interdisciplinary Plan of Care Collaboration   IDT Collaboration with  Nursing   Patient Position at End of Therapy In Bed;Call Light within Reach;Tray Table within Reach;Phone within Reach   Collaboration Comments aware of visit and  recs   Session Information   Date / Session Number  7/11 eval only   Priority 0

## 2022-07-11 NOTE — PROGRESS NOTES
Pt admitted to room 216-1 from PACU at 1500. Pt is alert and oriented, on 10L O2 via oxymask. Pt c/o R hip surgical pain 5/10 at this time, declines intervention. Pt ambulated to bathroom, voided, and ambulated back to bed. Assessment complete. R hip surgical dressing CDI. Pt able to dorsi/plantar flex BLE, declines any numbness/tingling at this time. Safety precautions in place.

## 2022-07-11 NOTE — CARE PLAN
The patient is Stable - Low risk of patient condition declining or worsening    Shift Goals  Clinical Goals: Pt will ambulate at least 50ft and void this shift    Progress made toward(s) clinical / shift goals:  Pt is progressing, ambulating with steady gait and FWW    Patient is not progressing towards the following goals:

## 2022-07-11 NOTE — ANESTHESIA POSTPROCEDURE EVALUATION
Patient: Sara Taylor    Procedure Summary     Date: 07/11/22 Room / Location:  OR  / SURGERY [facility] [place]    Anesthesia Start: 1125 Anesthesia Stop: 1306    Procedure: RIGHT TOTAL HIP ARTHROPLASTY (Right Hip) Diagnosis: (degenerative joint disease of right hip)    Surgeons: Catarino Galloway M.D. Responsible Provider: Chandler Benavides M.D.    Anesthesia Type: general ASA Status: 2          Final Anesthesia Type: general  Last vitals  BP   Blood Pressure: (!) 91/50    Temp   36.2 °C (97.2 °F)    Pulse   (!) 54   Resp   14    SpO2   100 %      Anesthesia Post Evaluation    Patient location during evaluation: PACU  Patient participation: complete - patient participated  Level of consciousness: awake and alert    Airway patency: patent  Anesthetic complications: no  Cardiovascular status: hemodynamically stable  Respiratory status: acceptable  Hydration status: euvolemic    PONV: none          No complications documented.     Nurse Pain Score: 5 (NPRS)

## 2022-07-11 NOTE — ANESTHESIA PREPROCEDURE EVALUATION
Case: [id number] Date/Time: 07/11/22 1015    Procedure: RIGHT TOTAL HIP ARTHROPLASTY AND REAPAIRS AS INDICATED (Right )    Pre-op diagnosis: degenerative joint disease of right hip    Location:  OR  / SURGERY [facility]    Surgeons: Catarino Galloway M.D.          Relevant Problems   NEURO   (positive) Migraine with aura and without status migrainosus, not intractable   (positive) Tension headache      CARDIAC   (positive) Migraine with aura and without status migrainosus, not intractable      ENDO   (positive) Acquired hypothyroidism       Physical Exam    Airway   Mallampati: II  TM distance: >3 FB  Neck ROM: full       Cardiovascular - normal exam  Rhythm: regular  Rate: normal     Dental - normal exam           Pulmonary - normal exam     Abdominal    Neurological - normal exam                 Anesthesia Plan    ASA 2       Plan - general       Airway plan will be ETT          Induction: intravenous    Postoperative Plan: Postoperative administration of opioids is intended.    Pertinent diagnostic labs and testing reviewed    Informed Consent:    Anesthetic plan and risks discussed with patient.    Use of blood products discussed with: patient whom consented to blood products.

## 2022-07-11 NOTE — CARE PLAN
The patient is Stable - Low risk of patient condition declining or worsening    Shift Goals  Clinical Goals: Pt will ambulate at least 50ft and void this shift    Progress made toward(s) clinical / shift goals:  Pt is progressing, ambulated greater than 50ft with steady gait, standby assist and FWW    Patient is not progressing towards the following goals:

## 2022-07-11 NOTE — OR NURSING
1303 To PACU from OR via bed s/p right hip arthroplasty. Pt drowsy, respirations spontaneous and non-labored. Surgical dressings to right hip. Toes to the affected extremity are pink and warm, brisk cap refill observed, pedal pulse palpable.    1318 Pt reports tolerable level of pain to her right hip. Pt denies nausea.    1324 Report to NIC Taylor.

## 2022-07-11 NOTE — DISCHARGE INSTRUCTIONS
Discharge Instructions for the Orthopedic Patient    Follow up with your Primary Care Provider within 2 weeks of discharge to home regarding the following:    Review of current medications and diagnostic testing.  Surveillance for medical complications.  Workup and treatment of osteoporosis, if appropriate.       TOTAL HIP REPLACEMENT, AFTER-CARE GUIDELINES     These instructions provide you with information on caring for yourself and your hip after surgery. Your health care provider may also give you instructions that are more specific. Your treatment was planned and performed according to current medical practices but problems sometimes occur. Call your health care provider if you have any problems or questions.     WHAT TO EXPECT AFTER THE PROCEDURE   After your procedure, your hip will typically be stiff, sore, and bruised. This will improve over time.     Pain   Follow your home pain management plan as discussed with your nurse and as directed by your provider.   It is important to follow any scheduled pain medications for maximal pain relief.   If prescribed opioid medication, the goal is to use opioids only as needed and to wean off prescription pain medicine as soon as possible.   Ice use for pain control.  Put ice in a plastic bag.   Place a towel between your skin and the bag.   Leave the ice on for 20 minutes, 2-3 times a day at a minimum.   Most patients are off the pain pills by 3 weeks. If your pain continues to be severe, follow up with your provider.     Infection   Deep hip joint infections that require removal of the prostheses occur in less than 1.0% of patients. Lesser infections in the skin (cellulites) are more common and much more easily treated.   Keep the incision as clean and dry as possible.   Always wash your hands before touching your incision.   Avoid dental care for 3 months after surgery. Your provider may recommend taking a dose of antibiotics an hour prior to any dental procedure.  After 2 years, most providers recommend antibiotics only before an extensive procedure. Ask your provider what they recommend.   Signs and symptoms of infection include low-grade fever, redness, pain, swelling and drainage from your incision. Notify your provider IMMEDIATELY if you develop ANY of these symptoms.     Post op Disturbances   Bowel Habits - constipation is extremely common and caused by a combination of anesthesia, lack of mobility, dehydration and pain medicine. Use stool softeners or laxatives if necessary. It is important not to ignore this problem as bowel obstructions can be a serious complication after joint replacement surgery.   Mood/Energy Level - Many patients experience a lack of energy and endurance for up to 2-3 months after surgery. Some people feel down and can even become depressed. This is likely due to postoperative anemia, change in activity level, lack of sleep, pain medicine and just the emotional reaction to the surgery itself that is a big disruption in a person’s life. This usually passes. If symptoms persist, follow up with your primary care provider.   Returning to Work - Your provider will give you specific instructions based on your profession. Generally, if you work a sedentary job requiring little standing or walking, most patients may return within 2-6 weeks. Manual labor jobs involving walking, lifting and standing may take 3-4 months. Your provider’s office can provide a release to part-time or light duty work early on in your recovery and progress you to full duty as able.   Driving - You can begin driving once cleared by your provider, provided you are no longer taking narcotic pain medication or any other medications that impair driving. Discuss the length of time with your doctor as returning to driving will depend on things such as your vehicle, which hip was replaced (right or left) and if you do or do not have post-operative movement precautions.   Avoiding falls  - A fall during the first few weeks after surgery can damage your new hip and may result in a need for further surgery.  throw rugs and tack down loose carpeting. Be aware of floor hazards such as pets, small objects or uneven surfaces. Notify your provider of any falls.   Airport Metal Detectors - The sensitivity of metal detectors varies and it is likely that your prosthesis will cause an alarm. Inform the  of your artificial joint.     Diet   Resume your normal diet as tolerated.   It is important to achieve a healthy nutritional status by eating a well-balanced diet on a regular basis.   Your provider may recommend that you take iron and vitamin supplements.   Continue to drink plenty of fluids.     Shower/Bathing   You may shower as soon as you get home from the hospital unless otherwise instructed.   Keep your incision out of water to prevent infection. To keep the incision dry when showering, cover it with a plastic bag or plastic wrap. If your bandage is waterproof, this may not be necessary.   Pat incision dry if it gets wet. Do not rub. Notify your provider.   Do not submerge in a bath until cleared by your provider. Your staples must be out and the incision completely healed.     Dressing Changes: Only change your dressing if directed by your provider.   Wash hands.   Open all dressing change materials.   Remove old dressing and discard.   Inspect incision for signs of irritation or infection including redness, increase in clear drainage, yellow/green drainage, odor and surrounding skin hot to touch. Notify your provider if present.    the new dressing by one corner and lay over the incision. Be careful not to touch the inside of the dressing that will lay over the incision.   Secure in place as instructed.     Swelling/Bruising   Swelling is normal after hip replacement and can involve the thigh, knee, calf and foot.   Swelling can last from 3-6 months.   To reduce swelling,  elevate your leg higher than your heart while reclining. The first week you are home you should elevate your leg an equal amount of time as you are active.   The swelling is usually worse after you go home since you are upright for longer periods of time.   Bruising often does not appear until after you arrive home and can be quite dramatic- appearing purple, black, or green. Bruising is typically not concerning and will subside without any treatment.     Blood Clot Prevention   Your treatment plan includes multiple preventative measures to decrease the risk of blood clots in the legs (DVTs) and the less common, but serious, clots that travel to the lungs (pulmonary emboli). Most patients are at standard risk for them, but people who are at higher risk include those who have had previous clots, a family history of clotting, smoking, diabetes, obesity, advanced age, use estrogen and/or live a sedentary lifestyle.     Signs of blood clots in legs include - Swelling in thigh, calf or ankle that does not go down with elevation. Pain, heat and tenderness in calf, back of calf or groin area. NOTE: blood clots can occur in either leg.   Signs of blood clots in lungs include - Sudden increased shortness of breath, sudden onset of chest pain, and localized chest pain with coughing.   If you experience any of the above symptoms, notify your provider and seek medical attention immediately.   You received anticoagulant therapy (blood thinners) in the hospital. Continue the prescribed blood-thinning medication at home, as directed by your provider.   Your risk for developing a clot continues for up to 2-3 months after surgery. Avoid prolonged sitting and dehydration (long air trips and car trips). If you do take a trip during this time, please get up, move around every 1-1.5 hours, and discuss all travel plans with your provider.     Activity   Once you get home, stay active. The key is not to overdo it. While you can expect  some good days and some bad days, you should notice a gradual improvement over time and a gradual increase in endurance over the next 6 to 12 months.     Weight Bearing - You can begin to bear weight as tolerated unless otherwise directed by your provider. Use a walker, crutches or cane to assist with walking until you can walk smoothly (minimal or no limp) without assistance.   Physical Precautions - To assure proper recovery and tissue healing, you may be asked to take special precautions when moving (sitting, bending or sleeping) - usually for the first 6 weeks after surgery. Precautions vary from patient to patient depending on surgical approach used by your provider. Follow any specific precautions provided by your provider and physical therapist until cleared by your provider.   Sitting - Early on it is easier to get up from a tall chair or a chair with arms. The physical therapist will show you how to sit and stand from a chair keeping your affected leg out in front of you. Get up and move around on a regular basis--at least once every hour.   Walking - Walk as much as you like once your doctor gives permission to proceed, but remember that walking is no substitute for your prescribed exercises.  Follow the home exercise program prescribed during your hospital stay as directed by your physical therapist or provider.   Continued physical therapy may be prescribed after hospital discharge to strengthen your muscles and improve your gait/walking pattern. The decision to have therapy or not after the hospital stay is made by you and your provider prior to surgery or at your follow up appointment.   Swimming is an excellent low impact activity; you can begin as soon as the wound is healed and your provider clears you. Using a pair of training fins may make swimming a more enjoyable and effective exercise.   Sexual activity - Your provider can tell you when it is safe to resume sexual activity.   Other activities -  Low impact activities are preferred. If you have specific questions, consult your provider.     When to Call the Doctor   Call the provider if you experience:   Fever over 100.5° F   Increased pain, drainage, redness, odor or heat around the incision area   Shaking chills   Increased knee pain with activity and rest   Increased pain in calf, tenderness or redness above or below the knee   Increased swelling of calf, ankle, foot   Sudden increased shortness of breath, sudden onset of chest pain, localized chest pain with coughing   Incision opening   Or, if there are any questions or concerns about medications or care.     Infection statistic resource:   https://www.[employer]Fillm/contents/prosthetic-joint-infection-epidemiology-microbiology-clinical-manifestations-and-diagnosis

## 2022-07-11 NOTE — DISCHARGE PLANNING
Received Choice form at 1537  Agency/Facility Name: [facility]   Referral not sent, Chart is locked.     Choice saved in .      @[phone]  Referral sent to [facility].

## 2022-07-11 NOTE — OP REPORT
DATE OF OPERATION: 7/11/2022      PREOPERATIVE DIAGNOSES:   1. Osteoarthritis, right hip.     POSTOPERATIVE DIAGNOSES:   1. Osteoarthritis, right hip.       PROCEDURES:   1. Right total hip arthroplasty.   2. Intraoperative block for rtelief of extensive postoperative pain.    SURGEON: Catarino Galloway MD   ASSISTANT: Davis Marcial CST FA    ANESTHESIA: General, Dr Kennedy      IMPLANTS: Lan size 48 Trilogy cup with a size 8 taperloc stem with   a 32 mm 0 ceramic head with and a 32 mm flat cross-linked polyethylene.     WEIGHTBEARING: As tolerated.     FINDINGS:   1. Advanced chondromalacic changes of the femoral head.      PROCEDURE IN DETAIL:   The patient was seen in the preop holding area and consent reviewed. The right lower extremity was marked with patient. Patient was taken to the operating room where general anesthesia was given. The body-exhaust uniforms were utilized. Perioperative antibiotics were given The patient then was positioned in a lateral decubitus position with axillary roll and Stulberg.  All bony prominences were padded. The right lower extremity was addressed with a Hibiclens, alcohol and ChloraPrep. Then sterile draping technique was performed. Air isolation draping was undertaken. An appropriate timeout was  undertaken.        The surgery was initiated with a minimally invasive posterior approach. Skin   and subcutaneous tissue were incised. Hemostasis was obtained. The fascia   uriel and gluteal fascia were split. Short external rotators were taken down   as a single layer. T-capsular incision was made.  The hip was dislocated.     Intraoperative periarticular and extensive jennifer-incisional block was undertaken. This was undertaken with a combination of Toradol ropivacaine and morphine to treat postoperative pain. This was done extensively throughout the pericapsular tissues, followed by an extensive block in the peritrochanteric tissues, followed by an extensive tissue infusion into the  subcutaneous tissues. This obtained a significant intraoperative improvement in anesthesia requirements as well as pain management and vitals stabilization.    Following this, the acetabulum was addressed with Labral excision and serial reaming.  Next, the acetabulum was reverse reamed with bone graft  and the Trilogy cup inserted. The polyethylene was inserted after pulse lavage and the locking mechanism checked.     At this point, the femur was addressed.  Cookie cutter was used.  Then serial reaming   and broaching was undertaken.  Trial reductions were undertaken with reproduction and stabilization in the patient's length and offset. The final stem was then inserted.  A trial reductions repeated and  neck length and head chosen.  After pulse lavage,  cleaning and drying the Ball taper the head was inserted and the hip was relocated, full extension, external rotation stability as well as sleeping stability to 70 degrees and flexed 90 stability to 40 degrees was noted. Pulse lavage was repeated. T-capsular closure was done with #2 FiberWire. Short external rotator repair over bone bridges with #2 FiberWire.    The fascia uriel repaired with #1 Quill barbed suture  The  subcutaneous 0 and 2-0 Monocryl and then closure of the skin. Patient was placed into a sterile bandage. The patient was  awoken from anesthetic and taken to the recovery room, tolerated the procedure very well with no signs of complications.

## 2022-07-11 NOTE — OR NURSING
1324: Report received from Vika LUCERO. Patient resting in bed. Alert and oriented x4.     1329: Xray at bedside.     1338: Xray complete.     1340: Patient c/o pain to right hip, unable to place a number on pain but states it is moderate and would like some pain medication.    1345: Before medicating for pain patient started to c/o nausea. Queasease provided to patient. Also medicated per MAR. Will medicate pain once nausea has improved.     1350: Patients  updated. Patient placed on 10 L oxymask per [facility]AS orders.     1400: Patient states nausea has not improved at all. Medicated per MAR.     1410: Nausea improved per patient. Patient tolerating sprite and crackers. Pain medicated per MAR.     1425: Patient sleeping intermittently. Easily rouses to voice.     1445: Patient meets criteria to transfer to floor. Report called to [facility].     1450: Patient transported to [facility] on 10 L oxymask via bed by transport.

## 2022-07-12 NOTE — PROGRESS NOTES
4 Eyes Skin Assessment Completed by NIC Conley and NIC Chandler.    Head WDL  Ears WDL  Nose WDL  Mouth WDL  Neck WDL  Breast/Chest WDL  Shoulder Blades WDL  Spine WDL  (R) Arm/Elbow/Hand WDL  (L) Arm/Elbow/Hand WDL  Abdomen WDL  Groin WDL  Scrotum/Coccyx/Buttocks WDL  (R) Leg Incision Surgical dressing in place, CDI  (L) Leg WDL  (R) Heel/Foot/Toe WDL  (L) Heel/Foot/Toe WDL          Devices In Places Blood Pressure Cuff, Pulse Ox and SCD's      Interventions In Place Pressure Redistribution Mattress    Possible Skin Injury No    Pictures Uploaded Into Epic N/A  Wound Consult Placed N/A   RN Wound Prevention Protocol Ordered No

## 2022-08-22 ENCOUNTER — TELEMEDICINE (OUTPATIENT)
Dept: MEDICAL GROUP | Age: 42
End: 2022-08-22
Payer: COMMERCIAL

## 2022-08-22 VITALS — BODY MASS INDEX: 29.27 KG/M2 | WEIGHT: 155 LBS | HEIGHT: 61 IN

## 2022-08-22 DIAGNOSIS — F90.0 ATTENTION DEFICIT HYPERACTIVITY DISORDER (ADHD), PREDOMINANTLY INATTENTIVE TYPE: ICD-10-CM

## 2022-08-22 DIAGNOSIS — E55.9 VITAMIN D DEFICIENCY: ICD-10-CM

## 2022-08-22 DIAGNOSIS — Z12.31 ENCOUNTER FOR SCREENING MAMMOGRAM FOR BREAST CANCER: ICD-10-CM

## 2022-08-22 DIAGNOSIS — E03.9 ACQUIRED HYPOTHYROIDISM: ICD-10-CM

## 2022-08-22 DIAGNOSIS — E78.2 MIXED HYPERLIPIDEMIA: ICD-10-CM

## 2022-08-22 PROBLEM — M16.10 ARTHRITIS OF HIP: Status: RESOLVED | Noted: 2022-07-11 | Resolved: 2022-08-22

## 2022-08-22 PROCEDURE — 99214 OFFICE O/P EST MOD 30 MIN: CPT | Mod: 95 | Performed by: INTERNAL MEDICINE

## 2022-08-22 RX ORDER — DEXTROAMPHETAMINE SACCHARATE, AMPHETAMINE ASPARTATE MONOHYDRATE, DEXTROAMPHETAMINE SULFATE AND AMPHETAMINE SULFATE 5; 5; 5; 5 MG/1; MG/1; MG/1; MG/1
20 CAPSULE, EXTENDED RELEASE ORAL EVERY MORNING
Qty: 90 CAPSULE | Refills: 0 | Status: SHIPPED | OUTPATIENT
Start: 2022-08-22 | End: 2022-10-29 | Stop reason: SDUPTHER

## 2022-08-22 RX ORDER — DEXTROAMPHETAMINE SACCHARATE, AMPHETAMINE ASPARTATE, DEXTROAMPHETAMINE SULFATE AND AMPHETAMINE SULFATE 2.5; 2.5; 2.5; 2.5 MG/1; MG/1; MG/1; MG/1
10 TABLET ORAL
Qty: 90 TABLET | Refills: 0 | Status: SHIPPED | OUTPATIENT
Start: 2022-08-22 | End: 2022-10-29 | Stop reason: SDUPTHER

## 2022-08-22 ASSESSMENT — FIBROSIS 4 INDEX: FIB4 SCORE: 0.53

## 2022-08-23 NOTE — PROGRESS NOTES
"Virtual Visit: Established Patient   This visit was conducted via Zoom using secure and encrypted videoconferencing technology.   The patient was in their home in the state of [place].    The patient's identity was confirmed and verbal consent was obtained for this virtual visit.     Subjective:   CC:   Chief Complaint   Patient presents with    Follow-Up     Medication renewal        Sara Taylor is a 42 y.o. female presenting for evaluation and management of:       Patient Active Problem List    Diagnosis Date Noted    Acquired hypothyroidism 10/15/2020    B12 deficiency 10/15/2020    Eyelid disorder 10/15/2020    Anxiety 03/29/2018    Tension headache 03/29/2018    Attention deficit hyperactivity disorder (ADHD), predominantly inattentive type 03/29/2018    Multiple allergies 03/29/2018    Vitamin D deficiency disease 08/15/2016    Migraine with aura and without status migrainosus, not intractable 06/09/2015    Mixed hyperlipidemia 10/07/2013     \  Allergies   Allergen Reactions    Normal Saline [Sodium Chloride] Swelling     2018-had swelling from IVF and was told to inform providers.  States easily retains water. Was pregnant and had fluid retention, but increased fluid retention after birth, and worse.     Outpatient Medications Prior to Visit   Medication Sig Dispense Refill    amphetamine-dextroamphetamine (ADDERALL XR) 20 MG per XR capsule Take 40 mg by mouth every morning.      hydroquinone 4 % cream Apply 1 Application topically 1 time a day as needed (rash). 28.35 g 2    Adapalene 0.3 % Gel       ondansetron (ZOFRAN) 4 MG Tab tablet       oxyCODONE-acetaminophen (PERCOCET) 5-325 MG Tab       B-D 3CC LUER-CUBA SYR 25GX1\" 25G X 1\" 3 ML Misc       Probiotic Product (PROBIOTIC PO) Take 1 Capsule by mouth every day.      Ferrous Sulfate (IRON PO) Take 325 mg by mouth 1 time a day as needed. During menstrual cycle      FIBER PO Take 1 Scoop by mouth every day.      diazePAM (VALIUM) 2 MG Tab Take 2 mg by " mouth at bedtime as needed for Anxiety. During menstrual cycle.      Cholecalciferol (VITAMIN D) 50 MCG (2000 UT) Cap Take 1 Capsule by mouth every day. 100 Capsule 3    cyclobenzaprine (FLEXERIL) 10 mg Tab Take 1 Tablet by mouth 3 times a day as needed. 90 Tablet 1    levothyroxine (SYNTHROID) 25 MCG Tab Take 1 Tablet by mouth every morning on an empty stomach. 90 Tablet 3    Cholecalciferol (VITAMIN D3) 5000 units Tab Take 5,000 Units by mouth every day. 30 Tab      No facility-administered medications prior to visit.     No visits with results within 1 Month(s) from this visit.   Latest known visit with results is:   Admission on 07/11/2022, Discharged on 07/11/2022   Component Date Value    POC Urine Pregnancy Test 07/11/2022 Negative     ABO Grouping Only 07/11/2022 O     Rh Grouping Only 07/11/2022 POS     Antibody Screen-Cod 07/11/2022 NEG     ABO Rh Confirm 07/11/2022 O POS       'No results found for: HBA1C  Lab Results   Component Value Date/Time    SODIUM 137 06/28/2022 03:47 PM    POTASSIUM 3.7 06/28/2022 03:47 PM    CHLORIDE 102 06/28/2022 03:47 PM    CO2 24 06/28/2022 03:47 PM    GLUCOSE 100 (H) 06/28/2022 03:47 PM    BUN 9 06/28/2022 03:47 PM    CREATININE 0.62 06/28/2022 03:47 PM    ALKPHOSPHAT 81 03/12/2022 11:34 AM    ASTSGOT 15 03/12/2022 11:34 AM    ALTSGPT 14 03/12/2022 11:34 AM    TBILIRUBIN 0.4 03/12/2022 11:34 AM     No results found for: INR  Lab Results   Component Value Date/Time    CHOLSTRLTOT 208 (H) 03/12/2022 11:34 AM     (H) 03/12/2022 11:34 AM    HDL 61 03/12/2022 11:34 AM    TRIGLYCERIDE 159 (H) 03/12/2022 11:34 AM       No results found for: TESTOSTERONE  No results found for: TSH  Lab Results   Component Value Date/Time    FREET4 0.98 09/09/2013 11:26 AM     No results found for: URICACID  No components found for: VITB12  Lab Results   Component Value Date/Time    25HYDROXY 22 (L) 03/12/2022 11:34 AM    25HYDROXY 22 (L) 11/29/2016 12:13 PM     .      ROS        Current  "medicines (including changes today)  Current Outpatient Medications   Medication Sig Dispense Refill    amphetamine-dextroamphetamine (ADDERALL XR) 20 MG per XR capsule Take 1 Capsule by mouth every morning for 90 days. 90 Capsule 0    amphetamine-dextroamphetamine (ADDERALL) 10 MG Tab Take 1 Tablet by mouth 1 time a day as needed (EACH AFTERNOON PRN ADD/ADHD EA) for up to 90 days. 90 Tablet 0    amphetamine-dextroamphetamine (ADDERALL XR) 20 MG per XR capsule Take 40 mg by mouth every morning.      hydroquinone 4 % cream Apply 1 Application topically 1 time a day as needed (rash). 28.35 g 2    Adapalene 0.3 % Gel       ondansetron (ZOFRAN) 4 MG Tab tablet       oxyCODONE-acetaminophen (PERCOCET) 5-325 MG Tab       B-D 3CC LUER-CUBA SYR 25GX1\" 25G X 1\" 3 ML Misc       Probiotic Product (PROBIOTIC PO) Take 1 Capsule by mouth every day.      Ferrous Sulfate (IRON PO) Take 325 mg by mouth 1 time a day as needed. During menstrual cycle      FIBER PO Take 1 Scoop by mouth every day.      diazePAM (VALIUM) 2 MG Tab Take 2 mg by mouth at bedtime as needed for Anxiety. During menstrual cycle.      Cholecalciferol (VITAMIN D) 50 MCG (2000 UT) Cap Take 1 Capsule by mouth every day. 100 Capsule 3    cyclobenzaprine (FLEXERIL) 10 mg Tab Take 1 Tablet by mouth 3 times a day as needed. 90 Tablet 1    levothyroxine (SYNTHROID) 25 MCG Tab Take 1 Tablet by mouth every morning on an empty stomach. 90 Tablet 3    Cholecalciferol (VITAMIN D3) 5000 units Tab Take 5,000 Units by mouth every day. 30 Tab      No current facility-administered medications for this visit.       Patient Active Problem List    Diagnosis Date Noted    Acquired hypothyroidism 10/15/2020    B12 deficiency 10/15/2020    Eyelid disorder 10/15/2020    Anxiety 03/29/2018    Tension headache 03/29/2018    Attention deficit hyperactivity disorder (ADHD), predominantly inattentive type 03/29/2018    Multiple allergies 03/29/2018    Vitamin D deficiency disease 08/15/2016 " "   Migraine with aura and without status migrainosus, not intractable 06/09/2015    Mixed hyperlipidemia 10/07/2013        Objective:   Ht 1.549 m (5' 1\")   Wt 70.3 kg (155 lb)   BMI 29.29 kg/m²     Physical Exam:     Constitutional: Alert, no distress, well-groomed.  Skin: No rashes in visible areas.  Eye: Round. Conjunctiva clear, lids normal. No icterus.   ENMT: Lips pink without lesions, good dentition, moist mucous membranes. Phonation normal.  Neck: No masses, no thyromegaly. Moves freely without pain.  Respiratory: Unlabored respiratory effort, no cough or audible wheeze  Psych: Alert and oriented x3, normal affect and mood.     Assessment and Plan:   The following treatment plan was discussed:     1. Encounter for screening mammogram for breast cancer  - MA-SCREENING MAMMO BILAT W/TOMOSYNTHESIS W/CAD; Future    2. Attention deficit hyperactivity disorder (ADHD), predominantly inattentive type    Under good control. Continue same regimen.    - amphetamine-dextroamphetamine (ADDERALL XR) 20 MG per XR capsule; Take 1 Capsule by mouth every morning for 90 days.  Dispense: 90 Capsule; Refill: 0  - amphetamine-dextroamphetamine (ADDERALL) 10 MG Tab; Take 1 Tablet by mouth 1 time a day as needed (EACH AFTERNOON PRN ADD/ADHD EA) for up to 90 days.  Dispense: 90 Tablet; Refill: 0    3. Arthritis of hip-this is resolved status post hip arthroplasty.    4. Mixed hyperlipidemia Under good control. Continue same regimen.;    5. Vitamin D deficiency diseaseUnder good control. Continue same regimen.;    6. Acquired hypothyroidism  Under good control. Continue same regimen.;    Follow-up: 3 months for Adderall refill         "

## 2022-09-19 ENCOUNTER — HOSPITAL ENCOUNTER (OUTPATIENT)
Dept: LAB | Facility: MEDICAL CENTER | Age: 42
End: 2022-09-19
Attending: INTERNAL MEDICINE
Payer: COMMERCIAL

## 2022-09-19 DIAGNOSIS — E34.8 ESTRADIOL DEFICIENCY: ICD-10-CM

## 2022-09-19 DIAGNOSIS — E03.9 ACQUIRED HYPOTHYROIDISM: ICD-10-CM

## 2022-09-19 DIAGNOSIS — E55.9 VITAMIN D DEFICIENCY: ICD-10-CM

## 2022-09-19 DIAGNOSIS — E78.2 MIXED HYPERLIPIDEMIA: ICD-10-CM

## 2022-09-19 LAB
25(OH)D3 SERPL-MCNC: 20 NG/ML (ref 30–100)
ALBUMIN SERPL BCP-MCNC: 4.4 G/DL (ref 3.2–4.9)
ALBUMIN/GLOB SERPL: 1.5 G/DL
ALP SERPL-CCNC: 93 U/L (ref 30–99)
ALT SERPL-CCNC: 16 U/L (ref 2–50)
ANION GAP SERPL CALC-SCNC: 11 MMOL/L (ref 7–16)
AST SERPL-CCNC: 16 U/L (ref 12–45)
BASOPHILS # BLD AUTO: 0.6 % (ref 0–1.8)
BASOPHILS # BLD: 0.04 K/UL (ref 0–0.12)
BILIRUB SERPL-MCNC: 0.2 MG/DL (ref 0.1–1.5)
BUN SERPL-MCNC: 10 MG/DL (ref 8–22)
CALCIUM SERPL-MCNC: 9.1 MG/DL (ref 8.5–10.5)
CHLORIDE SERPL-SCNC: 105 MMOL/L (ref 96–112)
CHOLEST SERPL-MCNC: 247 MG/DL (ref 100–199)
CO2 SERPL-SCNC: 23 MMOL/L (ref 20–33)
CREAT SERPL-MCNC: 0.82 MG/DL (ref 0.5–1.4)
EOSINOPHIL # BLD AUTO: 0.05 K/UL (ref 0–0.51)
EOSINOPHIL NFR BLD: 0.7 % (ref 0–6.9)
ERYTHROCYTE [DISTWIDTH] IN BLOOD BY AUTOMATED COUNT: 43.4 FL (ref 35.9–50)
FSH SERPL-ACNC: 4.3 MIU/ML
GFR SERPLBLD CREATININE-BSD FMLA CKD-EPI: 93 ML/MIN/1.73 M 2
GLOBULIN SER CALC-MCNC: 3 G/DL (ref 1.9–3.5)
GLUCOSE SERPL-MCNC: 90 MG/DL (ref 65–99)
HCT VFR BLD AUTO: 46.2 % (ref 37–47)
HDLC SERPL-MCNC: 71 MG/DL
HGB BLD-MCNC: 15.5 G/DL (ref 12–16)
IMM GRANULOCYTES # BLD AUTO: 0.02 K/UL (ref 0–0.11)
IMM GRANULOCYTES NFR BLD AUTO: 0.3 % (ref 0–0.9)
LDLC SERPL CALC-MCNC: 162 MG/DL
LH SERPL-ACNC: 4.6 IU/L
LYMPHOCYTES # BLD AUTO: 2.33 K/UL (ref 1–4.8)
LYMPHOCYTES NFR BLD: 33.8 % (ref 22–41)
MCH RBC QN AUTO: 31 PG (ref 27–33)
MCHC RBC AUTO-ENTMCNC: 33.5 G/DL (ref 33.6–35)
MCV RBC AUTO: 92.4 FL (ref 81.4–97.8)
MONOCYTES # BLD AUTO: 0.36 K/UL (ref 0–0.85)
MONOCYTES NFR BLD AUTO: 5.2 % (ref 0–13.4)
NEUTROPHILS # BLD AUTO: 4.09 K/UL (ref 2–7.15)
NEUTROPHILS NFR BLD: 59.4 % (ref 44–72)
NRBC # BLD AUTO: 0 K/UL
NRBC BLD-RTO: 0 /100 WBC
PLATELET # BLD AUTO: 250 K/UL (ref 164–446)
PMV BLD AUTO: 10.4 FL (ref 9–12.9)
POTASSIUM SERPL-SCNC: 4.1 MMOL/L (ref 3.6–5.5)
PROT SERPL-MCNC: 7.4 G/DL (ref 6–8.2)
RBC # BLD AUTO: 5 M/UL (ref 4.2–5.4)
SODIUM SERPL-SCNC: 139 MMOL/L (ref 135–145)
TRIGL SERPL-MCNC: 72 MG/DL (ref 0–149)
TSH SERPL DL<=0.005 MIU/L-ACNC: 1.92 UIU/ML (ref 0.38–5.33)
WBC # BLD AUTO: 6.9 K/UL (ref 4.8–10.8)

## 2022-09-19 PROCEDURE — 84443 ASSAY THYROID STIM HORMONE: CPT

## 2022-09-19 PROCEDURE — 85025 COMPLETE CBC W/AUTO DIFF WBC: CPT

## 2022-09-19 PROCEDURE — 83002 ASSAY OF GONADOTROPIN (LH): CPT

## 2022-09-19 PROCEDURE — 83001 ASSAY OF GONADOTROPIN (FSH): CPT

## 2022-09-19 PROCEDURE — 82306 VITAMIN D 25 HYDROXY: CPT

## 2022-09-19 PROCEDURE — 80053 COMPREHEN METABOLIC PANEL: CPT

## 2022-09-19 PROCEDURE — 80061 LIPID PANEL: CPT

## 2022-09-19 PROCEDURE — 36415 COLL VENOUS BLD VENIPUNCTURE: CPT

## 2022-09-19 PROCEDURE — 82671 ASSAY OF ESTROGENS: CPT

## 2022-09-24 LAB
ESTRADIOL SERPL HS-MCNC: 122.1 PG/ML
ESTROGEN SERPL CALC-MCNC: 197.7 PG/ML
ESTRONE SERPL-MCNC: 75.6 PG/ML

## 2022-11-04 ENCOUNTER — PATIENT MESSAGE (OUTPATIENT)
Dept: MEDICAL GROUP | Age: 42
End: 2022-11-04
Payer: COMMERCIAL

## 2022-11-04 DIAGNOSIS — F90.0 ATTENTION DEFICIT HYPERACTIVITY DISORDER (ADHD), PREDOMINANTLY INATTENTIVE TYPE: ICD-10-CM

## 2022-11-07 RX ORDER — DEXTROAMPHETAMINE SACCHARATE, AMPHETAMINE ASPARTATE MONOHYDRATE, DEXTROAMPHETAMINE SULFATE AND AMPHETAMINE SULFATE 5; 5; 5; 5 MG/1; MG/1; MG/1; MG/1
20 CAPSULE, EXTENDED RELEASE ORAL EVERY MORNING
Qty: 30 CAPSULE | Refills: 0 | Status: SHIPPED | OUTPATIENT
Start: 2022-11-07 | End: 2022-12-20 | Stop reason: SDUPTHER

## 2022-12-09 ENCOUNTER — HOSPITAL ENCOUNTER (OUTPATIENT)
Dept: LAB | Facility: MEDICAL CENTER | Age: 42
End: 2022-12-09
Attending: INTERNAL MEDICINE
Payer: COMMERCIAL

## 2022-12-09 LAB
T3FREE SERPL-MCNC: 3.03 PG/ML (ref 2–4.4)
T4 SERPL-MCNC: 9.7 UG/DL (ref 4–12)

## 2022-12-09 PROCEDURE — 36415 COLL VENOUS BLD VENIPUNCTURE: CPT

## 2022-12-09 PROCEDURE — 84436 ASSAY OF TOTAL THYROXINE: CPT

## 2022-12-09 PROCEDURE — 84481 FREE ASSAY (FT-3): CPT

## 2022-12-20 DIAGNOSIS — M62.838 MUSCLE SPASM: ICD-10-CM

## 2022-12-20 DIAGNOSIS — F90.0 ATTENTION DEFICIT HYPERACTIVITY DISORDER (ADHD), PREDOMINANTLY INATTENTIVE TYPE: ICD-10-CM

## 2022-12-20 DIAGNOSIS — E03.9 ACQUIRED HYPOTHYROIDISM: ICD-10-CM

## 2022-12-20 RX ORDER — LEVOTHYROXINE SODIUM 0.03 MG/1
25 TABLET ORAL
Qty: 90 TABLET | Refills: 3 | Status: SHIPPED | OUTPATIENT
Start: 2022-12-20 | End: 2023-01-09 | Stop reason: SDUPTHER

## 2022-12-20 RX ORDER — CYCLOBENZAPRINE HCL 10 MG
10 TABLET ORAL 3 TIMES DAILY PRN
Qty: 90 TABLET | Refills: 1 | Status: SHIPPED | OUTPATIENT
Start: 2022-12-20 | End: 2023-01-09 | Stop reason: SDUPTHER

## 2022-12-20 RX ORDER — DEXTROAMPHETAMINE SACCHARATE, AMPHETAMINE ASPARTATE MONOHYDRATE, DEXTROAMPHETAMINE SULFATE AND AMPHETAMINE SULFATE 5; 5; 5; 5 MG/1; MG/1; MG/1; MG/1
20 CAPSULE, EXTENDED RELEASE ORAL EVERY MORNING
Qty: 30 CAPSULE | Refills: 0 | Status: SHIPPED | OUTPATIENT
Start: 2022-12-20 | End: 2023-01-09 | Stop reason: SDUPTHER

## 2022-12-20 RX ORDER — DEXTROAMPHETAMINE SACCHARATE, AMPHETAMINE ASPARTATE, DEXTROAMPHETAMINE SULFATE AND AMPHETAMINE SULFATE 2.5; 2.5; 2.5; 2.5 MG/1; MG/1; MG/1; MG/1
10 TABLET ORAL
Qty: 30 TABLET | Refills: 0 | Status: SHIPPED | OUTPATIENT
Start: 2022-12-20 | End: 2023-01-09 | Stop reason: SDUPTHER

## 2022-12-20 NOTE — TELEPHONE ENCOUNTER
Received request via: Pharmacy    Was the patient seen in the last year in this department? Yes 8/22/22    Does the patient have an active prescription (recently filled or refills available) for medication(s) requested? No    Does the patient have [facility] and need 100 day supply (blood pressure, diabetes and cholesterol meds only)? Patient does not have SCP

## 2023-01-09 DIAGNOSIS — F90.0 ATTENTION DEFICIT HYPERACTIVITY DISORDER (ADHD), PREDOMINANTLY INATTENTIVE TYPE: ICD-10-CM

## 2023-01-09 DIAGNOSIS — M62.838 MUSCLE SPASM: ICD-10-CM

## 2023-01-09 DIAGNOSIS — R21 RASH: ICD-10-CM

## 2023-01-09 DIAGNOSIS — E03.9 ACQUIRED HYPOTHYROIDISM: ICD-10-CM

## 2023-01-10 RX ORDER — DEXTROAMPHETAMINE SACCHARATE, AMPHETAMINE ASPARTATE, DEXTROAMPHETAMINE SULFATE AND AMPHETAMINE SULFATE 2.5; 2.5; 2.5; 2.5 MG/1; MG/1; MG/1; MG/1
10 TABLET ORAL
Qty: 30 TABLET | Refills: 0 | Status: SHIPPED | OUTPATIENT
Start: 2023-01-10 | End: 2023-03-11 | Stop reason: SDUPTHER

## 2023-01-10 RX ORDER — CYCLOBENZAPRINE HCL 10 MG
10 TABLET ORAL 3 TIMES DAILY PRN
Qty: 90 TABLET | Refills: 1 | Status: SHIPPED | OUTPATIENT
Start: 2023-01-10 | End: 2023-03-11 | Stop reason: SDUPTHER

## 2023-01-10 RX ORDER — LEVOTHYROXINE SODIUM 0.03 MG/1
25 TABLET ORAL
Qty: 90 TABLET | Refills: 3 | Status: SHIPPED | OUTPATIENT
Start: 2023-01-10 | End: 2023-03-11 | Stop reason: SDUPTHER

## 2023-01-10 RX ORDER — HYDROQUINONE 40 MG/G
1 CREAM TOPICAL
Qty: 28.35 G | Refills: 2 | Status: SHIPPED | OUTPATIENT
Start: 2023-01-10

## 2023-01-10 RX ORDER — DEXTROAMPHETAMINE SACCHARATE, AMPHETAMINE ASPARTATE MONOHYDRATE, DEXTROAMPHETAMINE SULFATE AND AMPHETAMINE SULFATE 5; 5; 5; 5 MG/1; MG/1; MG/1; MG/1
20 CAPSULE, EXTENDED RELEASE ORAL EVERY MORNING
Qty: 30 CAPSULE | Refills: 0 | Status: SHIPPED | OUTPATIENT
Start: 2023-01-10 | End: 2023-02-09

## 2023-03-31 ENCOUNTER — HOSPITAL ENCOUNTER (OUTPATIENT)
Facility: MEDICAL CENTER | Age: 43
End: 2023-03-31
Attending: PHYSICIAN ASSISTANT
Payer: COMMERCIAL

## 2023-03-31 ENCOUNTER — HOSPITAL ENCOUNTER (OUTPATIENT)
Dept: LAB | Facility: MEDICAL CENTER | Age: 43
End: 2023-03-31
Attending: PHYSICIAN ASSISTANT

## 2023-03-31 LAB
FSH SERPL-ACNC: 7.1 MIU/ML
HCG SERPL QL: NEGATIVE
HCT VFR BLD AUTO: 45 % (ref 37–47)
HGB BLD-MCNC: 15.1 G/DL (ref 12–16)
TSH SERPL DL<=0.005 MIU/L-ACNC: 2.31 UIU/ML (ref 0.38–5.33)

## 2023-03-31 PROCEDURE — 85014 HEMATOCRIT: CPT

## 2023-03-31 PROCEDURE — 36415 COLL VENOUS BLD VENIPUNCTURE: CPT

## 2023-03-31 PROCEDURE — 85018 HEMOGLOBIN: CPT

## 2023-03-31 PROCEDURE — 84443 ASSAY THYROID STIM HORMONE: CPT

## 2023-03-31 PROCEDURE — 84703 CHORIONIC GONADOTROPIN ASSAY: CPT

## 2023-03-31 PROCEDURE — 87624 HPV HI-RISK TYP POOLED RSLT: CPT

## 2023-03-31 PROCEDURE — 83001 ASSAY OF GONADOTROPIN (FSH): CPT

## 2023-03-31 PROCEDURE — 88175 CYTOPATH C/V AUTO FLUID REDO: CPT

## 2023-04-26 DIAGNOSIS — F90.0 ATTENTION DEFICIT HYPERACTIVITY DISORDER (ADHD), PREDOMINANTLY INATTENTIVE TYPE: ICD-10-CM

## 2023-04-26 NOTE — TELEPHONE ENCOUNTER
Received request via: Patient    Was the patient seen in the last year in this department? Yes    Does the patient have an active prescription (recently filled or refills available) for medication(s) requested? No    Does the patient have [facility] and need 100 day supply (blood pressure, diabetes and cholesterol meds only)? Patient does not have SCP       Patient would like Adderall XR 20mg and Adderall 10mg both for (0 days if possible please advise and she is only taking on 20mg and one 10mg she wanted you know that

## 2023-04-27 RX ORDER — DEXTROAMPHETAMINE SACCHARATE, AMPHETAMINE ASPARTATE MONOHYDRATE, DEXTROAMPHETAMINE SULFATE AND AMPHETAMINE SULFATE 5; 5; 5; 5 MG/1; MG/1; MG/1; MG/1
20 CAPSULE, EXTENDED RELEASE ORAL EVERY MORNING
Qty: 30 CAPSULE | Refills: 0 | Status: SHIPPED | OUTPATIENT
Start: 2023-04-27 | End: 2023-05-01

## 2023-04-27 RX ORDER — DEXTROAMPHETAMINE SACCHARATE, AMPHETAMINE ASPARTATE, DEXTROAMPHETAMINE SULFATE AND AMPHETAMINE SULFATE 2.5; 2.5; 2.5; 2.5 MG/1; MG/1; MG/1; MG/1
10 TABLET ORAL
Qty: 90 TABLET | Refills: 0 | Status: SHIPPED
Start: 2023-04-27 | End: 2023-05-01

## 2023-05-01 ENCOUNTER — TELEMEDICINE (OUTPATIENT)
Dept: MEDICAL GROUP | Age: 43
End: 2023-05-01
Payer: COMMERCIAL

## 2023-05-01 VITALS — RESPIRATION RATE: 16 BRPM | HEIGHT: 61 IN | BODY MASS INDEX: 29.27 KG/M2 | WEIGHT: 155 LBS

## 2023-05-01 DIAGNOSIS — F90.0 ATTENTION DEFICIT HYPERACTIVITY DISORDER (ADHD), PREDOMINANTLY INATTENTIVE TYPE: ICD-10-CM

## 2023-05-01 DIAGNOSIS — R11.0 NAUSEA: ICD-10-CM

## 2023-05-01 DIAGNOSIS — Z96.649 STATUS POST HIP REPLACEMENT, UNSPECIFIED LATERALITY: ICD-10-CM

## 2023-05-01 PROCEDURE — 99214 OFFICE O/P EST MOD 30 MIN: CPT | Mod: 95 | Performed by: PHYSICIAN ASSISTANT

## 2023-05-01 RX ORDER — DEXTROAMPHETAMINE SACCHARATE, AMPHETAMINE ASPARTATE, DEXTROAMPHETAMINE SULFATE AND AMPHETAMINE SULFATE 2.5; 2.5; 2.5; 2.5 MG/1; MG/1; MG/1; MG/1
10 TABLET ORAL
Qty: 30 TABLET | Refills: 0 | Status: SHIPPED | OUTPATIENT
Start: 2023-05-31 | End: 2023-06-07 | Stop reason: SDUPTHER

## 2023-05-01 RX ORDER — DEXTROAMPHETAMINE SACCHARATE, AMPHETAMINE ASPARTATE, DEXTROAMPHETAMINE SULFATE AND AMPHETAMINE SULFATE 2.5; 2.5; 2.5; 2.5 MG/1; MG/1; MG/1; MG/1
10 TABLET ORAL
Qty: 30 TABLET | Refills: 0 | Status: SHIPPED | OUTPATIENT
Start: 2023-05-01 | End: 2023-05-31

## 2023-05-01 RX ORDER — DEXTROAMPHETAMINE SACCHARATE, AMPHETAMINE ASPARTATE MONOHYDRATE, DEXTROAMPHETAMINE SULFATE AND AMPHETAMINE SULFATE 5; 5; 5; 5 MG/1; MG/1; MG/1; MG/1
20 CAPSULE, EXTENDED RELEASE ORAL EVERY MORNING
Qty: 30 CAPSULE | Refills: 0 | Status: SHIPPED | OUTPATIENT
Start: 2023-06-30 | End: 2023-07-30

## 2023-05-01 RX ORDER — DEXTROAMPHETAMINE SACCHARATE, AMPHETAMINE ASPARTATE, DEXTROAMPHETAMINE SULFATE AND AMPHETAMINE SULFATE 2.5; 2.5; 2.5; 2.5 MG/1; MG/1; MG/1; MG/1
10 TABLET ORAL
Qty: 30 TABLET | Refills: 0 | Status: SHIPPED | OUTPATIENT
Start: 2023-06-30 | End: 2023-07-30

## 2023-05-01 RX ORDER — ONDANSETRON 4 MG/1
4 TABLET, FILM COATED ORAL EVERY 4 HOURS PRN
Qty: 10 TABLET | Refills: 0 | Status: SHIPPED | OUTPATIENT
Start: 2023-05-01 | End: 2023-05-04

## 2023-05-01 RX ORDER — DEXTROAMPHETAMINE SACCHARATE, AMPHETAMINE ASPARTATE MONOHYDRATE, DEXTROAMPHETAMINE SULFATE AND AMPHETAMINE SULFATE 5; 5; 5; 5 MG/1; MG/1; MG/1; MG/1
20 CAPSULE, EXTENDED RELEASE ORAL EVERY MORNING
Qty: 30 CAPSULE | Refills: 0 | Status: SHIPPED | OUTPATIENT
Start: 2023-05-01 | End: 2023-05-31

## 2023-05-01 RX ORDER — MELOXICAM 7.5 MG/1
7.5 TABLET ORAL
Qty: 30 TABLET | Refills: 2 | Status: SHIPPED | OUTPATIENT
Start: 2023-05-01 | End: 2023-06-06 | Stop reason: SDUPTHER

## 2023-05-01 RX ORDER — MELOXICAM 7.5 MG/5ML
SUSPENSION ORAL
COMMUNITY
Start: 2022-12-05 | End: 2023-05-01

## 2023-05-01 RX ORDER — DEXTROAMPHETAMINE SACCHARATE, AMPHETAMINE ASPARTATE MONOHYDRATE, DEXTROAMPHETAMINE SULFATE AND AMPHETAMINE SULFATE 5; 5; 5; 5 MG/1; MG/1; MG/1; MG/1
20 CAPSULE, EXTENDED RELEASE ORAL EVERY MORNING
Qty: 30 CAPSULE | Refills: 0 | Status: SHIPPED | OUTPATIENT
Start: 2023-05-31 | End: 2023-06-30

## 2023-05-01 ASSESSMENT — FIBROSIS 4 INDEX: FIB4 SCORE: .64

## 2023-05-01 ASSESSMENT — PATIENT HEALTH QUESTIONNAIRE - PHQ9: CLINICAL INTERPRETATION OF PHQ2 SCORE: 0

## 2023-05-02 NOTE — PROGRESS NOTES
Virtual Visit: Established Patient   This visit was conducted via Zoom using secure and encrypted videoconferencing technology.   The patient was in their home in the [facility] [place].    The patient's identity was confirmed and verbal consent was obtained for this virtual visit.     Subjective:   CC:   Chief Complaint   Patient presents with    Medication Refill    Other     Discuss all medications and wants pain meds for a procedure     PCP Dr. Esqueda-unable to see today  Sara Taylor is a 42 y.o. female presenting for medication review.  She currently takes 20 mg extended release Adderall, in addition to 10 mg Adderall tablets in the morning.  This dosing regiment  is effective for her.  She does take this almost daily.  It has been a little over 6 months since she has been seen by PCP, thus was advised to schedule med check.  She does need refill of these medications today.    She is also wondering if she can get a prescription for meloxicam.  She is status post hip replacement.  Overall pain has significantly proved.  However there are still some days that she does have pain, meloxicam has been very effective in the past.    She is also scheduled for endometrial biopsy.  She states that anytime she has biopsies done or pelvic exams she immediately will pass out.  She did discuss this with her gynecologist, they deferred medications to PCP.  In discussion she did have natural childbirth, did not have any syncopal episodes with this.  Does not have a low pain tolerance.  Possibly could be more anxiety/vasovagal episodes.  But if she does have the syncopal event she is then nauseated for the next few days.  She does have 2 mg Valium prescribed by her PCP.  This does  relax her very well.    ROS   See above    Current medicines (including changes today)  Current Outpatient Medications   Medication Sig Dispense Refill    meloxicam (MOBIC) 7.5 MG Tab Take 1 Tablet by mouth 1 time a day as needed for Moderate Pain.  "30 Tablet 2    ondansetron (ZOFRAN) 4 MG Tab tablet Take 1 Tablet by mouth every four hours as needed for Nausea/Vomiting for up to 3 days. 10 Tablet 0    amphetamine-dextroamphetamine (ADDERALL XR) 20 MG per XR capsule Take 1 Capsule by mouth every morning for 30 days. 30 Capsule 0    [START ON 5/31/2023] amphetamine-dextroamphetamine (ADDERALL XR) 20 MG per XR capsule Take 1 Capsule by mouth every morning for 30 days. 30 Capsule 0    [START ON 6/30/2023] amphetamine-dextroamphetamine (ADDERALL XR) 20 MG per XR capsule Take 1 Capsule by mouth every morning for 30 days. 30 Capsule 0    amphetamine-dextroamphetamine (ADDERALL) 10 MG Tab Take 1 Tablet by mouth 1 time a day as needed (EACH AFTERNOON PRN ADD/ADHD EA) for up to 30 days. 30 Tablet 0    [START ON 5/31/2023] amphetamine-dextroamphetamine (ADDERALL) 10 MG Tab Take 1 Tablet by mouth 1 time a day as needed (EACH AFTERNOON PRN ADD/ADHD EA) for up to 30 days. 30 Tablet 0    [START ON 6/30/2023] amphetamine-dextroamphetamine (ADDERALL) 10 MG Tab Take 1 Tablet by mouth 1 time a day as needed (EACH AFTERNOON PRN ADD/ADHD EA) for up to 30 days. 30 Tablet 0    cyclobenzaprine (FLEXERIL) 10 mg Tab Take 1 Tablet by mouth 3 times a day as needed for Muscle Spasms. 90 Tablet 0    levothyroxine (SYNTHROID) 25 MCG Tab Take 1 Tablet by mouth every morning on an empty stomach. 90 Tablet 0    hydroquinone 4 % cream Apply 1 Application topically 1 time a day as needed (rash). 28.35 g 2    Adapalene 0.3 % Gel       oxyCODONE-acetaminophen (PERCOCET) 5-325 MG Tab       B-D 3CC LUER-CUBA SYR 25GX1\" 25G X 1\" 3 ML Misc       Probiotic Product (PROBIOTIC PO) Take 1 Capsule by mouth every day.      Ferrous Sulfate (IRON PO) Take 325 mg by mouth 1 time a day as needed. During menstrual cycle      FIBER PO Take 1 Scoop by mouth every day.      diazePAM (VALIUM) 2 MG Tab Take 2 mg by mouth at bedtime as needed for Anxiety. During menstrual cycle.      Cholecalciferol (VITAMIN D) 50 MCG " "(2000 UT) Cap Take 1 Capsule by mouth every day. 100 Capsule 3    Cholecalciferol (VITAMIN D3) 5000 units Tab Take 5,000 Units by mouth every day. 30 Tab      No current facility-administered medications for this visit.       Patient Active Problem List    Diagnosis Date Noted    S/P hip replacement 05/01/2023    Acquired hypothyroidism 10/15/2020    B12 deficiency 10/15/2020    Eyelid disorder 10/15/2020    Anxiety 03/29/2018    Tension headache 03/29/2018    Attention deficit hyperactivity disorder (ADHD), predominantly inattentive type 03/29/2018    Multiple allergies 03/29/2018    Vitamin D deficiency disease 08/15/2016    Migraine with aura and without status migrainosus, not intractable 06/09/2015    Mixed hyperlipidemia 10/07/2013        Objective:   Resp 16   Ht 1.549 m (5' 1\")   Wt 70.3 kg (155 lb)   BMI 29.29 kg/m²     Physical Exam:  Constitutional: Alert, no distress, well-groomed.  Skin: No rashes in visible areas.  Eye: Round. Conjunctiva clear, lids normal. No icterus.   ENMT: Lips pink without lesions, good dentition, moist mucous membranes. Phonation normal.  Neck: No masses, no thyromegaly. Moves freely without pain.  Respiratory: Unlabored respiratory effort, no cough or audible wheeze  Psych: Alert and oriented x3, normal affect and mood.     Assessment and Plan:   The following treatment plan was discussed:     1. Attention deficit hyperactivity disorder (ADHD), predominantly inattentive type  -Stable.  Continue 10 mg extended release Adderall in addition to 10 mg Adderall in the a.m. as needed.  PDMP checked.  Prescription sent to pharmacy.  Follow-up with PCP in 3 months for continued medication management.  - amphetamine-dextroamphetamine (ADDERALL XR) 20 MG per XR capsule; Take 1 Capsule by mouth every morning for 30 days.  Dispense: 30 Capsule; Refill: 0  - amphetamine-dextroamphetamine (ADDERALL XR) 20 MG per XR capsule; Take 1 Capsule by mouth every morning for 30 days.  Dispense: 30 " Capsule; Refill: 0  - amphetamine-dextroamphetamine (ADDERALL XR) 20 MG per XR capsule; Take 1 Capsule by mouth every morning for 30 days.  Dispense: 30 Capsule; Refill: 0  - amphetamine-dextroamphetamine (ADDERALL) 10 MG Tab; Take 1 Tablet by mouth 1 time a day as needed (EACH AFTERNOON PRN ADD/ADHD EA) for up to 30 days.  Dispense: 30 Tablet; Refill: 0  - amphetamine-dextroamphetamine (ADDERALL) 10 MG Tab; Take 1 Tablet by mouth 1 time a day as needed (EACH AFTERNOON PRN ADD/ADHD EA) for up to 30 days.  Dispense: 30 Tablet; Refill: 0  - amphetamine-dextroamphetamine (ADDERALL) 10 MG Tab; Take 1 Tablet by mouth 1 time a day as needed (EACH AFTERNOON PRN ADD/ADHD EA) for up to 30 days.  Dispense: 30 Tablet; Refill: 0    2. Nausea  -In discussion with patient that does not seem to be pain related thus I do not believe pain medications would be effective.  She does have 2 mg Valium on hand, did advise to trial and take this about half hour before her procedure.  She does have her  driving her.  Hopefully this will prevent the event.  She does C have a syncopal event with continued nausea  afterward Zofran given.  - ondansetron (ZOFRAN) 4 MG Tab tablet; Take 1 Tablet by mouth every four hours as needed for Nausea/Vomiting for up to 3 days.  Dispense: 10 Tablet; Refill: 0    3. Status post hip replacement, unspecified laterality  -Helps with pain control.  Meloxicam given  - meloxicam (MOBIC) 7.5 MG Tab; Take 1 Tablet by mouth 1 time a day as needed for Moderate Pain.  Dispense: 30 Tablet; Refill: 2      Follow-up: Return in about 3 months (around 8/1/2023) for Medication Check w/pcp.

## 2023-05-15 ENCOUNTER — APPOINTMENT (OUTPATIENT)
Dept: RADIOLOGY | Facility: MEDICAL CENTER | Age: 43
End: 2023-05-15
Attending: PHYSICIAN ASSISTANT
Payer: COMMERCIAL

## 2023-06-06 DIAGNOSIS — E53.8 B12 DEFICIENCY: ICD-10-CM

## 2023-06-06 DIAGNOSIS — Z96.649 STATUS POST HIP REPLACEMENT, UNSPECIFIED LATERALITY: ICD-10-CM

## 2023-06-06 DIAGNOSIS — E03.9 ACQUIRED HYPOTHYROIDISM: ICD-10-CM

## 2023-06-06 DIAGNOSIS — M62.838 MUSCLE SPASM: ICD-10-CM

## 2023-06-06 DIAGNOSIS — F41.9 ANXIETY: ICD-10-CM

## 2023-06-06 RX ORDER — MELOXICAM 7.5 MG/1
7.5 TABLET ORAL
Qty: 30 TABLET | Refills: 2 | Status: SHIPPED | OUTPATIENT
Start: 2023-06-06 | End: 2024-01-12

## 2023-06-06 RX ORDER — DIAZEPAM 2 MG/1
2 TABLET ORAL NIGHTLY PRN
Qty: 30 TABLET | Refills: 0 | Status: SHIPPED | OUTPATIENT
Start: 2023-06-06 | End: 2023-07-06

## 2023-06-06 RX ORDER — LEVOTHYROXINE SODIUM 0.03 MG/1
25 TABLET ORAL
Qty: 90 TABLET | Refills: 0 | Status: SHIPPED | OUTPATIENT
Start: 2023-06-06

## 2023-06-06 RX ORDER — CYANOCOBALAMIN 1000 UG/ML
1000 INJECTION, SOLUTION INTRAMUSCULAR; SUBCUTANEOUS
Qty: 1 ML | Refills: 0 | Status: SHIPPED | OUTPATIENT
Start: 2023-06-06 | End: 2023-07-17

## 2023-06-06 RX ORDER — CYCLOBENZAPRINE HCL 10 MG
10 TABLET ORAL 3 TIMES DAILY PRN
Qty: 90 TABLET | Refills: 0 | Status: SHIPPED | OUTPATIENT
Start: 2023-06-06

## 2023-06-06 NOTE — TELEPHONE ENCOUNTER
Patient needs her adderal in tablet form of 20 mg the pharmacy has a [place] wide shortage on the 20mg XR does adderal come just regular 20 mg please advise

## 2023-06-07 DIAGNOSIS — F90.0 ATTENTION DEFICIT HYPERACTIVITY DISORDER (ADHD), PREDOMINANTLY INATTENTIVE TYPE: ICD-10-CM

## 2023-06-07 RX ORDER — DEXTROAMPHETAMINE SACCHARATE, AMPHETAMINE ASPARTATE, DEXTROAMPHETAMINE SULFATE AND AMPHETAMINE SULFATE 2.5; 2.5; 2.5; 2.5 MG/1; MG/1; MG/1; MG/1
10 TABLET ORAL 2 TIMES DAILY
Qty: 60 TABLET | Refills: 0 | Status: SHIPPED | OUTPATIENT
Start: 2023-08-07 | End: 2023-09-06

## 2023-06-07 RX ORDER — DEXTROAMPHETAMINE SACCHARATE, AMPHETAMINE ASPARTATE, DEXTROAMPHETAMINE SULFATE AND AMPHETAMINE SULFATE 2.5; 2.5; 2.5; 2.5 MG/1; MG/1; MG/1; MG/1
10 TABLET ORAL 2 TIMES DAILY
Qty: 60 TABLET | Refills: 0 | Status: SHIPPED | OUTPATIENT
Start: 2023-07-07 | End: 2023-08-06

## 2023-06-07 RX ORDER — DEXTROAMPHETAMINE SACCHARATE, AMPHETAMINE ASPARTATE, DEXTROAMPHETAMINE SULFATE AND AMPHETAMINE SULFATE 2.5; 2.5; 2.5; 2.5 MG/1; MG/1; MG/1; MG/1
10 TABLET ORAL 2 TIMES DAILY
Qty: 60 TABLET | Refills: 0 | Status: SHIPPED | OUTPATIENT
Start: 2023-06-07 | End: 2023-07-07

## 2023-06-12 ENCOUNTER — APPOINTMENT (OUTPATIENT)
Dept: RADIOLOGY | Facility: MEDICAL CENTER | Age: 43
End: 2023-06-12
Attending: PHYSICIAN ASSISTANT
Payer: COMMERCIAL

## 2023-06-16 ENCOUNTER — APPOINTMENT (RX ONLY)
Dept: URBAN - METROPOLITAN AREA CLINIC 20 | Facility: CLINIC | Age: 43
Setting detail: DERMATOLOGY
End: 2023-06-16

## 2023-06-16 DIAGNOSIS — D18.0 HEMANGIOMA: ICD-10-CM

## 2023-06-16 DIAGNOSIS — Z71.89 OTHER SPECIFIED COUNSELING: ICD-10-CM

## 2023-06-16 DIAGNOSIS — L82.1 OTHER SEBORRHEIC KERATOSIS: ICD-10-CM

## 2023-06-16 DIAGNOSIS — D22 MELANOCYTIC NEVI: ICD-10-CM

## 2023-06-16 DIAGNOSIS — L81.4 OTHER MELANIN HYPERPIGMENTATION: ICD-10-CM

## 2023-06-16 PROBLEM — D22.72 MELANOCYTIC NEVI OF LEFT LOWER LIMB, INCLUDING HIP: Status: ACTIVE | Noted: 2023-06-16

## 2023-06-16 PROBLEM — D22.71 MELANOCYTIC NEVI OF RIGHT LOWER LIMB, INCLUDING HIP: Status: ACTIVE | Noted: 2023-06-16

## 2023-06-16 PROBLEM — D22.62 MELANOCYTIC NEVI OF LEFT UPPER LIMB, INCLUDING SHOULDER: Status: ACTIVE | Noted: 2023-06-16

## 2023-06-16 PROBLEM — D18.01 HEMANGIOMA OF SKIN AND SUBCUTANEOUS TISSUE: Status: ACTIVE | Noted: 2023-06-16

## 2023-06-16 PROBLEM — D22.39 MELANOCYTIC NEVI OF OTHER PARTS OF FACE: Status: ACTIVE | Noted: 2023-06-16

## 2023-06-16 PROBLEM — D22.61 MELANOCYTIC NEVI OF RIGHT UPPER LIMB, INCLUDING SHOULDER: Status: ACTIVE | Noted: 2023-06-16

## 2023-06-16 PROBLEM — D22.5 MELANOCYTIC NEVI OF TRUNK: Status: ACTIVE | Noted: 2023-06-16

## 2023-06-16 PROCEDURE — ? SUNSCREEN RECOMMENDATIONS

## 2023-06-16 PROCEDURE — ? COUNSELING

## 2023-06-16 PROCEDURE — 99213 OFFICE O/P EST LOW 20 MIN: CPT

## 2023-06-16 ASSESSMENT — LOCATION DETAILED DESCRIPTION DERM
LOCATION DETAILED: LEFT DISTAL POSTERIOR THIGH
LOCATION DETAILED: RIGHT DISTAL POSTERIOR THIGH
LOCATION DETAILED: RIGHT INFERIOR CENTRAL MALAR CHEEK
LOCATION DETAILED: RIGHT DISTAL POSTERIOR UPPER ARM
LOCATION DETAILED: RIGHT SUPERIOR UPPER BACK
LOCATION DETAILED: RIGHT INFERIOR MEDIAL UPPER BACK
LOCATION DETAILED: LEFT VENTRAL PROXIMAL FOREARM
LOCATION DETAILED: RIGHT VENTRAL PROXIMAL FOREARM
LOCATION DETAILED: RIGHT PROXIMAL PRETIBIAL REGION
LOCATION DETAILED: INFERIOR THORACIC SPINE
LOCATION DETAILED: RIGHT INFERIOR FOREHEAD
LOCATION DETAILED: RIGHT INFERIOR MEDIAL MIDBACK
LOCATION DETAILED: LEFT LATERAL PROXIMAL PRETIBIAL REGION
LOCATION DETAILED: LEFT PROXIMAL POSTERIOR UPPER ARM

## 2023-06-16 ASSESSMENT — LOCATION ZONE DERM
LOCATION ZONE: LEG
LOCATION ZONE: FACE
LOCATION ZONE: TRUNK
LOCATION ZONE: ARM

## 2023-06-16 ASSESSMENT — LOCATION SIMPLE DESCRIPTION DERM
LOCATION SIMPLE: LEFT POSTERIOR UPPER ARM
LOCATION SIMPLE: RIGHT UPPER BACK
LOCATION SIMPLE: RIGHT PRETIBIAL REGION
LOCATION SIMPLE: RIGHT POSTERIOR UPPER ARM
LOCATION SIMPLE: RIGHT FOREHEAD
LOCATION SIMPLE: RIGHT POSTERIOR THIGH
LOCATION SIMPLE: RIGHT LOWER BACK
LOCATION SIMPLE: LEFT FOREARM
LOCATION SIMPLE: LEFT POSTERIOR THIGH
LOCATION SIMPLE: RIGHT FOREARM
LOCATION SIMPLE: LEFT PRETIBIAL REGION
LOCATION SIMPLE: UPPER BACK
LOCATION SIMPLE: RIGHT CHEEK

## 2023-07-14 DIAGNOSIS — E53.8 B12 DEFICIENCY: ICD-10-CM

## 2023-07-17 ENCOUNTER — APPOINTMENT (RX ONLY)
Dept: URBAN - METROPOLITAN AREA CLINIC 20 | Facility: CLINIC | Age: 43
Setting detail: DERMATOLOGY
End: 2023-07-17

## 2023-07-17 DIAGNOSIS — L91.0 HYPERTROPHIC SCAR: ICD-10-CM

## 2023-07-17 DIAGNOSIS — L57.0 ACTINIC KERATOSIS: ICD-10-CM

## 2023-07-17 DIAGNOSIS — L81.9 DISORDER OF PIGMENTATION, UNSPECIFIED: ICD-10-CM

## 2023-07-17 DIAGNOSIS — Z71.89 OTHER SPECIFIED COUNSELING: ICD-10-CM

## 2023-07-17 PROCEDURE — ? MEDICATION COUNSELING

## 2023-07-17 PROCEDURE — ? COUNSELING

## 2023-07-17 PROCEDURE — 17003 DESTRUCT PREMALG LES 2-14: CPT

## 2023-07-17 PROCEDURE — ? INTRALESIONAL KENALOG

## 2023-07-17 PROCEDURE — ? ADDITIONAL NOTES

## 2023-07-17 PROCEDURE — ? PRESCRIPTION

## 2023-07-17 PROCEDURE — 99212 OFFICE O/P EST SF 10 MIN: CPT | Mod: 25

## 2023-07-17 PROCEDURE — ? LIQUID NITROGEN

## 2023-07-17 PROCEDURE — 17000 DESTRUCT PREMALG LESION: CPT

## 2023-07-17 PROCEDURE — 11900 INJECT SKIN LESIONS </W 7: CPT | Mod: 59

## 2023-07-17 RX ORDER — HYDROQUINONE 4 %
CREAM (GRAM) TOPICAL
Qty: 1 | Refills: 2 | Status: ERX

## 2023-07-17 RX ORDER — CYANOCOBALAMIN 1000 UG/ML
INJECTION, SOLUTION INTRAMUSCULAR; SUBCUTANEOUS
Qty: 1 ML | Refills: 0 | Status: SHIPPED | OUTPATIENT
Start: 2023-07-17 | End: 2023-08-14

## 2023-07-17 ASSESSMENT — LOCATION ZONE DERM
LOCATION ZONE: FACE
LOCATION ZONE: ARM

## 2023-07-17 ASSESSMENT — LOCATION DETAILED DESCRIPTION DERM
LOCATION DETAILED: RIGHT MID TEMPLE
LOCATION DETAILED: RIGHT SUPERIOR TEMPLE
LOCATION DETAILED: RIGHT LATERAL MALAR CHEEK
LOCATION DETAILED: LEFT MID TEMPLE
LOCATION DETAILED: LEFT INFERIOR CENTRAL MALAR CHEEK
LOCATION DETAILED: RIGHT DISTAL DORSAL FOREARM

## 2023-07-17 ASSESSMENT — LOCATION SIMPLE DESCRIPTION DERM
LOCATION SIMPLE: RIGHT CHEEK
LOCATION SIMPLE: LEFT TEMPLE
LOCATION SIMPLE: RIGHT FOREARM
LOCATION SIMPLE: RIGHT TEMPLE
LOCATION SIMPLE: LEFT CHEEK

## 2023-07-17 NOTE — PROCEDURE: LIQUID NITROGEN
Show Aperture Variable?: Yes
Consent: The patient's consent was obtained including but not limited to risks of crusting, scabbing, blistering, scarring, darker or lighter pigmentary change, recurrence, incomplete removal and infection. RTC in 2 months if lesion(s) persistent.
Detail Level: Detailed
Duration Of Freeze Thaw-Cycle (Seconds): 10
Post-Care Instructions: I reviewed with the patient in detail post-care instructions. Patient is to wear sunprotection, and avoid picking at any of the treated lesions. Pt may apply Vaseline to crusted or scabbing areas.
Number Of Freeze-Thaw Cycles: 2 freeze-thaw cycles
Render Note In Bullet Format When Appropriate: No

## 2023-07-17 NOTE — PROCEDURE: INTRALESIONAL KENALOG
Ndc# For Kenalog Only: 7902-2031-41
How Many Mls Were Removed From The 10 Mg/Ml (5ml) Vial When Preparing The Injectable Solution?: 0
Include Z78.9 (Other Specified Conditions Influencing Health Status) As An Associated Diagnosis?: Yes
Require Ndc Code?: No
Kenalog Preparation: Kenalog
Concentration Of Kenalog Solution Injected (Mg/Ml): 40.0
Total Volume (Ccs): 0.2
Treatment Number (Optional): 3
Medical Necessity Clause: This procedure was medically necessary because the lesions that were treated were:
Kenalog Type Of Vial: Multiple Dose
Which Kenalog Vial Was Used?: Kenalog 40 mg/ml (1 ml vial)
Expiration Date For Kenalog (Optional): 09/2023
Lot # For Kenalog (Optional): [id number]
Detail Level: Detailed
How Many Mls Were Removed From The 40 Mg/Ml (1ml) Vial When Preparing The Injectable Solution?: 1
Administered By (Optional): Mayra WATERMAN
Consent: The risks of atrophy, bleeding, scarring and infection were reviewed with the patient. Re-treatment may be necessary.

## 2023-07-17 NOTE — PROCEDURE: ADDITIONAL NOTES
Additional Notes: Pt seeing Jeana for laser to help with redness.
Detail Level: Detailed
Render Risk Assessment In Note?: no

## 2023-08-14 ENCOUNTER — TELEMEDICINE (OUTPATIENT)
Dept: MEDICAL GROUP | Age: 43
End: 2023-08-14
Payer: COMMERCIAL

## 2023-08-14 VITALS — BODY MASS INDEX: 29.27 KG/M2 | HEIGHT: 61 IN | RESPIRATION RATE: 12 BRPM | WEIGHT: 155 LBS

## 2023-08-14 DIAGNOSIS — E55.9 VITAMIN D DEFICIENCY: ICD-10-CM

## 2023-08-14 DIAGNOSIS — Z00.00 BLOOD TESTS FOR ROUTINE GENERAL PHYSICAL EXAMINATION: ICD-10-CM

## 2023-08-14 DIAGNOSIS — E53.8 B12 DEFICIENCY: ICD-10-CM

## 2023-08-14 DIAGNOSIS — E78.2 MIXED HYPERLIPIDEMIA: ICD-10-CM

## 2023-08-14 DIAGNOSIS — F90.0 ATTENTION DEFICIT HYPERACTIVITY DISORDER (ADHD), PREDOMINANTLY INATTENTIVE TYPE: ICD-10-CM

## 2023-08-14 DIAGNOSIS — E53.8 VITAMIN B 12 DEFICIENCY: ICD-10-CM

## 2023-08-14 PROCEDURE — 99214 OFFICE O/P EST MOD 30 MIN: CPT | Mod: 95 | Performed by: PHYSICIAN ASSISTANT

## 2023-08-14 RX ORDER — LISDEXAMFETAMINE DIMESYLATE 10 MG/1
20 CAPSULE ORAL DAILY
Qty: 60 CAPSULE | Refills: 0 | Status: SHIPPED | OUTPATIENT
Start: 2023-08-14 | End: 2023-09-22 | Stop reason: SDUPTHER

## 2023-08-14 RX ORDER — CYANOCOBALAMIN 1000 UG/ML
INJECTION, SOLUTION INTRAMUSCULAR; SUBCUTANEOUS
Qty: 1 ML | Refills: 0 | Status: SHIPPED | OUTPATIENT
Start: 2023-08-14 | End: 2024-03-11

## 2023-08-14 ASSESSMENT — FIBROSIS 4 INDEX: FIB4 SCORE: .656

## 2023-08-14 NOTE — PROGRESS NOTES
Virtual Visit: Established Patient   This visit was conducted via Zoom using secure and encrypted videoconferencing technology.   The patient was in their home in the [facility].    The patient's identity was confirmed and verbal consent was obtained for this virtual visit.     Subjective:   CC:   Chief Complaint   Patient presents with    Follow-Up     Medication refills        Sara Taylor is a 43 y.o. female presenting for medication review.  Unable to see PCP today.    Has been prescribed 20 mg extended release Adderall, in addition to 10 mg Adderall tablets in the afternoon if needed.  This dosing regiment  is effective for her.  Unfortunately due to the shortage she has not been able to get the 20 mg extended release Adderall.  She has been taking 2-10 mg tablets, she does not like the way she feels on the Adderall, who comes off of it very quickly, does not provide as much benefit as the extended release.  Would be interested in trialing other options such as Vyvanse.    She is also requesting refill of vitamin B12 injections, has been over 6 months since use had injected, over a year since she had vitamin B12 level checked.    ROS   See above    Current medicines (including changes today)  Current Outpatient Medications   Medication Sig Dispense Refill    Lisdexamfetamine Dimesylate (VYVANSE) 10 MG Cap Take 20 mg by mouth every day for 30 days. 60 Capsule 0    cyanocobalamin (VITAMIN B-12) 1000 MCG/ML Solution INJECT 1ML INTO THE SHOULDER, THIGH OR BUTTOCKS EVERY 30 DAYS 1 mL 0    amphetamine-dextroamphetamine (ADDERALL) 10 MG Tab Take 1 Tablet by mouth 2 times a day for 30 days. 60 Tablet 0    levothyroxine (SYNTHROID) 25 MCG Tab Take 1 Tablet by mouth every morning on an empty stomach. 90 Tablet 0    meloxicam (MOBIC) 7.5 MG Tab Take 1 Tablet by mouth 1 time a day as needed for Moderate Pain. 30 Tablet 2    cyclobenzaprine (FLEXERIL) 10 mg Tab Take 1 Tablet by mouth 3 times a day as needed for  "Muscle Spasms. 90 Tablet 0    hydroquinone 4 % cream Apply 1 Application topically 1 time a day as needed (rash). 28.35 g 2    Adapalene 0.3 % Gel       oxyCODONE-acetaminophen (PERCOCET) 5-325 MG Tab       B-D 3CC LUER-CUBA SYR 25GX1\" 25G X 1\" 3 ML Misc       Probiotic Product (PROBIOTIC PO) Take 1 Capsule by mouth every day.      Ferrous Sulfate (IRON PO) Take 325 mg by mouth 1 time a day as needed. During menstrual cycle      FIBER PO Take 1 Scoop by mouth every day.      Cholecalciferol (VITAMIN D) 50 MCG (2000 UT) Cap Take 1 Capsule by mouth every day. 100 Capsule 3    Cholecalciferol (VITAMIN D3) 5000 units Tab Take 5,000 Units by mouth every day. 30 Tab      No current facility-administered medications for this visit.       Patient Active Problem List    Diagnosis Date Noted    S/P hip replacement 05/01/2023    Acquired hypothyroidism 10/15/2020    B12 deficiency 10/15/2020    Eyelid disorder 10/15/2020    Anxiety 03/29/2018    Tension headache 03/29/2018    Attention deficit hyperactivity disorder (ADHD), predominantly inattentive type 03/29/2018    Multiple allergies 03/29/2018    Vitamin D deficiency disease 08/15/2016    Migraine with aura and without status migrainosus, not intractable 06/09/2015    Mixed hyperlipidemia 10/07/2013        Objective:   Resp 12   Ht 1.549 m (5' 1\")   Wt 70.3 kg (155 lb)   BMI 29.29 kg/m²     Physical Exam:  Constitutional: Alert, no distress, well-groomed.  Skin: No rashes in visible areas.  Eye: Round. Conjunctiva clear, lids normal. No icterus.   ENMT: Lips pink without lesions, good dentition, moist mucous membranes. Phonation normal.  Neck: No masses, no thyromegaly. Moves freely without pain.  Respiratory: Unlabored respiratory effort, no cough or audible wheeze  Psych: Alert and oriented x3, normal affect and mood.     Assessment and Plan:   The following treatment plan was discussed:     1. Attention deficit hyperactivity disorder (ADHD), predominantly inattentive " type  -Has not been able to get extended release Adderall due to this ordered, short release is not providing much benefit, does not tolerate well.  We will start on trial of 10 mg of Vyvanse, advised she can titrate up to total of 30 mg prior to next visit.  PDMP checked.  Prescription sent to pharmacy  - Lisdexamfetamine Dimesylate (VYVANSE) 10 MG Cap; Take 20 mg by mouth every day for 30 days.  Dispense: 60 Capsule; Refill: 0    2. Vitamin B 12 deficiency  -Prior history, has been over 6 months since she has had an injection.  We will check vitamin B12 level, if deficient we will continue on injections.  - CBC WITH DIFFERENTIAL; Future  - VITAMIN B12; Future    3. Mixed hyperlipidemia  -Previously moderately elevated.  We will check lipid panel.  Further treatment as needed pending results  - Lipid Profile; Future    4. Vitamin D deficiency disease  - VITAMIN D,25 HYDROXY (DEFICIENCY); Future    5. Blood tests for routine general physical examination  - CBC WITH DIFFERENTIAL; Future  - Comp Metabolic Panel; Future  - Lipid Profile; Future  - TSH WITH REFLEX TO FT4; Future  - VITAMIN B12; Future      Follow-up: Return in about 3 weeks (around 9/4/2023) for Medication Check, Lab Review.

## 2023-09-15 ENCOUNTER — TELEMEDICINE (OUTPATIENT)
Dept: MEDICAL GROUP | Age: 43
End: 2023-09-15
Payer: COMMERCIAL

## 2023-09-15 VITALS — RESPIRATION RATE: 16 BRPM | HEIGHT: 61 IN | BODY MASS INDEX: 30.96 KG/M2 | WEIGHT: 164 LBS

## 2023-09-15 DIAGNOSIS — F90.0 ATTENTION DEFICIT HYPERACTIVITY DISORDER (ADHD), PREDOMINANTLY INATTENTIVE TYPE: ICD-10-CM

## 2023-09-15 DIAGNOSIS — N92.6 IRREGULAR MENSES: ICD-10-CM

## 2023-09-15 DIAGNOSIS — Z12.31 VISIT FOR SCREENING MAMMOGRAM: ICD-10-CM

## 2023-09-15 PROCEDURE — 99215 OFFICE O/P EST HI 40 MIN: CPT | Mod: 95 | Performed by: PHYSICIAN ASSISTANT

## 2023-09-15 RX ORDER — LISDEXAMFETAMINE DIMESYLATE 30 MG/1
30 CAPSULE ORAL EVERY MORNING
Qty: 30 CAPSULE | Refills: 0 | Status: SHIPPED | OUTPATIENT
Start: 2023-09-15 | End: 2023-10-15

## 2023-09-15 RX ORDER — DIAZEPAM 2 MG/1
2 TABLET ORAL
COMMUNITY
End: 2023-10-18

## 2023-09-15 RX ORDER — LISDEXAMFETAMINE DIMESYLATE 30 MG/1
30 CAPSULE ORAL EVERY MORNING
Qty: 30 CAPSULE | Refills: 0 | Status: SHIPPED | OUTPATIENT
Start: 2023-11-14 | End: 2023-12-14

## 2023-09-15 RX ORDER — LISDEXAMFETAMINE DIMESYLATE 30 MG/1
30 CAPSULE ORAL EVERY MORNING
Qty: 30 CAPSULE | Refills: 0 | Status: SHIPPED | OUTPATIENT
Start: 2023-10-15 | End: 2023-11-14

## 2023-09-15 ASSESSMENT — FIBROSIS 4 INDEX: FIB4 SCORE: .656

## 2023-09-15 NOTE — PROGRESS NOTES
Virtual Visit: Established Patient   This visit was conducted via Zoom using secure and encrypted videoconferencing technology.   The patient was in their home in the [facility].    The patient's identity was confirmed and verbal consent was obtained for this virtual visit.     Subjective:   CC:   Chief Complaint   Patient presents with    Medication Follow-up    Requesting Labs     Mammogram an blood work     PCP-Dr. Esqueda, unable to see today    Sara Taylor is a 43 y.o. female presenting for medication review.  Due to the Adderall shortage  she was having difficulties getting this filled, did not tolerate the short release Adderall, was switched to 10 mg of Vyvanse, advised that she could titrate up to total of 30..  She has been taking 30 mg of the Vyvanse for the past 5 days.  She is overall tolerating medication really well, likely she was on it.  States that she feels like she is herself again.  Is able to focus and complete tasks.  Overall she is happy with the current dose.  Would like to continue on the Vyvanse.    She is overdue for mammogram.  Requesting order.    She is currently followed by gynecology, been having irregular menstrual cycles, endometrial biopsy has been advised.  She states that anytime she has biopsies on her pelvic exam she immediately passed out.  Hoping to get something for pain control.  That particular gynecologist is not providing any pain medications for her for the biopsy.  Requesting if I would be able to prescribe her pain medications dose.  She is also considering seeking second opinion with new gynecologist.    ROS   See above    Current medicines (including changes today)  Current Outpatient Medications   Medication Sig Dispense Refill    diazePAM (VALIUM) 2 MG Tab Take 2 mg by mouth 1 time a day as needed (Cramps).      lisdexamfetamine (VYVANSE) 30 MG capsule Take 1 Capsule by mouth every morning for 30 days. 30 Capsule 0    [START ON 10/15/2023]  "lisdexamfetamine (VYVANSE) 30 MG capsule Take 1 Capsule by mouth every morning for 30 days. 30 Capsule 0    [START ON 11/14/2023] lisdexamfetamine (VYVANSE) 30 MG capsule Take 1 Capsule by mouth every morning for 30 days. 30 Capsule 0    cyanocobalamin (VITAMIN B-12) 1000 MCG/ML Solution INJECT 1ML INTO THE SHOULDER, THIGH OR BUTTOCK EVERY 30 DAYS 1 mL 0    levothyroxine (SYNTHROID) 25 MCG Tab Take 1 Tablet by mouth every morning on an empty stomach. 90 Tablet 0    meloxicam (MOBIC) 7.5 MG Tab Take 1 Tablet by mouth 1 time a day as needed for Moderate Pain. 30 Tablet 2    cyclobenzaprine (FLEXERIL) 10 mg Tab Take 1 Tablet by mouth 3 times a day as needed for Muscle Spasms. 90 Tablet 0    hydroquinone 4 % cream Apply 1 Application topically 1 time a day as needed (rash). 28.35 g 2    Adapalene 0.3 % Gel       oxyCODONE-acetaminophen (PERCOCET) 5-325 MG Tab       B-D 3CC LUER-CUBA SYR 25GX1\" 25G X 1\" 3 ML Misc       Probiotic Product (PROBIOTIC PO) Take 1 Capsule by mouth every day.      Ferrous Sulfate (IRON PO) Take 325 mg by mouth 1 time a day as needed. During menstrual cycle      FIBER PO Take 1 Scoop by mouth every day.      Cholecalciferol (VITAMIN D) 50 MCG (2000 UT) Cap Take 1 Capsule by mouth every day. 100 Capsule 3    Cholecalciferol (VITAMIN D3) 5000 units Tab Take 5,000 Units by mouth every day. 30 Tab      No current facility-administered medications for this visit.       Patient Active Problem List    Diagnosis Date Noted    S/P hip replacement 05/01/2023    Acquired hypothyroidism 10/15/2020    B12 deficiency 10/15/2020    Eyelid disorder 10/15/2020    Anxiety 03/29/2018    Tension headache 03/29/2018    Attention deficit hyperactivity disorder (ADHD), predominantly inattentive type 03/29/2018    Multiple allergies 03/29/2018    Vitamin D deficiency disease 08/15/2016    Migraine with aura and without status migrainosus, not intractable 06/09/2015    Mixed hyperlipidemia 10/07/2013        Objective: " "  Resp 16   Ht 1.549 m (5' 1\")   Wt 74.4 kg (164 lb)   BMI 30.99 kg/m²     Physical Exam:  Constitutional: Alert, no distress, well-groomed.  Skin: No rashes in visible areas.  Eye: Round. Conjunctiva clear, lids normal. No icterus.   ENMT: Lips pink without lesions, good dentition, moist mucous membranes. Phonation normal.  Neck: No masses, no thyromegaly. Moves freely without pain.  Respiratory: Unlabored respiratory effort, no cough or audible wheeze  Psych: Alert and oriented x3, normal affect and mood.     Assessment and Plan:   The following treatment plan was discussed:     1. Attention deficit hyperactivity disorder (ADHD), predominantly inattentive type  -Doing well with 30 mg of Vyvanse.  We will continue on current dose.  PDMP checked.  3 months prescription sent to the pharmacy.  Follow-up with PCP in 3 months for continued management  - lisdexamfetamine (VYVANSE) 30 MG capsule; Take 1 Capsule by mouth every morning for 30 days.  Dispense: 30 Capsule; Refill: 0  - lisdexamfetamine (VYVANSE) 30 MG capsule; Take 1 Capsule by mouth every morning for 30 days.  Dispense: 30 Capsule; Refill: 0  - lisdexamfetamine (VYVANSE) 30 MG capsule; Take 1 Capsule by mouth every morning for 30 days.  Dispense: 30 Capsule; Refill: 0    2. Visit for screening mammogram  -Due for screening  - MA-SCREENING MAMMO BILAT W/TOMOSYNTHESIS W/CAD; Future    3. Irregular menses  Had lengthy discussion with patient that I can prescribe 1 dose of Norco prior to her endometrial biopsy as her gynecologist is deferring prescription to PCP.  However, she is considering second opinion with a new gynecologist.  If she switche to new gynecologist, and they are still not willing to prescribe pain medication prior to endometrial biopsy can reach out to the clinic and I will fill for her.      My total time spent caring for the patient on the day of the encounter was 40 minutes.   This does not include time spent on separately billable " procedures/tests.      Follow-up: Return in about 3 months (around 12/15/2023) for Medication Check, Lab Review w/pcp.

## 2023-09-22 DIAGNOSIS — F90.0 ATTENTION DEFICIT HYPERACTIVITY DISORDER (ADHD), PREDOMINANTLY INATTENTIVE TYPE: ICD-10-CM

## 2023-09-22 RX ORDER — LISDEXAMFETAMINE DIMESYLATE CAPSULES 10 MG/1
20 CAPSULE ORAL DAILY
Qty: 60 CAPSULE | Refills: 0 | Status: SHIPPED | OUTPATIENT
Start: 2023-09-22 | End: 2023-10-22

## 2023-09-22 RX ORDER — LISDEXAMFETAMINE DIMESYLATE CAPSULES 10 MG/1
10 CAPSULE ORAL DAILY
Qty: 30 CAPSULE | Refills: 0 | Status: SHIPPED | OUTPATIENT
Start: 2023-09-22 | End: 2023-10-22

## 2023-09-22 RX ORDER — LISDEXAMFETAMINE DIMESYLATE CAPSULES 10 MG/1
10 CAPSULE ORAL DAILY
Qty: 30 CAPSULE | Refills: 0 | Status: SHIPPED | OUTPATIENT
Start: 2023-11-21 | End: 2023-12-21

## 2023-09-22 RX ORDER — LISDEXAMFETAMINE DIMESYLATE CAPSULES 10 MG/1
10 CAPSULE ORAL DAILY
Qty: 30 CAPSULE | Refills: 0 | Status: SHIPPED | OUTPATIENT
Start: 2023-10-22 | End: 2023-11-21

## 2023-09-25 ENCOUNTER — APPOINTMENT (OUTPATIENT)
Dept: RADIOLOGY | Facility: MEDICAL CENTER | Age: 43
End: 2023-09-25
Attending: PHYSICIAN ASSISTANT
Payer: COMMERCIAL

## 2023-09-25 DIAGNOSIS — Z12.31 VISIT FOR SCREENING MAMMOGRAM: ICD-10-CM

## 2023-09-25 PROCEDURE — 77063 BREAST TOMOSYNTHESIS BI: CPT

## 2023-11-08 DIAGNOSIS — F90.0 ATTENTION DEFICIT HYPERACTIVITY DISORDER (ADHD), PREDOMINANTLY INATTENTIVE TYPE: ICD-10-CM

## 2023-11-08 RX ORDER — LISDEXAMFETAMINE DIMESYLATE CAPSULES 40 MG/1
40 CAPSULE ORAL
Qty: 90 CAPSULE | Refills: 0 | Status: SHIPPED | OUTPATIENT
Start: 2023-11-08 | End: 2023-12-16 | Stop reason: SDUPTHER

## 2023-12-04 ENCOUNTER — TELEMEDICINE (OUTPATIENT)
Dept: MEDICAL GROUP | Age: 43
End: 2023-12-04
Payer: COMMERCIAL

## 2023-12-04 VITALS — HEIGHT: 61 IN | WEIGHT: 166 LBS | BODY MASS INDEX: 31.34 KG/M2

## 2023-12-04 DIAGNOSIS — E66.9 OBESITY (BMI 30-39.9): ICD-10-CM

## 2023-12-04 DIAGNOSIS — F90.0 ATTENTION DEFICIT HYPERACTIVITY DISORDER (ADHD), PREDOMINANTLY INATTENTIVE TYPE: ICD-10-CM

## 2023-12-04 PROCEDURE — 99214 OFFICE O/P EST MOD 30 MIN: CPT | Mod: 95 | Performed by: PHYSICIAN ASSISTANT

## 2023-12-04 RX ORDER — SEMAGLUTIDE 0.68 MG/ML
0.25 INJECTION, SOLUTION SUBCUTANEOUS
Qty: 3 ML | Refills: 0 | Status: SHIPPED | OUTPATIENT
Start: 2023-12-04

## 2023-12-04 ASSESSMENT — FIBROSIS 4 INDEX: FIB4 SCORE: .656

## 2023-12-04 NOTE — PROGRESS NOTES
Virtual Visit: Established Patient   This visit was conducted via Zoom using secure and encrypted videoconferencing technology.   The patient was in their home in the [facility].    The patient's identity was confirmed and verbal consent was obtained for this virtual visit.     Subjective:   CC:   Chief Complaint   Patient presents with    Medication Refill       Sara Taylor is a 43 y.o. female presenting for medication review.  Last time we chatted she was taking 30 mg of Vyvanse.  Initially had been tolerating well, however felt like it was starting to wear off, she did message her PCP and they increased it to 40 mg.  At this dose she does feel stable, she is hesitant to go higher, although feels like there might be some room for improvement.  For now we will continue on 40 mg of Vyvanse.    She also has concerns of difficulty losing weight.  She has significantly changed her diet, is doing intermittent fasting also in conjunction with the Mediterranean diet.  She is going to the gym for an hour 2 days a week, and then walking almost daily in addition to that.  She has not lost any weight.  She is becoming frustrated with this.  Would like to discuss starting Ozempic.    She does have fasting labs ordered.  Plans to get these completed in the next week.    ROS   See above    Current medicines (including changes today)  Current Outpatient Medications   Medication Sig Dispense Refill    Semaglutide,0.25 or 0.5MG/DOS, (OZEMPIC, 0.25 OR 0.5 MG/DOSE,) 2 MG/3ML Solution Pen-injector Inject 0.25 mg under the skin every 7 days. 3 mL 0    Lisdexamfetamine Dimesylate (VYVANSE) 40 MG Cap Take 1 Capsule by mouth every day for 90 days. 90 Capsule 0    diazePAM (VALIUM) 2 MG Tab TAKE ONE TABLET BY MOUTH AT BEDTIME AS NEEDED FOR ANXIETY DURING MENSTRUAL CYCLE 90 Tablet 0    Lisdexamfetamine Dimesylate 10 MG Cap Take 10 mg by mouth every day for 30 days. 30 Capsule 0    lisdexamfetamine (VYVANSE) 30 MG capsule Take 1  "Capsule by mouth every morning for 30 days. 30 Capsule 0    cyanocobalamin (VITAMIN B-12) 1000 MCG/ML Solution INJECT 1ML INTO THE SHOULDER, THIGH OR BUTTOCK EVERY 30 DAYS 1 mL 0    meloxicam (MOBIC) 7.5 MG Tab Take 1 Tablet by mouth 1 time a day as needed for Moderate Pain. 30 Tablet 2    cyclobenzaprine (FLEXERIL) 10 mg Tab Take 1 Tablet by mouth 3 times a day as needed for Muscle Spasms. 90 Tablet 0    hydroquinone 4 % cream Apply 1 Application topically 1 time a day as needed (rash). 28.35 g 2    Adapalene 0.3 % Gel       oxyCODONE-acetaminophen (PERCOCET) 5-325 MG Tab       B-D 3CC LUER-CUBA SYR 25GX1\" 25G X 1\" 3 ML Misc       Probiotic Product (PROBIOTIC PO) Take 1 Capsule by mouth every day.      Ferrous Sulfate (IRON PO) Take 325 mg by mouth 1 time a day as needed. During menstrual cycle      FIBER PO Take 1 Scoop by mouth every day.      Cholecalciferol (VITAMIN D3) 5000 units Tab Take 5,000 Units by mouth every day. 30 Tab     levothyroxine (SYNTHROID) 25 MCG Tab Take 1 Tablet by mouth every morning on an empty stomach. 90 Tablet 0     No current facility-administered medications for this visit.       Patient Active Problem List    Diagnosis Date Noted    Obesity (BMI 30-39.9) 12/04/2023    S/P hip replacement 05/01/2023    Acquired hypothyroidism 10/15/2020    B12 deficiency 10/15/2020    Eyelid disorder 10/15/2020    Anxiety 03/29/2018    Tension headache 03/29/2018    Attention deficit hyperactivity disorder (ADHD), predominantly inattentive type 03/29/2018    Multiple allergies 03/29/2018    Vitamin D deficiency disease 08/15/2016    Migraine with aura and without status migrainosus, not intractable 06/09/2015    Mixed hyperlipidemia 10/07/2013        Objective:   Ht 1.549 m (5' 1\")   Wt 75.3 kg (166 lb)   BMI 31.37 kg/m²     Physical Exam:  Constitutional: Alert, no distress, well-groomed.  Skin: No rashes in visible areas.  Eye: Round. Conjunctiva clear, lids normal. No icterus.   ENMT: Lips pink " without lesions, good dentition, moist mucous membranes. Phonation normal.  Neck: No masses, no thyromegaly. Moves freely without pain.  Respiratory: Unlabored respiratory effort, no cough or audible wheeze  Psych: Alert and oriented x3, normal affect and mood.     Assessment and Plan:   The following treatment plan was discussed:     1. Attention deficit hyperactivity disorder (ADHD), predominantly inattentive type  Stable.  Continue 40 mg of Vyvanse.  90-day supply was sent in by her PCP.  Follow-up with PCP in 3 months for continued management.    2. Obesity (BMI 30-39.9)  Has having difficulties with weight loss, despite changing diet and increasing physical activity.  Will start on 0.25 mg of Ozempic.  Medication side effects reviewed.  Follow-up in 4 weeks for weight check.  Advised to have fasting labs completed.  - Patient identified as having weight management issue.  Appropriate orders and counseling given.  - Semaglutide,0.25 or 0.5MG/DOS, (OZEMPIC, 0.25 OR 0.5 MG/DOSE,) 2 MG/3ML Solution Pen-injector; Inject 0.25 mg under the skin every 7 days.  Dispense: 3 mL; Refill: 0      Follow-up: Return in about 4 weeks (around 1/1/2024) for weight check.

## 2023-12-16 DIAGNOSIS — F90.0 ATTENTION DEFICIT HYPERACTIVITY DISORDER (ADHD), PREDOMINANTLY INATTENTIVE TYPE: ICD-10-CM

## 2023-12-17 NOTE — TELEPHONE ENCOUNTER
Received request via: Pharmacy    Was the patient seen in the last year in this department? Yes    Does the patient have an active prescription (recently filled or refills available) for medication(s) requested? yes    Does the patient have [facility] and need 100 day supply (blood pressure, diabetes and cholesterol meds only)? Patient does not have SCP    Requested Prescriptions     Pending Prescriptions Disp Refills    Lisdexamfetamine Dimesylate (VYVANSE) 40 MG Cap 90 Capsule 0     Sig: Take 1 Capsule by mouth every day for 90 days.

## 2023-12-18 ENCOUNTER — HOSPITAL ENCOUNTER (OUTPATIENT)
Dept: LAB | Facility: MEDICAL CENTER | Age: 43
End: 2023-12-18
Attending: PHYSICIAN ASSISTANT
Payer: COMMERCIAL

## 2023-12-18 DIAGNOSIS — E78.2 MIXED HYPERLIPIDEMIA: ICD-10-CM

## 2023-12-18 DIAGNOSIS — E55.9 VITAMIN D DEFICIENCY: ICD-10-CM

## 2023-12-18 DIAGNOSIS — E53.8 VITAMIN B 12 DEFICIENCY: ICD-10-CM

## 2023-12-18 DIAGNOSIS — Z00.00 BLOOD TESTS FOR ROUTINE GENERAL PHYSICAL EXAMINATION: ICD-10-CM

## 2023-12-18 LAB
25(OH)D3 SERPL-MCNC: 17 NG/ML (ref 30–100)
ALBUMIN SERPL BCP-MCNC: 4.2 G/DL (ref 3.2–4.9)
ALBUMIN/GLOB SERPL: 1.4 G/DL
ALP SERPL-CCNC: 74 U/L (ref 30–99)
ALT SERPL-CCNC: 22 U/L (ref 2–50)
ANION GAP SERPL CALC-SCNC: 10 MMOL/L (ref 7–16)
AST SERPL-CCNC: 17 U/L (ref 12–45)
BASOPHILS # BLD AUTO: 0.6 % (ref 0–1.8)
BASOPHILS # BLD: 0.04 K/UL (ref 0–0.12)
BILIRUB SERPL-MCNC: 0.4 MG/DL (ref 0.1–1.5)
BUN SERPL-MCNC: 10 MG/DL (ref 8–22)
CALCIUM ALBUM COR SERPL-MCNC: 8.8 MG/DL (ref 8.5–10.5)
CALCIUM SERPL-MCNC: 9 MG/DL (ref 8.5–10.5)
CHLORIDE SERPL-SCNC: 104 MMOL/L (ref 96–112)
CHOLEST SERPL-MCNC: 238 MG/DL (ref 100–199)
CO2 SERPL-SCNC: 24 MMOL/L (ref 20–33)
CREAT SERPL-MCNC: 0.76 MG/DL (ref 0.5–1.4)
EOSINOPHIL # BLD AUTO: 0.15 K/UL (ref 0–0.51)
EOSINOPHIL NFR BLD: 2.4 % (ref 0–6.9)
ERYTHROCYTE [DISTWIDTH] IN BLOOD BY AUTOMATED COUNT: 40.5 FL (ref 35.9–50)
FASTING STATUS PATIENT QL REPORTED: NORMAL
GFR SERPLBLD CREATININE-BSD FMLA CKD-EPI: 101 ML/MIN/1.73 M 2
GLOBULIN SER CALC-MCNC: 3 G/DL (ref 1.9–3.5)
GLUCOSE SERPL-MCNC: 94 MG/DL (ref 65–99)
HCT VFR BLD AUTO: 44.3 % (ref 37–47)
HDLC SERPL-MCNC: 61 MG/DL
HGB BLD-MCNC: 15.3 G/DL (ref 12–16)
IMM GRANULOCYTES # BLD AUTO: 0.01 K/UL (ref 0–0.11)
IMM GRANULOCYTES NFR BLD AUTO: 0.2 % (ref 0–0.9)
LDLC SERPL CALC-MCNC: 163 MG/DL
LYMPHOCYTES # BLD AUTO: 2.75 K/UL (ref 1–4.8)
LYMPHOCYTES NFR BLD: 43.4 % (ref 22–41)
MCH RBC QN AUTO: 31.3 PG (ref 27–33)
MCHC RBC AUTO-ENTMCNC: 34.5 G/DL (ref 32.2–35.5)
MCV RBC AUTO: 90.6 FL (ref 81.4–97.8)
MONOCYTES # BLD AUTO: 0.41 K/UL (ref 0–0.85)
MONOCYTES NFR BLD AUTO: 6.5 % (ref 0–13.4)
NEUTROPHILS # BLD AUTO: 2.97 K/UL (ref 1.82–7.42)
NEUTROPHILS NFR BLD: 46.9 % (ref 44–72)
NRBC # BLD AUTO: 0 K/UL
NRBC BLD-RTO: 0 /100 WBC (ref 0–0.2)
PLATELET # BLD AUTO: 176 K/UL (ref 164–446)
PMV BLD AUTO: 10.6 FL (ref 9–12.9)
POTASSIUM SERPL-SCNC: 4.1 MMOL/L (ref 3.6–5.5)
PROT SERPL-MCNC: 7.2 G/DL (ref 6–8.2)
RBC # BLD AUTO: 4.89 M/UL (ref 4.2–5.4)
SODIUM SERPL-SCNC: 138 MMOL/L (ref 135–145)
TRIGL SERPL-MCNC: 71 MG/DL (ref 0–149)
TSH SERPL DL<=0.005 MIU/L-ACNC: 3.98 UIU/ML (ref 0.38–5.33)
VIT B12 SERPL-MCNC: 808 PG/ML (ref 211–911)
WBC # BLD AUTO: 6.3 K/UL (ref 4.8–10.8)

## 2023-12-18 PROCEDURE — 82607 VITAMIN B-12: CPT

## 2023-12-18 PROCEDURE — 80053 COMPREHEN METABOLIC PANEL: CPT

## 2023-12-18 PROCEDURE — 82306 VITAMIN D 25 HYDROXY: CPT

## 2023-12-18 PROCEDURE — 80061 LIPID PANEL: CPT

## 2023-12-18 PROCEDURE — 85025 COMPLETE CBC W/AUTO DIFF WBC: CPT

## 2023-12-18 PROCEDURE — 84443 ASSAY THYROID STIM HORMONE: CPT

## 2023-12-18 PROCEDURE — 36415 COLL VENOUS BLD VENIPUNCTURE: CPT

## 2023-12-18 RX ORDER — LISDEXAMFETAMINE DIMESYLATE CAPSULES 40 MG/1
40 CAPSULE ORAL
Qty: 90 CAPSULE | Refills: 0 | Status: SHIPPED | OUTPATIENT
Start: 2023-12-18 | End: 2024-03-17

## 2024-01-12 DIAGNOSIS — Z96.649 STATUS POST HIP REPLACEMENT, UNSPECIFIED LATERALITY: ICD-10-CM

## 2024-01-12 RX ORDER — MELOXICAM 7.5 MG/1
TABLET ORAL
Qty: 90 TABLET | Refills: 0 | Status: SHIPPED | OUTPATIENT
Start: 2024-01-12

## 2024-01-23 DIAGNOSIS — F90.0 ATTENTION DEFICIT HYPERACTIVITY DISORDER (ADHD), PREDOMINANTLY INATTENTIVE TYPE: ICD-10-CM

## 2024-01-23 RX ORDER — LISDEXAMFETAMINE DIMESYLATE CAPSULES 10 MG/1
10 CAPSULE ORAL DAILY
Qty: 90 CAPSULE | Refills: 0 | Status: SHIPPED | OUTPATIENT
Start: 2024-01-23 | End: 2024-04-22

## 2024-01-23 RX ORDER — LISDEXAMFETAMINE DIMESYLATE CAPSULES 30 MG/1
30 CAPSULE ORAL EVERY MORNING
Qty: 90 CAPSULE | Refills: 0 | Status: SHIPPED | OUTPATIENT
Start: 2024-01-23 | End: 2024-04-22

## 2024-02-20 DIAGNOSIS — F90.0 ATTENTION DEFICIT HYPERACTIVITY DISORDER (ADHD), PREDOMINANTLY INATTENTIVE TYPE: ICD-10-CM

## 2024-02-20 RX ORDER — DEXTROAMPHETAMINE SACCHARATE, AMPHETAMINE ASPARTATE MONOHYDRATE, DEXTROAMPHETAMINE SULFATE AND AMPHETAMINE SULFATE 5; 5; 5; 5 MG/1; MG/1; MG/1; MG/1
40 CAPSULE, EXTENDED RELEASE ORAL EVERY MORNING
Qty: 60 CAPSULE | Refills: 0 | Status: SHIPPED | OUTPATIENT
Start: 2024-02-20 | End: 2024-03-27

## 2024-02-20 NOTE — TELEPHONE ENCOUNTER
Patient message my chart :    Hernanas disregard my last message. [facility] pharmacy has no vyvanse so I would like to switch back to 20xr adderall generic or brand name, and 10ml instant release adderall generic or brand name for this month. I take the 20xr on the am and the 10ml when needed. Please send [facility] pharmacy the scripts. I appreciate your communication and also would like you to write in the scripts that the reason for the changes is because of  the availablility of these types of medications.

## 2024-03-11 DIAGNOSIS — E53.8 B12 DEFICIENCY: ICD-10-CM

## 2024-03-11 RX ORDER — CYANOCOBALAMIN 1000 UG/ML
INJECTION, SOLUTION INTRAMUSCULAR; SUBCUTANEOUS
Qty: 10 ML | Refills: 0 | Status: SHIPPED | OUTPATIENT
Start: 2024-03-11

## 2024-03-18 ENCOUNTER — TELEPHONE (OUTPATIENT)
Dept: MEDICAL GROUP | Age: 44
End: 2024-03-18
Payer: COMMERCIAL

## 2024-03-18 DIAGNOSIS — F90.0 ATTENTION DEFICIT HYPERACTIVITY DISORDER (ADHD), PREDOMINANTLY INATTENTIVE TYPE: ICD-10-CM

## 2024-03-18 RX ORDER — DEXTROAMPHETAMINE SACCHARATE, AMPHETAMINE ASPARTATE MONOHYDRATE, DEXTROAMPHETAMINE SULFATE AND AMPHETAMINE SULFATE 5; 5; 5; 5 MG/1; MG/1; MG/1; MG/1
40 CAPSULE, EXTENDED RELEASE ORAL EVERY MORNING
Qty: 60 CAPSULE | Refills: 0 | Status: CANCELLED | OUTPATIENT
Start: 2024-03-18 | End: 2024-04-17

## 2024-03-18 NOTE — TELEPHONE ENCOUNTER
Patient my chart message please advise :    Hello there, I called [facility] and they have the adderall available. Can you please send over the same as last month? I have been taking 2-20ml.xr adderall every morning. Also, will you send over refills or three separate prescriptions of the adderall to cover the next three months? Thank you!!!

## 2024-03-27 DIAGNOSIS — F90.0 ATTENTION DEFICIT HYPERACTIVITY DISORDER (ADHD), PREDOMINANTLY INATTENTIVE TYPE: ICD-10-CM

## 2024-03-27 RX ORDER — DEXTROAMPHETAMINE SACCHARATE, AMPHETAMINE ASPARTATE MONOHYDRATE, DEXTROAMPHETAMINE SULFATE AND AMPHETAMINE SULFATE 5; 5; 5; 5 MG/1; MG/1; MG/1; MG/1
CAPSULE, EXTENDED RELEASE ORAL
Qty: 60 CAPSULE | Refills: 0 | Status: SHIPPED | OUTPATIENT
Start: 2024-03-27 | End: 2024-04-26

## 2024-03-28 ENCOUNTER — APPOINTMENT (OUTPATIENT)
Dept: MEDICAL GROUP | Age: 44
End: 2024-03-28
Payer: COMMERCIAL

## 2024-04-10 DIAGNOSIS — Z96.649 STATUS POST HIP REPLACEMENT, UNSPECIFIED LATERALITY: ICD-10-CM

## 2024-04-11 RX ORDER — MELOXICAM 7.5 MG/1
TABLET ORAL
Qty: 90 TABLET | Refills: 0 | Status: SHIPPED | OUTPATIENT
Start: 2024-04-11

## 2024-04-26 ENCOUNTER — OFFICE VISIT (OUTPATIENT)
Dept: MEDICAL GROUP | Age: 44
End: 2024-04-26
Payer: COMMERCIAL

## 2024-04-26 ENCOUNTER — HOSPITAL ENCOUNTER (OUTPATIENT)
Facility: MEDICAL CENTER | Age: 44
End: 2024-04-26
Attending: PHYSICIAN ASSISTANT
Payer: COMMERCIAL

## 2024-04-26 VITALS
DIASTOLIC BLOOD PRESSURE: 76 MMHG | TEMPERATURE: 97 F | SYSTOLIC BLOOD PRESSURE: 120 MMHG | RESPIRATION RATE: 16 BRPM | HEIGHT: 61 IN | BODY MASS INDEX: 28.7 KG/M2 | HEART RATE: 92 BPM | WEIGHT: 152 LBS | OXYGEN SATURATION: 98 %

## 2024-04-26 DIAGNOSIS — R30.0 DYSURIA: ICD-10-CM

## 2024-04-26 DIAGNOSIS — N30.01 ACUTE CYSTITIS WITH HEMATURIA: ICD-10-CM

## 2024-04-26 DIAGNOSIS — F90.0 ATTENTION DEFICIT HYPERACTIVITY DISORDER (ADHD), PREDOMINANTLY INATTENTIVE TYPE: ICD-10-CM

## 2024-04-26 LAB
APPEARANCE UR: ABNORMAL
BILIRUB UR STRIP-MCNC: NEGATIVE MG/DL
COLOR UR AUTO: ABNORMAL
GLUCOSE UR STRIP.AUTO-MCNC: 100 MG/DL
KETONES UR STRIP.AUTO-MCNC: NEGATIVE MG/DL
LEUKOCYTE ESTERASE UR QL STRIP.AUTO: NEGATIVE
NITRITE UR QL STRIP.AUTO: POSITIVE
PH UR STRIP.AUTO: 7 [PH] (ref 5–8)
POCT INT CON NEG: NEGATIVE
POCT INT CON POS: POSITIVE
POCT URINE PREGNANCY TEST: NEGATIVE
PROT UR QL STRIP: ABNORMAL MG/DL
RBC UR QL AUTO: NEGATIVE
SP GR UR STRIP.AUTO: 1.01
UROBILINOGEN UR STRIP-MCNC: 1 MG/DL

## 2024-04-26 PROCEDURE — 87186 SC STD MICRODIL/AGAR DIL: CPT

## 2024-04-26 PROCEDURE — 81025 URINE PREGNANCY TEST: CPT | Performed by: PHYSICIAN ASSISTANT

## 2024-04-26 PROCEDURE — 3078F DIAST BP <80 MM HG: CPT | Performed by: PHYSICIAN ASSISTANT

## 2024-04-26 PROCEDURE — 87077 CULTURE AEROBIC IDENTIFY: CPT

## 2024-04-26 PROCEDURE — 99215 OFFICE O/P EST HI 40 MIN: CPT | Performed by: PHYSICIAN ASSISTANT

## 2024-04-26 PROCEDURE — 87086 URINE CULTURE/COLONY COUNT: CPT

## 2024-04-26 PROCEDURE — 3074F SYST BP LT 130 MM HG: CPT | Performed by: PHYSICIAN ASSISTANT

## 2024-04-26 PROCEDURE — 81002 URINALYSIS NONAUTO W/O SCOPE: CPT | Performed by: PHYSICIAN ASSISTANT

## 2024-04-26 RX ORDER — AMOXICILLIN 500 MG/1
CAPSULE ORAL
COMMUNITY
Start: 2024-02-19 | End: 2024-04-26

## 2024-04-26 RX ORDER — SULFAMETHOXAZOLE AND TRIMETHOPRIM 800; 160 MG/1; MG/1
1 TABLET ORAL 2 TIMES DAILY
Qty: 10 TABLET | Refills: 0 | Status: SHIPPED | OUTPATIENT
Start: 2024-04-26 | End: 2024-05-01

## 2024-04-26 RX ORDER — SULFAMETHOXAZOLE AND TRIMETHOPRIM 800; 160 MG/1; MG/1
1 TABLET ORAL 2 TIMES DAILY
Qty: 6 TABLET | Refills: 0 | Status: SHIPPED | OUTPATIENT
Start: 2024-04-26 | End: 2024-04-26 | Stop reason: SDUPTHER

## 2024-04-26 ASSESSMENT — PATIENT HEALTH QUESTIONNAIRE - PHQ9: CLINICAL INTERPRETATION OF PHQ2 SCORE: 0

## 2024-04-26 ASSESSMENT — FIBROSIS 4 INDEX: FIB4 SCORE: 0.84

## 2024-04-27 DIAGNOSIS — F90.0 ATTENTION DEFICIT HYPERACTIVITY DISORDER (ADHD), PREDOMINANTLY INATTENTIVE TYPE: ICD-10-CM

## 2024-04-27 NOTE — PROGRESS NOTES
"cc: Dysuria, urinary frequency.  Discussed ADHD medications    Subjective:     HPI  Sara Taylor is a 43 y.o. female presenting with acute concerns of urinary frequency, urgency dysuria initially started a week ago.  She did have prescription for amoxicillin and took 4-500 mg tablets 6 days ago, initially provided some relief, however has continued to have dysuria, although improving in the past 2 days.  She is taking over-the-counter Azo, increasing hydration, cranberry juice.  Has felt slightly chilled with hot flashes.  No reported temperature, slightly nauseated.  Mild aching in the low back.  Denies vomiting, fevers.    She her previous PCP has been unavailable this past year, so I have seen her multiple visits regarding her ADHD.  She like to transfer care.  She was previously on 30 mg of Vyvanse, was providing some good benefit for her, however was having difficulties getting this filled through the pharmacy due to lack of availability.  Thus her PCP sent in 20 mg of Adderall extended release, as she been on this in the past.  She has been going through particularly stressful month, she is unsure if this is providing added benefit, it is \"better than nothing\".  She also mentions that in addition to 20 mg of Adderall she has leftover 10 mg tablets and takes that at the same time, other days she will take an additional 5 mg in the afternoon.  She is noting some hard crashes.  She has been on Wellbutrin in the past, was on this for about 2 years-made her feel groggy.        Review of systems:  See above.       Current Outpatient Medications:     sulfamethoxazole-trimethoprim (BACTRIM DS) 800-160 MG tablet, Take 1 Tablet by mouth 2 times a day for 5 days., Disp: 10 Tablet, Rfl: 0    meloxicam (MOBIC) 7.5 MG Tab, TAKE ONE TABLET BY MOUTH DAILY AS NEEDED FOR MODERATE PAIN, Disp: 90 Tablet, Rfl: 0    amphetamine-dextroamphetamine (ADDERALL XR) 20 MG per XR capsule, TAKE TWO CAPSULES BY MOUTH IN THE MORNING FOR " "30 DAYS, Disp: 60 Capsule, Rfl: 0    cyanocobalamin (VITAMIN B-12) 1000 MCG/ML Solution, INJECT 1ML INTO THE SHOULDER, THIGH, OR BUTTOCKS EVERY 30 DAYS FOR 30 DAYS., Disp: 10 mL, Rfl: 0    levothyroxine (SYNTHROID) 25 MCG Tab, Take 1 Tablet by mouth every morning on an empty stomach., Disp: 90 Tablet, Rfl: 0    cyclobenzaprine (FLEXERIL) 10 mg Tab, Take 1 Tablet by mouth 3 times a day as needed for Muscle Spasms., Disp: 90 Tablet, Rfl: 0    hydroquinone 4 % cream, Apply 1 Application topically 1 time a day as needed (rash)., Disp: 28.35 g, Rfl: 2    Adapalene 0.3 % Gel, , Disp: , Rfl:     oxyCODONE-acetaminophen (PERCOCET) 5-325 MG Tab, , Disp: , Rfl:     Probiotic Product (PROBIOTIC PO), Take 1 Capsule by mouth every day., Disp: , Rfl:     Ferrous Sulfate (IRON PO), Take 325 mg by mouth 1 time a day as needed. During menstrual cycle, Disp: , Rfl:     FIBER PO, Take 1 Scoop by mouth every day., Disp: , Rfl:     Cholecalciferol (VITAMIN D3) 5000 units Tab, Take 5,000 Units by mouth every day., Disp: 30 Tab, Rfl:     Allergies, past medical history, past surgical history, family history, social history reviewed and updated    Objective:     Vitals: /76 (BP Location: Left arm, Patient Position: Sitting, BP Cuff Size: Adult)   Pulse 92   Temp 36.1 °C (97 °F) (Temporal)   Resp 16   Ht 1.549 m (5' 1\")   Wt 68.9 kg (152 lb)   SpO2 98%   BMI 28.72 kg/m²   General: Alert, pleasant, NAD  HEENT: Normocephalic. Neck supple. No carotid bruits   Heart: Regular rate and rhythm.  S1 and S2 normal.  No murmurs appreciated.  Respiratory: Normal respiratory effort.  Clear to auscultation bilaterally.  Abdomen:  Normoactive bowel sounds.  Non-distended, soft, non-tender, no guarding/rebound. No hepatosplenomegaly.  No suprapubic tenderness  Back: No CVAT  Skin: Warm, dry, no rashes.  Psych:  Affect/mood is normal, judgement is good, memory is intact, grooming is appropriate.    Assessment/Plan:     Sara was seen today " for uti.    Diagnoses and all orders for this visit:    Acute cystitis with hematuria  -Symptoms have been ongoing for almost a week, is noting chills and hot flashes.  She is afebrile in clinic today.  No CVAT, however UA is positive for UTI.  Will cover for kidney infection with Bactrim as well.  Send urine for culture.  Will adjust medication if needed pending results.  Continue with good hydration.  Advised if she starts to spike a fever, symptoms worsen she needs to be reevaluated immediately in the ER.  -     sulfamethoxazole-trimethoprim (BACTRIM DS) 800-160 MG tablet; Take 1 Tablet by mouth 2 times a day for 5 days.    Dysuria  -     POCT Urinalysis  -     POCT PREGNANCY  -     Urine Culture; Future    Attention deficit hyperactivity disorder (ADHD), predominantly inattentive type  Had lengthy discussion with patient about medication management.  She been tried on multiple medications, she is frustrated with having to switch from different medications due to lack of availability due to shortages.  Is looking for something on a more consistent basis, where she is not having these crashes.  Did suggest Strattera, she will call her insurance to see if this is even covered.  Also advised to keep a log of her symptoms as far as when she takes to 20 mg of the extended release Adderall how she feels with this, advised to not take the 10 mg short release Adderall when she takes the 20 mg initially the morning.  She can take half a tablet of the short release 10 mg in the afternoon if needed.  Will follow-up in 3 weeks to review symptom log.      My total time spent caring for the patient on the day of the encounter was 40 minutes.   This does not include time spent on separately billable procedures/tests.      Return in about 3 weeks (around 5/17/2024) for Medication Check.

## 2024-04-29 LAB
BACTERIA UR CULT: ABNORMAL
BACTERIA UR CULT: ABNORMAL
SIGNIFICANT IND 70042: ABNORMAL
SITE SITE: ABNORMAL
SOURCE SOURCE: ABNORMAL

## 2024-04-29 RX ORDER — LISDEXAMFETAMINE DIMESYLATE 30 MG/1
30 CAPSULE ORAL EVERY MORNING
Qty: 90 CAPSULE | Refills: 0 | Status: SHIPPED | OUTPATIENT
Start: 2024-04-29 | End: 2024-07-28

## 2024-05-20 ENCOUNTER — OFFICE VISIT (OUTPATIENT)
Dept: MEDICAL GROUP | Age: 44
End: 2024-05-20
Payer: COMMERCIAL

## 2024-05-20 VITALS
HEART RATE: 80 BPM | SYSTOLIC BLOOD PRESSURE: 118 MMHG | OXYGEN SATURATION: 98 % | RESPIRATION RATE: 16 BRPM | WEIGHT: 152 LBS | HEIGHT: 61 IN | TEMPERATURE: 97 F | BODY MASS INDEX: 28.7 KG/M2 | DIASTOLIC BLOOD PRESSURE: 70 MMHG

## 2024-05-20 DIAGNOSIS — E78.2 MIXED HYPERLIPIDEMIA: ICD-10-CM

## 2024-05-20 DIAGNOSIS — F90.0 ATTENTION DEFICIT HYPERACTIVITY DISORDER (ADHD), PREDOMINANTLY INATTENTIVE TYPE: ICD-10-CM

## 2024-05-20 DIAGNOSIS — E03.9 ACQUIRED HYPOTHYROIDISM: ICD-10-CM

## 2024-05-20 DIAGNOSIS — Z00.00 BLOOD TESTS FOR ROUTINE GENERAL PHYSICAL EXAMINATION: ICD-10-CM

## 2024-05-20 DIAGNOSIS — E55.9 VITAMIN D DEFICIENCY: ICD-10-CM

## 2024-05-20 DIAGNOSIS — E53.8 B12 DEFICIENCY: ICD-10-CM

## 2024-05-20 PROCEDURE — 3078F DIAST BP <80 MM HG: CPT | Performed by: PHYSICIAN ASSISTANT

## 2024-05-20 PROCEDURE — 99214 OFFICE O/P EST MOD 30 MIN: CPT | Performed by: PHYSICIAN ASSISTANT

## 2024-05-20 PROCEDURE — 3074F SYST BP LT 130 MM HG: CPT | Performed by: PHYSICIAN ASSISTANT

## 2024-05-20 RX ORDER — DEXTROAMPHETAMINE SACCHARATE, AMPHETAMINE ASPARTATE, DEXTROAMPHETAMINE SULFATE AND AMPHETAMINE SULFATE 2.5; 2.5; 2.5; 2.5 MG/1; MG/1; MG/1; MG/1
10 TABLET ORAL 2 TIMES DAILY
Qty: 60 TABLET | Refills: 0 | Status: SHIPPED | OUTPATIENT
Start: 2024-05-20 | End: 2024-06-19

## 2024-05-20 ASSESSMENT — FIBROSIS 4 INDEX: FIB4 SCORE: 0.84

## 2024-05-20 NOTE — PROGRESS NOTES
cc: Medication review    Subjective:     HPI    Sara Taylor is a 43 y.o. female presenting for medication review.  She is in having a difficult time getting her Vyvanse filled.  She does like the way that she feels on the 30 mg of Vyvanse, does help control her symptoms.  However she is becoming more more frustrated with her inability to get the medication, then she is off of it and that creates instability for her, which she is finding understandably frustrating.  She would like to discuss alternatives.  She has been on Strattera in the past, did not tolerate this at all.  She is also been on Wellbutrin, did not find this beneficial.  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    She also has intermittent low back muscle spasms on also upper cervical muscle spasms, causing tension headaches.  Does have prescription for Flexeril, takes total of 5 mg as needed.    She also intermittently uses Valium for her anxiety, takes maybe once a week if that.  Does not need refill today      Review of systems:  See above.       Current Outpatient Medications:     amphetamine-dextroamphetamine (ADDERALL) 10 MG Tab, Take 1 Tablet by mouth 2 times a day for 30 days., Disp: 60 Tablet, Rfl: 0    meloxicam (MOBIC) 7.5 MG Tab, TAKE ONE TABLET BY MOUTH DAILY AS NEEDED FOR MODERATE PAIN, Disp: 90 Tablet, Rfl: 0    cyanocobalamin (VITAMIN B-12) 1000 MCG/ML Solution, INJECT 1ML INTO THE SHOULDER, THIGH, OR BUTTOCKS EVERY 30 DAYS FOR 30 DAYS., Disp: 10 mL, Rfl: 0    levothyroxine (SYNTHROID) 25 MCG Tab, Take 1 Tablet by mouth every morning on an empty stomach., Disp: 90 Tablet, Rfl: 0    cyclobenzaprine (FLEXERIL) 10 mg Tab, Take 1 Tablet by mouth 3 times a day as needed for Muscle Spasms., Disp: 90 Tablet, Rfl: 0     "hydroquinone 4 % cream, Apply 1 Application topically 1 time a day as needed (rash)., Disp: 28.35 g, Rfl: 2    Adapalene 0.3 % Gel, , Disp: , Rfl:     oxyCODONE-acetaminophen (PERCOCET) 5-325 MG Tab, , Disp: , Rfl:     Probiotic Product (PROBIOTIC PO), Take 1 Capsule by mouth every day., Disp: , Rfl:     Ferrous Sulfate (IRON PO), Take 325 mg by mouth 1 time a day as needed. During menstrual cycle, Disp: , Rfl:     FIBER PO, Take 1 Scoop by mouth every day., Disp: , Rfl:     Cholecalciferol (VITAMIN D3) 5000 units Tab, Take 5,000 Units by mouth every day., Disp: 30 Tab, Rfl:     Allergies, past medical history, past surgical history, family history, social history reviewed and updated    Objective:     Vitals: /70 (BP Location: Left arm, Patient Position: Sitting, BP Cuff Size: Adult)   Pulse 80   Temp 36.1 °C (97 °F) (Temporal)   Resp 16   Ht 1.549 m (5' 1\")   Wt 68.9 kg (152 lb)   SpO2 98%   BMI 28.72 kg/m²   General: Alert, pleasant, NAD  HEENT: Normocephalic. Neck supple No carotid bruits   Heart: Regular rate and rhythm.  S1 and S2 normal.  No murmurs appreciated.  Respiratory: Normal respiratory effort.  Clear to auscultation bilaterally.  Skin: Warm, dry, no rashes.  Psych:  Affect/mood is normal, judgement is good, memory is intact, grooming is appropriate.    Assessment/Plan:     Sara was seen today for follow-up.    Diagnoses and all orders for this visit:    Attention deficit hyperactivity disorder (ADHD), predominantly inattentive type  -Did tolerate Vyvanse well and it was effective, however due to shortages and instability of being consistently on the medication, she would prefer to try something else.  Understanding of this.  Will start off on 10 mg of Adderall twice daily.  Discussed that she can use 5 mg in the afternoon if needed, if 10 mg not effective she can increase up to a total of 20 twice daily.  -     amphetamine-dextroamphetamine (ADDERALL) 10 MG Tab; Take 1 Tablet by mouth " 2 times a day for 30 days.    Acquired hypothyroidism  -Due for labs.  Will check thyroid level.  Further treatment as needed pending results  -     TSH WITH REFLEX TO FT4; Future    Mixed hyperlipidemia  -Previously moderately elevated.  Will check lipid panel.  Further treatment as needed pending results  -     Lipid Profile; Future    Vitamin D deficiency  -Overdue for labs.  Will check vitamin D level.  Further treatment as needed pending results  -     VITAMIN D,25 HYDROXY (DEFICIENCY); Future    B12 deficiency  -Prior history, was prescribed vitamin B12 injections by previous PCP.  Will check vitamin B12 level.  Further treatment as needed pending results  -     VITAMIN B12; Future    Blood tests for routine general physical examination  -     TSH WITH REFLEX TO FT4; Future  -     Lipid Profile; Future  -     Comp Metabolic Panel; Future  -     CBC WITH DIFFERENTIAL; Future        Return in about 4 weeks (around 6/17/2024) for Medication Check, Lab Review.

## 2024-05-24 ENCOUNTER — APPOINTMENT (OUTPATIENT)
Dept: MEDICAL GROUP | Age: 44
End: 2024-05-24
Payer: COMMERCIAL

## 2024-06-17 ENCOUNTER — APPOINTMENT (OUTPATIENT)
Dept: LAB | Facility: MEDICAL CENTER | Age: 44
End: 2024-06-17
Attending: PHYSICIAN ASSISTANT
Payer: COMMERCIAL

## 2024-06-17 DIAGNOSIS — E03.9 ACQUIRED HYPOTHYROIDISM: ICD-10-CM

## 2024-06-17 DIAGNOSIS — Z00.00 BLOOD TESTS FOR ROUTINE GENERAL PHYSICAL EXAMINATION: ICD-10-CM

## 2024-06-17 DIAGNOSIS — E55.9 VITAMIN D DEFICIENCY: ICD-10-CM

## 2024-06-17 DIAGNOSIS — E53.8 B12 DEFICIENCY: ICD-10-CM

## 2024-06-17 DIAGNOSIS — E78.2 MIXED HYPERLIPIDEMIA: ICD-10-CM

## 2024-06-17 LAB
25(OH)D3 SERPL-MCNC: 25 NG/ML (ref 30–100)
ALBUMIN SERPL BCP-MCNC: 4.1 G/DL (ref 3.2–4.9)
ALBUMIN/GLOB SERPL: 1.4 G/DL
ALP SERPL-CCNC: 85 U/L (ref 30–99)
ALT SERPL-CCNC: 20 U/L (ref 2–50)
ANION GAP SERPL CALC-SCNC: 11 MMOL/L (ref 7–16)
AST SERPL-CCNC: 20 U/L (ref 12–45)
BASOPHILS # BLD AUTO: 0.7 % (ref 0–1.8)
BASOPHILS # BLD: 0.06 K/UL (ref 0–0.12)
BILIRUB SERPL-MCNC: 0.4 MG/DL (ref 0.1–1.5)
BUN SERPL-MCNC: 10 MG/DL (ref 8–22)
CALCIUM ALBUM COR SERPL-MCNC: 8.8 MG/DL (ref 8.5–10.5)
CALCIUM SERPL-MCNC: 8.9 MG/DL (ref 8.5–10.5)
CHLORIDE SERPL-SCNC: 103 MMOL/L (ref 96–112)
CHOLEST SERPL-MCNC: 219 MG/DL (ref 100–199)
CO2 SERPL-SCNC: 24 MMOL/L (ref 20–33)
CREAT SERPL-MCNC: 0.72 MG/DL (ref 0.5–1.4)
EOSINOPHIL # BLD AUTO: 0.11 K/UL (ref 0–0.51)
EOSINOPHIL NFR BLD: 1.3 % (ref 0–6.9)
ERYTHROCYTE [DISTWIDTH] IN BLOOD BY AUTOMATED COUNT: 42.5 FL (ref 35.9–50)
GFR SERPLBLD CREATININE-BSD FMLA CKD-EPI: 107 ML/MIN/1.73 M 2
GLOBULIN SER CALC-MCNC: 2.9 G/DL (ref 1.9–3.5)
GLUCOSE SERPL-MCNC: 85 MG/DL (ref 65–99)
HCT VFR BLD AUTO: 47.5 % (ref 37–47)
HDLC SERPL-MCNC: 67 MG/DL
HGB BLD-MCNC: 16.1 G/DL (ref 12–16)
IMM GRANULOCYTES # BLD AUTO: 0.02 K/UL (ref 0–0.11)
IMM GRANULOCYTES NFR BLD AUTO: 0.2 % (ref 0–0.9)
LDLC SERPL CALC-MCNC: 141 MG/DL
LYMPHOCYTES # BLD AUTO: 2.94 K/UL (ref 1–4.8)
LYMPHOCYTES NFR BLD: 36.1 % (ref 22–41)
MCH RBC QN AUTO: 30.9 PG (ref 27–33)
MCHC RBC AUTO-ENTMCNC: 33.9 G/DL (ref 32.2–35.5)
MCV RBC AUTO: 91.2 FL (ref 81.4–97.8)
MONOCYTES # BLD AUTO: 0.49 K/UL (ref 0–0.85)
MONOCYTES NFR BLD AUTO: 6 % (ref 0–13.4)
NEUTROPHILS # BLD AUTO: 4.53 K/UL (ref 1.82–7.42)
NEUTROPHILS NFR BLD: 55.7 % (ref 44–72)
NRBC # BLD AUTO: 0 K/UL
NRBC BLD-RTO: 0 /100 WBC (ref 0–0.2)
PLATELET # BLD AUTO: 215 K/UL (ref 164–446)
PMV BLD AUTO: 10.6 FL (ref 9–12.9)
POTASSIUM SERPL-SCNC: 4.3 MMOL/L (ref 3.6–5.5)
PROT SERPL-MCNC: 7 G/DL (ref 6–8.2)
RBC # BLD AUTO: 5.21 M/UL (ref 4.2–5.4)
SODIUM SERPL-SCNC: 138 MMOL/L (ref 135–145)
TRIGL SERPL-MCNC: 53 MG/DL (ref 0–149)
TSH SERPL DL<=0.005 MIU/L-ACNC: 4.51 UIU/ML (ref 0.38–5.33)
VIT B12 SERPL-MCNC: 1192 PG/ML (ref 211–911)
WBC # BLD AUTO: 8.2 K/UL (ref 4.8–10.8)

## 2024-06-17 PROCEDURE — 85025 COMPLETE CBC W/AUTO DIFF WBC: CPT

## 2024-06-17 PROCEDURE — 82607 VITAMIN B-12: CPT

## 2024-06-17 PROCEDURE — 80061 LIPID PANEL: CPT

## 2024-06-17 PROCEDURE — 36415 COLL VENOUS BLD VENIPUNCTURE: CPT

## 2024-06-17 PROCEDURE — 80053 COMPREHEN METABOLIC PANEL: CPT

## 2024-06-17 PROCEDURE — 84443 ASSAY THYROID STIM HORMONE: CPT

## 2024-06-17 PROCEDURE — 82306 VITAMIN D 25 HYDROXY: CPT

## 2024-06-21 DIAGNOSIS — F90.0 ATTENTION DEFICIT HYPERACTIVITY DISORDER (ADHD), PREDOMINANTLY INATTENTIVE TYPE: ICD-10-CM

## 2024-06-21 RX ORDER — DEXTROAMPHETAMINE SACCHARATE, AMPHETAMINE ASPARTATE MONOHYDRATE, DEXTROAMPHETAMINE SULFATE AND AMPHETAMINE SULFATE 2.5; 2.5; 2.5; 2.5 MG/1; MG/1; MG/1; MG/1
20 CAPSULE, EXTENDED RELEASE ORAL EVERY MORNING
Qty: 60 CAPSULE | Refills: 0 | Status: SHIPPED | OUTPATIENT
Start: 2024-06-21 | End: 2024-07-21

## 2024-07-30 DIAGNOSIS — F90.0 ATTENTION DEFICIT HYPERACTIVITY DISORDER (ADHD), PREDOMINANTLY INATTENTIVE TYPE: ICD-10-CM

## 2024-07-30 RX ORDER — DEXTROAMPHETAMINE SACCHARATE, AMPHETAMINE ASPARTATE MONOHYDRATE, DEXTROAMPHETAMINE SULFATE AND AMPHETAMINE SULFATE 2.5; 2.5; 2.5; 2.5 MG/1; MG/1; MG/1; MG/1
20 CAPSULE, EXTENDED RELEASE ORAL EVERY MORNING
Qty: 60 CAPSULE | Refills: 0 | Status: SHIPPED | OUTPATIENT
Start: 2024-07-30 | End: 2024-08-29

## 2024-08-12 DIAGNOSIS — Z96.649 STATUS POST HIP REPLACEMENT, UNSPECIFIED LATERALITY: ICD-10-CM

## 2024-08-12 RX ORDER — MELOXICAM 7.5 MG/1
TABLET ORAL
Qty: 90 TABLET | Refills: 0 | Status: SHIPPED | OUTPATIENT
Start: 2024-08-12

## 2024-08-29 ENCOUNTER — APPOINTMENT (OUTPATIENT)
Dept: MEDICAL GROUP | Age: 44
End: 2024-08-29
Payer: COMMERCIAL

## 2024-08-29 VITALS
OXYGEN SATURATION: 98 % | DIASTOLIC BLOOD PRESSURE: 82 MMHG | RESPIRATION RATE: 18 BRPM | BODY MASS INDEX: 28.72 KG/M2 | HEIGHT: 61 IN | SYSTOLIC BLOOD PRESSURE: 122 MMHG | HEART RATE: 110 BPM | TEMPERATURE: 97 F

## 2024-08-29 DIAGNOSIS — E66.3 OVERWEIGHT (BMI 25.0-29.9): ICD-10-CM

## 2024-08-29 DIAGNOSIS — E78.2 MIXED HYPERLIPIDEMIA: ICD-10-CM

## 2024-08-29 DIAGNOSIS — E03.9 ACQUIRED HYPOTHYROIDISM: ICD-10-CM

## 2024-08-29 DIAGNOSIS — F90.0 ATTENTION DEFICIT HYPERACTIVITY DISORDER (ADHD), PREDOMINANTLY INATTENTIVE TYPE: ICD-10-CM

## 2024-08-29 PROBLEM — E66.9 OBESITY (BMI 30-39.9): Status: RESOLVED | Noted: 2023-12-04 | Resolved: 2024-08-29

## 2024-08-29 RX ORDER — DEXTROAMPHETAMINE SACCHARATE, AMPHETAMINE ASPARTATE MONOHYDRATE, DEXTROAMPHETAMINE SULFATE AND AMPHETAMINE SULFATE 2.5; 2.5; 2.5; 2.5 MG/1; MG/1; MG/1; MG/1
20 CAPSULE, EXTENDED RELEASE ORAL EVERY MORNING
Qty: 60 CAPSULE | Refills: 0 | Status: SHIPPED | OUTPATIENT
Start: 2024-08-29 | End: 2024-09-28

## 2024-08-29 RX ORDER — DEXTROAMPHETAMINE SACCHARATE, AMPHETAMINE ASPARTATE, DEXTROAMPHETAMINE SULFATE AND AMPHETAMINE SULFATE 2.5; 2.5; 2.5; 2.5 MG/1; MG/1; MG/1; MG/1
10 TABLET ORAL
Qty: 30 TABLET | Refills: 0 | Status: SHIPPED | OUTPATIENT
Start: 2024-08-29 | End: 2024-09-28

## 2024-08-29 RX ORDER — DEXTROAMPHETAMINE SACCHARATE, AMPHETAMINE ASPARTATE MONOHYDRATE, DEXTROAMPHETAMINE SULFATE AND AMPHETAMINE SULFATE 2.5; 2.5; 2.5; 2.5 MG/1; MG/1; MG/1; MG/1
20 CAPSULE, EXTENDED RELEASE ORAL EVERY MORNING
Qty: 60 CAPSULE | Refills: 0 | Status: SHIPPED | OUTPATIENT
Start: 2024-10-28 | End: 2024-11-27

## 2024-08-29 RX ORDER — DEXTROAMPHETAMINE SACCHARATE, AMPHETAMINE ASPARTATE MONOHYDRATE, DEXTROAMPHETAMINE SULFATE AND AMPHETAMINE SULFATE 2.5; 2.5; 2.5; 2.5 MG/1; MG/1; MG/1; MG/1
20 CAPSULE, EXTENDED RELEASE ORAL EVERY MORNING
Qty: 60 CAPSULE | Refills: 0 | Status: SHIPPED | OUTPATIENT
Start: 2024-09-28 | End: 2024-10-28

## 2024-08-29 NOTE — PROGRESS NOTES
cc: Medication/lab review    Subjective:     HPI  Sara Taylor is a 44 y.o. female presenting medication review.  She is not having difficulties getting Adderall filled due to shortages.  Initially was started on 10 mg Adderall twice daily.  She had pretty highs and lows and did not like the way that she felt when the medication wore off.  Thus she was switched to 20 mg Adderall XR.  With this dose she has found a very effective.  However on days that she is working half days or is on the weekends almost finds it too stimulating.  She did have leftover of the 10 mg IR, but take this on the weekends if needed usually half a tablet she found this combination to be very effective for her.    She is also currently on Mounjaro.  Prescribed through weight loss clinic.  She is wondering if I can take over prescribing this she is currently on 5 mg.  Has lost about 20 pounds        Review of systems:  See above.    Latest Reference Range & Units 06/17/24 10:21   WBC 4.8 - 10.8 K/uL 8.2   RBC 4.20 - 5.40 M/uL 5.21   Hemoglobin 12.0 - 16.0 g/dL 16.1 (H)   Hematocrit 37.0 - 47.0 % 47.5 (H)   MCV 81.4 - 97.8 fL 91.2   MCH 27.0 - 33.0 pg 30.9   MCHC 32.2 - 35.5 g/dL 33.9   RDW 35.9 - 50.0 fL 42.5   Platelet Count 164 - 446 K/uL 215   MPV 9.0 - 12.9 fL 10.6   Neutrophils-Polys 44.00 - 72.00 % 55.70   Neutrophils (Absolute) 1.82 - 7.42 K/uL 4.53   Lymphocytes 22.00 - 41.00 % 36.10   Lymphs (Absolute) 1.00 - 4.80 K/uL 2.94   Monocytes 0.00 - 13.40 % 6.00   Monos (Absolute) 0.00 - 0.85 K/uL 0.49   Eosinophils 0.00 - 6.90 % 1.30   Eos (Absolute) 0.00 - 0.51 K/uL 0.11   Basophils 0.00 - 1.80 % 0.70   Baso (Absolute) 0.00 - 0.12 K/uL 0.06   Immature Granulocytes 0.00 - 0.90 % 0.20   Immature Granulocytes (abs) 0.00 - 0.11 K/uL 0.02   Nucleated RBC 0.00 - 0.20 /100 WBC 0.00   NRBC (Absolute) K/uL 0.00   Sodium 135 - 145 mmol/L 138   Potassium 3.6 - 5.5 mmol/L 4.3   Chloride 96 - 112 mmol/L 103   Co2 20 - 33 mmol/L 24   Anion Gap 7.0  - 16.0  11.0   Glucose 65 - 99 mg/dL 85   Bun 8 - 22 mg/dL 10   Creatinine 0.50 - 1.40 mg/dL 0.72   GFR (CKD-EPI) >60 mL/min/1.73 m 2 107   Calcium 8.5 - 10.5 mg/dL 8.9   Correct Calcium 8.5 - 10.5 mg/dL 8.8   AST(SGOT) 12 - 45 U/L 20   ALT(SGPT) 2 - 50 U/L 20   Alkaline Phosphatase 30 - 99 U/L 85   Total Bilirubin 0.1 - 1.5 mg/dL 0.4   Albumin 3.2 - 4.9 g/dL 4.1   Total Protein 6.0 - 8.2 g/dL 7.0   Globulin 1.9 - 3.5 g/dL 2.9   A-G Ratio g/dL 1.4   Cholesterol,Tot 100 - 199 mg/dL 219 (H)   Triglycerides 0 - 149 mg/dL 53   HDL >=40 mg/dL 67   LDL <100 mg/dL 141 (H)   25-Hydroxy   Vitamin D 25 30 - 100 ng/mL 25 (L)   Vitamin B12 -True Cobalamin 211 - 911 pg/mL 1192 (H)   TSH 0.380 - 5.330 uIU/mL 4.510   (H): Data is abnormally high  (L): Data is abnormally low    Current Outpatient Medications:     amphetamine-dextroamphetamine XR (ADDERALL XR) 10 MG CAPSULE SR 24 HR, Take 2 Capsules by mouth every morning for 30 days., Disp: 60 Capsule, Rfl: 0    [START ON 9/28/2024] amphetamine-dextroamphetamine XR (ADDERALL XR) 10 MG CAPSULE SR 24 HR, Take 2 Capsules by mouth every morning for 30 days., Disp: 60 Capsule, Rfl: 0    [START ON 10/28/2024] amphetamine-dextroamphetamine XR (ADDERALL XR) 10 MG CAPSULE SR 24 HR, Take 2 Capsules by mouth every morning for 30 days., Disp: 60 Capsule, Rfl: 0    amphetamine-dextroamphetamine (ADDERALL) 10 MG Tab, Take 1 Tablet by mouth 1 time a day as needed (In afternoon if needed) for up to 30 days., Disp: 30 Tablet, Rfl: 0    meloxicam (MOBIC) 7.5 MG Tab, TAKE ONE TABLET BY MOUTH ONE TIME DAILY AS NEEDED FOR MODERATE PAIN, Disp: 90 Tablet, Rfl: 0    cyanocobalamin (VITAMIN B-12) 1000 MCG/ML Solution, INJECT 1ML INTO THE SHOULDER, THIGH, OR BUTTOCKS EVERY 30 DAYS FOR 30 DAYS., Disp: 10 mL, Rfl: 0    levothyroxine (SYNTHROID) 25 MCG Tab, Take 1 Tablet by mouth every morning on an empty stomach., Disp: 90 Tablet, Rfl: 0    cyclobenzaprine (FLEXERIL) 10 mg Tab, Take 1 Tablet by mouth 3 times  "a day as needed for Muscle Spasms., Disp: 90 Tablet, Rfl: 0    hydroquinone 4 % cream, Apply 1 Application topically 1 time a day as needed (rash)., Disp: 28.35 g, Rfl: 2    Adapalene 0.3 % Gel, , Disp: , Rfl:     oxyCODONE-acetaminophen (PERCOCET) 5-325 MG Tab, , Disp: , Rfl:     Probiotic Product (PROBIOTIC PO), Take 1 Capsule by mouth every day., Disp: , Rfl:     Ferrous Sulfate (IRON PO), Take 325 mg by mouth 1 time a day as needed. During menstrual cycle, Disp: , Rfl:     FIBER PO, Take 1 Scoop by mouth every day., Disp: , Rfl:     Cholecalciferol (VITAMIN D3) 5000 units Tab, Take 5,000 Units by mouth every day., Disp: 30 Tab, Rfl:     Allergies, past medical history, past surgical history, family history, social history reviewed and updated    Objective:     Vitals: /82 (BP Location: Left arm, Patient Position: Sitting, BP Cuff Size: Adult)   Pulse (!) 110   Temp 36.1 °C (97 °F) (Temporal)   Resp 18   Ht 1.549 m (5' 1\")   SpO2 98%   BMI 28.72 kg/m²   General: Alert, pleasant, NAD  HEENT: Normocephalic. Neck supple.  No thyromegaly or masses palpated. No cervical or supraclavicular lymphadenopathy. No carotid bruits   Heart: Regular rate and rhythm.  S1 and S2 normal.  No murmurs appreciated.  Respiratory: Normal respiratory effort.  Clear to auscultation bilaterally.  Skin: Warm, dry, no rashes.  Psych:  Affect/mood is normal, judgement is good, memory is intact, grooming is appropriate.    Assessment/Plan:     Sara was seen today for follow-up.    Diagnoses and all orders for this visit:    Attention deficit hyperactivity disorder (ADHD), predominantly inattentive type  -Stable.  Will continue 20 mg XR Adderall (10 mg XR sent to the pharmacy due to started) daily.  Then can use either 5 or 10 mg on the weekends.  PDMP checked.  Prescription sent to pharmacy  -     amphetamine-dextroamphetamine XR (ADDERALL XR) 10 MG CAPSULE SR 24 HR; Take 2 Capsules by mouth every morning for 30 days.  -     " amphetamine-dextroamphetamine XR (ADDERALL XR) 10 MG CAPSULE SR 24 HR; Take 2 Capsules by mouth every morning for 30 days.  -     amphetamine-dextroamphetamine XR (ADDERALL XR) 10 MG CAPSULE SR 24 HR; Take 2 Capsules by mouth every morning for 30 days.  -     amphetamine-dextroamphetamine (ADDERALL) 10 MG Tab; Take 1 Tablet by mouth 1 time a day as needed (In afternoon if needed) for up to 30 days.    Acquired hypothyroidism  Stable.  Continue 25 mcg of levothyroxine     Overweight (BMI 25.0-29.9)  Did discuss that I could take over prescribing the Mounjaro for her.  However would advise her to call her insurance first to see if they even cover the medication.  As it still might be cost effective for her to go to the weight loss clinic.  If they do we will follow-up to discuss further.     Mixed hyperlipidemia  Improving, but still elevated.  Continue with lifestyle modifications      Return in about 3 months (around 11/29/2024) for Medication Check.

## 2024-09-21 NOTE — ANESTHESIA PROCEDURE NOTES
Airway    Date/Time: 7/11/2022 11:34 AM  Performed by: Chandler Benavides M.D.  Authorized by: Chandler Benavides M.D.     Location:  OR  Urgency:  Elective  Indications for Airway Management:  Anesthesia      Spontaneous Ventilation: absent    Sedation Level:  Deep  Preoxygenated: Yes    Patient Position:  Sniffing  Final Airway Type:  Endotracheal airway  Final Endotracheal Airway:  ETT  Cuffed: Yes    Technique Used for Successful ETT Placement:  Direct laryngoscopy    Insertion Site:  Oral  Blade Type:  Mona  Laryngoscope Blade/Videolaryngoscope Blade Size:  3  ETT Size (mm):  7.0  Measured from:  Teeth  ETT to Teeth (cm):  21  Placement Verified by: auscultation and capnometry    Cormack-Lehane Classification:  Grade I - full view of glottis  Number of Attempts at Approach:  1            used

## 2024-10-03 DIAGNOSIS — F90.0 ATTENTION DEFICIT HYPERACTIVITY DISORDER (ADHD), PREDOMINANTLY INATTENTIVE TYPE: ICD-10-CM

## 2024-10-03 RX ORDER — DEXTROAMPHETAMINE SACCHARATE, AMPHETAMINE ASPARTATE MONOHYDRATE, DEXTROAMPHETAMINE SULFATE AND AMPHETAMINE SULFATE 2.5; 2.5; 2.5; 2.5 MG/1; MG/1; MG/1; MG/1
20 CAPSULE, EXTENDED RELEASE ORAL EVERY MORNING
Qty: 60 CAPSULE | Refills: 0 | Status: SHIPPED | OUTPATIENT
Start: 2024-10-03 | End: 2024-11-02

## 2024-11-06 DIAGNOSIS — Z96.649 STATUS POST HIP REPLACEMENT, UNSPECIFIED LATERALITY: ICD-10-CM

## 2024-11-07 RX ORDER — MELOXICAM 7.5 MG/1
TABLET ORAL
Qty: 90 TABLET | Refills: 0 | Status: SHIPPED | OUTPATIENT
Start: 2024-11-07

## 2024-11-08 ENCOUNTER — APPOINTMENT (OUTPATIENT)
Dept: MEDICAL GROUP | Age: 44
End: 2024-11-08
Payer: COMMERCIAL

## 2024-11-08 VITALS
TEMPERATURE: 97 F | BODY MASS INDEX: 27.94 KG/M2 | SYSTOLIC BLOOD PRESSURE: 120 MMHG | HEART RATE: 84 BPM | DIASTOLIC BLOOD PRESSURE: 74 MMHG | HEIGHT: 61 IN | OXYGEN SATURATION: 98 % | WEIGHT: 148 LBS

## 2024-11-08 DIAGNOSIS — G43.109 MIGRAINE WITH AURA AND WITHOUT STATUS MIGRAINOSUS, NOT INTRACTABLE: ICD-10-CM

## 2024-11-08 DIAGNOSIS — E03.9 ACQUIRED HYPOTHYROIDISM: ICD-10-CM

## 2024-11-08 DIAGNOSIS — F90.0 ATTENTION DEFICIT HYPERACTIVITY DISORDER (ADHD), PREDOMINANTLY INATTENTIVE TYPE: ICD-10-CM

## 2024-11-08 PROCEDURE — 3074F SYST BP LT 130 MM HG: CPT | Performed by: PHYSICIAN ASSISTANT

## 2024-11-08 PROCEDURE — 99215 OFFICE O/P EST HI 40 MIN: CPT | Performed by: PHYSICIAN ASSISTANT

## 2024-11-08 PROCEDURE — 3078F DIAST BP <80 MM HG: CPT | Performed by: PHYSICIAN ASSISTANT

## 2024-11-08 RX ORDER — DEXTROAMPHETAMINE SACCHARATE, AMPHETAMINE ASPARTATE, DEXTROAMPHETAMINE SULFATE AND AMPHETAMINE SULFATE 2.5; 2.5; 2.5; 2.5 MG/1; MG/1; MG/1; MG/1
10 TABLET ORAL
Qty: 30 TABLET | Refills: 0 | Status: SHIPPED | OUTPATIENT
Start: 2025-01-07 | End: 2025-02-06

## 2024-11-08 RX ORDER — DEXTROAMPHETAMINE SACCHARATE, AMPHETAMINE ASPARTATE, DEXTROAMPHETAMINE SULFATE AND AMPHETAMINE SULFATE 2.5; 2.5; 2.5; 2.5 MG/1; MG/1; MG/1; MG/1
10 TABLET ORAL
Qty: 30 TABLET | Refills: 0 | Status: SHIPPED | OUTPATIENT
Start: 2024-11-08 | End: 2024-11-08

## 2024-11-08 RX ORDER — DEXTROAMPHETAMINE SACCHARATE, AMPHETAMINE ASPARTATE MONOHYDRATE, DEXTROAMPHETAMINE SULFATE AND AMPHETAMINE SULFATE 2.5; 2.5; 2.5; 2.5 MG/1; MG/1; MG/1; MG/1
20 CAPSULE, EXTENDED RELEASE ORAL EVERY MORNING
Qty: 60 CAPSULE | Refills: 0 | Status: SHIPPED | OUTPATIENT
Start: 2025-01-07 | End: 2025-02-06

## 2024-11-08 RX ORDER — DEXTROAMPHETAMINE SACCHARATE, AMPHETAMINE ASPARTATE, DEXTROAMPHETAMINE SULFATE AND AMPHETAMINE SULFATE 2.5; 2.5; 2.5; 2.5 MG/1; MG/1; MG/1; MG/1
10 TABLET ORAL
Qty: 30 TABLET | Refills: 0 | Status: SHIPPED | OUTPATIENT
Start: 2024-11-08 | End: 2024-12-08

## 2024-11-08 RX ORDER — DEXTROAMPHETAMINE SACCHARATE, AMPHETAMINE ASPARTATE MONOHYDRATE, DEXTROAMPHETAMINE SULFATE AND AMPHETAMINE SULFATE 2.5; 2.5; 2.5; 2.5 MG/1; MG/1; MG/1; MG/1
20 CAPSULE, EXTENDED RELEASE ORAL EVERY MORNING
Qty: 60 CAPSULE | Refills: 0 | Status: SHIPPED | OUTPATIENT
Start: 2024-12-08 | End: 2025-01-07

## 2024-11-08 RX ORDER — SUMATRIPTAN 50 MG/1
50 TABLET, FILM COATED ORAL
Qty: 10 TABLET | Refills: 3 | Status: SHIPPED | OUTPATIENT
Start: 2024-11-08

## 2024-11-08 RX ORDER — DEXTROAMPHETAMINE SACCHARATE, AMPHETAMINE ASPARTATE MONOHYDRATE, DEXTROAMPHETAMINE SULFATE AND AMPHETAMINE SULFATE 2.5; 2.5; 2.5; 2.5 MG/1; MG/1; MG/1; MG/1
20 CAPSULE, EXTENDED RELEASE ORAL EVERY MORNING
Qty: 60 CAPSULE | Refills: 0 | Status: SHIPPED | OUTPATIENT
Start: 2024-11-08 | End: 2024-12-08

## 2024-11-08 RX ORDER — ONDANSETRON 4 MG/1
4 TABLET, FILM COATED ORAL EVERY 4 HOURS PRN
Qty: 30 TABLET | Refills: 1 | Status: SHIPPED | OUTPATIENT
Start: 2024-11-08 | End: 2024-12-08

## 2024-11-08 RX ORDER — DEXTROAMPHETAMINE SACCHARATE, AMPHETAMINE ASPARTATE, DEXTROAMPHETAMINE SULFATE AND AMPHETAMINE SULFATE 2.5; 2.5; 2.5; 2.5 MG/1; MG/1; MG/1; MG/1
10 TABLET ORAL
Qty: 30 TABLET | Refills: 0 | Status: SHIPPED | OUTPATIENT
Start: 2024-12-08 | End: 2025-01-07

## 2024-11-08 ASSESSMENT — FIBROSIS 4 INDEX: FIB4 SCORE: 0.87

## 2024-11-09 NOTE — PROGRESS NOTES
cc: Migraines and medication review    Subjective:     HPI    History of Present Illness  The patient is a 44-year-old female presenting with concerns of worsening migraines and for a medication review.    She reports experiencing sporadic migraines, which she attributes to light sensitivity and neck tension from her work. She also suspects hormonal changes during pregnancy and perimenopause may be contributing factors. She has been experiencing these migraines since she was 20 years old. The onset of a migraine is described as a feeling of impending sickness, followed by vomiting. Her most recent migraine episode started last Saturday, worsened on Sunday, and lasted for three days. She managed the episode by taking her old medication that was prescribed during her pregnancy, staying in a dark room, wearing sunglasses indoors, and drinking plenty of water. Over the past two months, her migraines have been worsening, with at least three episodes in the last month and two episodes the month before. She is currently experiencing a migraine behind her eyes.  Has never tried anything else for abortive therapies.  OTCs medications not effective      She is currently on Adderall XR 20 mg,  in the morning and an additional 5-10 mg around 2 or 3 pm.  If needed depending on her day..  She occasionally skips doses in the afternoon. She expresses a desire to eventually discontinue the medication entirely. She also mentions that she did not receive her 10 mg tablets last month and has run out of the medication.    She has stopped taking her thyroid medication as she did not notice any difference while on it. She was initially prescribed the medication at a fertility clinic when she was trying to conceive. She has been off the medication for about a year.  Last TSH level completed in June was within normal limits.    She is seeing a homeopathic provider, that has ordered a full lab panel, TSH is included          Review of  systems:  See above.       Current Outpatient Medications:     amphetamine-dextroamphetamine (ADDERALL) 10 MG Tab, Take 1 Tablet by mouth 1 time a day as needed (In the afternoon if needed) for up to 30 days., Disp: 30 Tablet, Rfl: 0    [START ON 12/8/2024] amphetamine-dextroamphetamine (ADDERALL) 10 MG Tab, Take 1 Tablet by mouth 1 time a day as needed (In the afternoon if needed) for up to 30 days., Disp: 30 Tablet, Rfl: 0    [START ON 1/7/2025] amphetamine-dextroamphetamine (ADDERALL) 10 MG Tab, Take 1 Tablet by mouth 1 time a day as needed (In the afternoon if needed) for up to 30 days., Disp: 30 Tablet, Rfl: 0    amphetamine-dextroamphetamine XR (ADDERALL XR) 10 MG CAPSULE SR 24 HR, Take 2 Capsules by mouth every morning for 30 days., Disp: 60 Capsule, Rfl: 0    [START ON 12/8/2024] amphetamine-dextroamphetamine XR (ADDERALL XR) 10 MG CAPSULE SR 24 HR, Take 2 Capsules by mouth every morning for 30 days., Disp: 60 Capsule, Rfl: 0    [START ON 1/7/2025] amphetamine-dextroamphetamine XR (ADDERALL XR) 10 MG CAPSULE SR 24 HR, Take 2 Capsules by mouth every morning for 30 days., Disp: 60 Capsule, Rfl: 0    SUMAtriptan (IMITREX) 50 MG Tab, Take 1 Tablet by mouth one time as needed for Migraine for up to 1 dose. May repeat dose in 2 hours if no resolution of migraine, Disp: 10 Tablet, Rfl: 3    ondansetron (ZOFRAN) 4 MG Tab tablet, Take 1 Tablet by mouth every four hours as needed for Nausea/Vomiting for up to 30 days., Disp: 30 Tablet, Rfl: 1    meloxicam (MOBIC) 7.5 MG Tab, TAKE ONE TABLET BY MOUTH DAILY AS NEEDED FOR MODERATE PAIN, Disp: 90 Tablet, Rfl: 0    cyanocobalamin (VITAMIN B-12) 1000 MCG/ML Solution, INJECT 1ML INTO THE SHOULDER, THIGH, OR BUTTOCKS EVERY 30 DAYS FOR 30 DAYS., Disp: 10 mL, Rfl: 0    cyclobenzaprine (FLEXERIL) 10 mg Tab, Take 1 Tablet by mouth 3 times a day as needed for Muscle Spasms., Disp: 90 Tablet, Rfl: 0    Probiotic Product (PROBIOTIC PO), Take 1 Capsule by mouth every day., Disp: ,  "Rfl:     Ferrous Sulfate (IRON PO), Take 325 mg by mouth 1 time a day as needed. During menstrual cycle, Disp: , Rfl:     FIBER PO, Take 1 Scoop by mouth every day., Disp: , Rfl:     Cholecalciferol (VITAMIN D3) 5000 units Tab, Take 5,000 Units by mouth every day., Disp: 30 Tab, Rfl:     Allergies, past medical history, past surgical history, family history, social history reviewed and updated    Objective:     Vitals: /74 (BP Location: Left arm, Patient Position: Sitting, BP Cuff Size: Adult)   Pulse 84   Temp 36.1 °C (97 °F) (Temporal)   Ht 1.549 m (5' 1\")   Wt 67.1 kg (148 lb)   SpO2 98%   BMI 27.96 kg/m²   General: Alert, pleasant, NAD  HEENT: Normocephalic. Neck supple.  No thyromegaly or masses palpated. No cervical or supraclavicular lymphadenopathy. No carotid bruits   Heart: Regular rate and rhythm.  S1 and S2 normal.  No murmurs appreciated.  Respiratory: Normal respiratory effort.  Clear to auscultation bilaterally.  Skin: Warm, dry, no rashes.  Neurological: No tremors, sensation grossly intact, tone/strength normal, gait is normal, rapid movements normal, finger-to-nose intact, heel-knee-shin intact, CN2-12 intact, no pronator drift, romberg negative, no clonus   Psych:  Affect/mood is normal, judgement is good, memory is intact, grooming is appropriate.    Assessment/Plan:     Sara was seen today for migraine and medication follow-up.    Diagnoses and all orders for this visit:    Migraine with aura and without status migrainosus, not intractable  -Uncontrolled.  Has never been tried on sumatriptan for abortive therapies.  Has having 2-3 migraines a month.  Will start on sumatriptan.  Advised to take half a tablet first to 25 mg if this is not effective and is tolerating well can take 50 mg tablet up to 100 to dose effect.  Also given Zofran for the nausea.  -     SUMAtriptan (IMITREX) 50 MG Tab; Take 1 Tablet by mouth one time as needed for Migraine for up to 1 dose. May repeat dose in 2 " hours if no resolution of migraine  -     ondansetron (ZOFRAN) 4 MG Tab tablet; Take 1 Tablet by mouth every four hours as needed for Nausea/Vomiting for up to 30 days.    Acquired hypothyroidism  -Does have the diagnosis of acquired hypothyroidism on her problem list.  However in discussion this was prescribed during fertility treatments.  She has been off of levothyroxine for over a year.  Last TSH level within normal limits.  She does have full panel lab ordered through homeopathic provider.  She will bring those lab orders in as long as TSH levels within normal limits would agree she does not need to continue with the levothyroxine  -     TSH; Future    Attention deficit hyperactivity disorder (ADHD), predominantly inattentive type  -Overall is managing well with 20 mg XR Adderall in the morning, then either 5-10 short release Adderall in the afternoon if needed.  PDMP checked.  Prescription sent to the pharmacy.  -     amphetamine-dextroamphetamine (ADDERALL) 10 MG Tab; Take 1 Tablet by mouth 1 time a day as needed (In the afternoon if needed) for up to 30 days.  -     amphetamine-dextroamphetamine (ADDERALL) 10 MG Tab; Take 1 Tablet by mouth 1 time a day as needed (In the afternoon if needed) for up to 30 days.  -     amphetamine-dextroamphetamine (ADDERALL) 10 MG Tab; Take 1 Tablet by mouth 1 time a day as needed (In the afternoon if needed) for up to 30 days.  -     amphetamine-dextroamphetamine XR (ADDERALL XR) 10 MG CAPSULE SR 24 HR; Take 2 Capsules by mouth every morning for 30 days.  -     amphetamine-dextroamphetamine XR (ADDERALL XR) 10 MG CAPSULE SR 24 HR; Take 2 Capsules by mouth every morning for 30 days.  -     amphetamine-dextroamphetamine XR (ADDERALL XR) 10 MG CAPSULE SR 24 HR; Take 2 Capsules by mouth every morning for 30 days.    My total time spent caring for the patient on the day of the encounter was 40 minutes.   This does not include time spent on separately billable  procedures/tests.        Return in about 3 months (around 2/8/2025) for Medication Check.

## 2025-02-06 DIAGNOSIS — Z96.649 STATUS POST HIP REPLACEMENT, UNSPECIFIED LATERALITY: ICD-10-CM

## 2025-02-06 RX ORDER — MELOXICAM 7.5 MG/1
TABLET ORAL
Qty: 90 TABLET | Refills: 0 | Status: SHIPPED | OUTPATIENT
Start: 2025-02-06

## 2025-03-27 ENCOUNTER — TELEPHONE (OUTPATIENT)
Dept: MEDICAL GROUP | Age: 45
End: 2025-03-27
Payer: COMMERCIAL

## 2025-03-27 DIAGNOSIS — F90.0 ATTENTION DEFICIT HYPERACTIVITY DISORDER (ADHD), PREDOMINANTLY INATTENTIVE TYPE: ICD-10-CM

## 2025-03-27 RX ORDER — DEXTROAMPHETAMINE SACCHARATE, AMPHETAMINE ASPARTATE, DEXTROAMPHETAMINE SULFATE AND AMPHETAMINE SULFATE 2.5; 2.5; 2.5; 2.5 MG/1; MG/1; MG/1; MG/1
10 TABLET ORAL
Qty: 30 TABLET | Refills: 0 | Status: SHIPPED | OUTPATIENT
Start: 2025-03-27 | End: 2025-04-26

## 2025-03-27 NOTE — TELEPHONE ENCOUNTER
Phone Number Called: [phone]     Call outcome: Did not leave a detailed message. Requested patient to call back.    Message: called and left voicemail stating to give us a call back for clarification on medication.

## 2025-05-08 DIAGNOSIS — F90.0 ATTENTION DEFICIT HYPERACTIVITY DISORDER (ADHD), PREDOMINANTLY INATTENTIVE TYPE: ICD-10-CM

## 2025-05-08 DIAGNOSIS — G43.109 MIGRAINE WITH AURA AND WITHOUT STATUS MIGRAINOSUS, NOT INTRACTABLE: ICD-10-CM

## 2025-05-08 RX ORDER — SUMATRIPTAN 50 MG/1
50 TABLET, FILM COATED ORAL
Qty: 10 TABLET | Refills: 3 | Status: SHIPPED | OUTPATIENT
Start: 2025-05-08 | End: 2025-05-12 | Stop reason: SDUPTHER

## 2025-05-08 RX ORDER — DEXTROAMPHETAMINE SACCHARATE, AMPHETAMINE ASPARTATE, DEXTROAMPHETAMINE SULFATE AND AMPHETAMINE SULFATE 2.5; 2.5; 2.5; 2.5 MG/1; MG/1; MG/1; MG/1
10 TABLET ORAL
Qty: 30 TABLET | Refills: 0 | OUTPATIENT
Start: 2025-05-08 | End: 2025-06-07

## 2025-05-08 NOTE — TELEPHONE ENCOUNTER
It has been over 6 months since she has had a medication review.  Adderall is a controlled substance so I cannot fill this until she has an office visit.  Please have her schedule an appointment

## 2025-05-12 ENCOUNTER — TELEMEDICINE (OUTPATIENT)
Dept: MEDICAL GROUP | Age: 45
End: 2025-05-12
Payer: COMMERCIAL

## 2025-05-12 VITALS — HEIGHT: 61 IN | BODY MASS INDEX: 27.96 KG/M2

## 2025-05-12 DIAGNOSIS — G43.109 MIGRAINE WITH AURA AND WITHOUT STATUS MIGRAINOSUS, NOT INTRACTABLE: ICD-10-CM

## 2025-05-12 DIAGNOSIS — M62.838 MUSCLE SPASM: ICD-10-CM

## 2025-05-12 DIAGNOSIS — E55.9 VITAMIN D INSUFFICIENCY: ICD-10-CM

## 2025-05-12 DIAGNOSIS — Z00.00 BLOOD TESTS FOR ROUTINE GENERAL PHYSICAL EXAMINATION: ICD-10-CM

## 2025-05-12 DIAGNOSIS — F90.0 ATTENTION DEFICIT HYPERACTIVITY DISORDER (ADHD), PREDOMINANTLY INATTENTIVE TYPE: ICD-10-CM

## 2025-05-12 DIAGNOSIS — E78.2 MIXED HYPERLIPIDEMIA: ICD-10-CM

## 2025-05-12 PROCEDURE — 99214 OFFICE O/P EST MOD 30 MIN: CPT | Mod: 95 | Performed by: PHYSICIAN ASSISTANT

## 2025-05-12 RX ORDER — DEXTROAMPHETAMINE SACCHARATE, AMPHETAMINE ASPARTATE MONOHYDRATE, DEXTROAMPHETAMINE SULFATE AND AMPHETAMINE SULFATE 2.5; 2.5; 2.5; 2.5 MG/1; MG/1; MG/1; MG/1
20 CAPSULE, EXTENDED RELEASE ORAL EVERY MORNING
Qty: 60 CAPSULE | Refills: 0 | Status: SHIPPED | OUTPATIENT
Start: 2025-07-11 | End: 2025-08-10

## 2025-05-12 RX ORDER — ONDANSETRON 4 MG/1
4 TABLET, FILM COATED ORAL EVERY 4 HOURS PRN
Qty: 20 TABLET | Refills: 1 | Status: SHIPPED | OUTPATIENT
Start: 2025-05-12 | End: 2025-06-11

## 2025-05-12 RX ORDER — DEXTROAMPHETAMINE SACCHARATE, AMPHETAMINE ASPARTATE, DEXTROAMPHETAMINE SULFATE AND AMPHETAMINE SULFATE 2.5; 2.5; 2.5; 2.5 MG/1; MG/1; MG/1; MG/1
10 TABLET ORAL
Qty: 30 TABLET | Refills: 0 | Status: SHIPPED | OUTPATIENT
Start: 2025-06-11 | End: 2025-07-11

## 2025-05-12 RX ORDER — DEXTROAMPHETAMINE SACCHARATE, AMPHETAMINE ASPARTATE MONOHYDRATE, DEXTROAMPHETAMINE SULFATE AND AMPHETAMINE SULFATE 2.5; 2.5; 2.5; 2.5 MG/1; MG/1; MG/1; MG/1
20 CAPSULE, EXTENDED RELEASE ORAL EVERY MORNING
Qty: 60 CAPSULE | Refills: 0 | Status: SHIPPED | OUTPATIENT
Start: 2025-06-11 | End: 2025-07-11

## 2025-05-12 RX ORDER — DEXTROAMPHETAMINE SACCHARATE, AMPHETAMINE ASPARTATE MONOHYDRATE, DEXTROAMPHETAMINE SULFATE AND AMPHETAMINE SULFATE 2.5; 2.5; 2.5; 2.5 MG/1; MG/1; MG/1; MG/1
CAPSULE, EXTENDED RELEASE ORAL
COMMUNITY
Start: 2025-03-25 | End: 2025-05-12 | Stop reason: SDUPTHER

## 2025-05-12 RX ORDER — SUMATRIPTAN 50 MG/1
50 TABLET, FILM COATED ORAL
Qty: 10 TABLET | Refills: 3 | Status: SHIPPED | OUTPATIENT
Start: 2025-05-12

## 2025-05-12 RX ORDER — DEXTROAMPHETAMINE SACCHARATE, AMPHETAMINE ASPARTATE MONOHYDRATE, DEXTROAMPHETAMINE SULFATE AND AMPHETAMINE SULFATE 2.5; 2.5; 2.5; 2.5 MG/1; MG/1; MG/1; MG/1
20 CAPSULE, EXTENDED RELEASE ORAL EVERY MORNING
Qty: 60 CAPSULE | Refills: 0 | Status: SHIPPED | OUTPATIENT
Start: 2025-05-12 | End: 2025-06-11

## 2025-05-12 RX ORDER — CYCLOBENZAPRINE HCL 10 MG
10 TABLET ORAL 3 TIMES DAILY PRN
Qty: 30 TABLET | Refills: 1 | Status: SHIPPED | OUTPATIENT
Start: 2025-05-12

## 2025-05-12 RX ORDER — DEXTROAMPHETAMINE SACCHARATE, AMPHETAMINE ASPARTATE, DEXTROAMPHETAMINE SULFATE AND AMPHETAMINE SULFATE 2.5; 2.5; 2.5; 2.5 MG/1; MG/1; MG/1; MG/1
10 TABLET ORAL
Qty: 30 TABLET | Refills: 0 | Status: SHIPPED | OUTPATIENT
Start: 2025-05-12 | End: 2025-06-11

## 2025-05-12 NOTE — PROGRESS NOTES
Virtual Visit: Established Patient   This visit was conducted via Teams using secure and encrypted videoconferencing technology.   The patient was in their home in the [facility].    The patient's identity was confirmed and verbal consent was obtained for this virtual visit.     Subjective:   CC:   Chief Complaint   Patient presents with    Medication Follow-up       History of Present Illness  The patient is a 44-year-old female presenting via virtual visit for a medication review.    She has been managing her symptoms effectively with Adderall 10-20 mg extended-release. Her daily intake does not exceed 30 mg, typically consisting of one 10 mg extended-release tablet in the morning, followed by a 5 or 10 mg short-release tablet approximately 4 hours later. On particularly challenging days, she may take two 10 mg extended-release tablets in the morning and ensures adequate hydration throughout the day.  She reports that the medication has been beneficial in managing her anxiety and does not wish to discontinue its use.     She has found 50 mg sumatriptan to be highly effective in managing her migraines, which she believes are primarily hormonal in nature. She reports that the medication does not induce drowsiness and has taken two 50 mg tablets on three separate occasions, ensuring they were not administered too closely together.    She is requesting a refill of Zofran.  Occasionally experiences nausea with her migraines.    She occasionally experiences soreness and achiness following her hip replacement surgery, for which she takes meloxicam as needed. She was previously prescribed muscle relaxers by Dr. Zavala, which she takes infrequently due to their sedative effects. She finds them particularly helpful for neck pain and cramping but must take them at least 2 hours before bedtime to avoid feeling overly drowsy.            Current medicines (including changes today)  Current Outpatient Medications    Medication Sig Dispense Refill    SUMAtriptan (IMITREX) 50 MG Tab Take 1 Tablet by mouth one time as needed for Migraine for up to 1 dose. May repeat dose in 2 hours if no resolution of migraine 10 Tablet 3    ondansetron (ZOFRAN) 4 MG Tab tablet Take 1 Tablet by mouth every four hours as needed for Nausea/Vomiting for up to 30 days. 20 Tablet 1    amphetamine-dextroamphetamine XR (ADDERALL XR) 10 MG CAPSULE SR 24 HR Take 2 Capsules by mouth every morning for 30 days. 60 Capsule 0    [START ON 6/11/2025] amphetamine-dextroamphetamine XR (ADDERALL XR) 10 MG CAPSULE SR 24 HR Take 2 Capsules by mouth every morning for 30 days. 60 Capsule 0    [START ON 7/11/2025] amphetamine-dextroamphetamine XR (ADDERALL XR) 10 MG CAPSULE SR 24 HR Take 2 Capsules by mouth every morning for 30 days. 60 Capsule 0    amphetamine-dextroamphetamine (ADDERALL) 10 MG Tab Take 1 Tablet by mouth 1/2 hour after lunch for 30 days. 30 Tablet 0    [START ON 6/11/2025] amphetamine-dextroamphetamine (ADDERALL) 10 MG Tab Take 1 Tablet by mouth 1/2 hour after lunch for 30 days. 30 Tablet 0    cyclobenzaprine (FLEXERIL) 10 MG Tab Take 1 Tablet by mouth 3 times a day as needed for Muscle Spasms. 30 Tablet 1    meloxicam (MOBIC) 7.5 MG Tab TAKE ONE TABLET BY MOUTH DAILY AS NEEDED FOR MODERATE PAIN 90 Tablet 0    cyanocobalamin (VITAMIN B-12) 1000 MCG/ML Solution INJECT 1ML INTO THE SHOULDER, THIGH, OR BUTTOCKS EVERY 30 DAYS FOR 30 DAYS. 10 mL 0    Probiotic Product (PROBIOTIC PO) Take 1 Capsule by mouth every day.      Ferrous Sulfate (IRON PO) Take 325 mg by mouth 1 time a day as needed. During menstrual cycle      FIBER PO Take 1 Scoop by mouth every day.      Cholecalciferol (VITAMIN D3) 5000 units Tab Take 5,000 Units by mouth every day. 30 Tab      No current facility-administered medications for this visit.       Patient Active Problem List    Diagnosis Date Noted    S/P hip replacement 05/01/2023    Acquired hypothyroidism 10/15/2020    B12  "deficiency 10/15/2020    Eyelid disorder 10/15/2020    Anxiety 03/29/2018    Tension headache 03/29/2018    Attention deficit hyperactivity disorder (ADHD), predominantly inattentive type 03/29/2018    Multiple allergies 03/29/2018    Vitamin D insufficiency 08/15/2016    Migraine with aura and without status migrainosus, not intractable 06/09/2015    Muscle spasm 02/03/2014    Mixed hyperlipidemia 10/07/2013        Objective:   Ht 1.549 m (5' 1\")   BMI 27.96 kg/m²     Physical Exam:  Constitutional: Alert, no distress, well-groomed.  Skin: No rashes in visible areas.  Eye: Round. Conjunctiva clear, lids normal. No icterus.   ENMT: Lips pink without lesions, good dentition, moist mucous membranes. Phonation normal.  Neck: No masses, no thyromegaly. Moves freely without pain.  Respiratory: Unlabored respiratory effort, no cough or audible wheeze  Psych: Alert and oriented x3, normal affect and mood.     Assessment and Plan:   The following treatment plan was discussed:     1. Attention deficit hyperactivity disorder (ADHD), predominantly inattentive type  -Current combination of medication works well.  Depending on the day will either take 10-20 mg of the extended release Adderall.  If she takes the 20 mg extended release in the morning does not take an extra tablet in the afternoon of the short release.  If she takes the 10 mg extended release in the morning occasionally will need additional 5 to 10 mg short release Adderall in the afternoon.  PDMP checked.  Prescription sent to the pharmacy.  - amphetamine-dextroamphetamine XR (ADDERALL XR) 10 MG CAPSULE SR 24 HR; Take 2 Capsules by mouth every morning for 30 days.  Dispense: 60 Capsule; Refill: 0  - amphetamine-dextroamphetamine XR (ADDERALL XR) 10 MG CAPSULE SR 24 HR; Take 2 Capsules by mouth every morning for 30 days.  Dispense: 60 Capsule; Refill: 0  - amphetamine-dextroamphetamine XR (ADDERALL XR) 10 MG CAPSULE SR 24 HR; Take 2 Capsules by mouth every morning " for 30 days.  Dispense: 60 Capsule; Refill: 0  - amphetamine-dextroamphetamine (ADDERALL) 10 MG Tab; Take 1 Tablet by mouth 1/2 hour after lunch for 30 days.  Dispense: 30 Tablet; Refill: 0  - amphetamine-dextroamphetamine (ADDERALL) 10 MG Tab; Take 1 Tablet by mouth 1/2 hour after lunch for 30 days.  Dispense: 30 Tablet; Refill: 0    2. Migraine with aura and without status migrainosus, not intractable  Stable.  Sumatriptan works well for her to therapies.  Continue as needed.  Zofran as needed for nausea  - SUMAtriptan (IMITREX) 50 MG Tab; Take 1 Tablet by mouth one time as needed for Migraine for up to 1 dose. May repeat dose in 2 hours if no resolution of migraine  Dispense: 10 Tablet; Refill: 3  - ondansetron (ZOFRAN) 4 MG Tab tablet; Take 1 Tablet by mouth every four hours as needed for Nausea/Vomiting for up to 30 days.  Dispense: 20 Tablet; Refill: 1    3. Mixed hyperlipidemia  -Due for lab work.  Will check lipid panel.  Further treatment if needed pending results  - Lipid Profile; Future    4. Vitamin D insufficiency  -Will check vitamin D level.  Further treatment if needed pending results  - VITAMIN D,25 HYDROXY (DEFICIENCY); Future    5. Blood tests for routine general physical examination  - TSH WITH REFLEX TO FT4; Future  - Lipid Profile; Future  - Comp Metabolic Panel; Future  - CBC WITH DIFFERENTIAL; Future    6. Muscle spasm  -Uses sparingly.  Has intermittent muscle spasm in the hip or neck.  Continue as needed.  - cyclobenzaprine (FLEXERIL) 10 MG Tab; Take 1 Tablet by mouth 3 times a day as needed for Muscle Spasms.  Dispense: 30 Tablet; Refill: 1      Follow-up: Return in about 4 months (around 9/12/2025) for Annual PX, Lab Review.

## 2025-07-26 DIAGNOSIS — F90.0 ATTENTION DEFICIT HYPERACTIVITY DISORDER (ADHD), PREDOMINANTLY INATTENTIVE TYPE: ICD-10-CM

## 2025-07-28 NOTE — TELEPHONE ENCOUNTER
Received request via: Patient    Was the patient seen in the last year in this department? Yes    Does the patient have an active prescription (recently filled or refills available) for medication(s) requested? No    Pharmacy Name: [id number] - [place], [place] - [address]     Does the patient have [facility] and need 100-day supply? (This applies to ALL medications) Patient does not have SCP

## 2025-07-30 RX ORDER — DEXTROAMPHETAMINE SACCHARATE, AMPHETAMINE ASPARTATE MONOHYDRATE, DEXTROAMPHETAMINE SULFATE AND AMPHETAMINE SULFATE 2.5; 2.5; 2.5; 2.5 MG/1; MG/1; MG/1; MG/1
20 CAPSULE, EXTENDED RELEASE ORAL EVERY MORNING
Qty: 60 CAPSULE | Refills: 0 | Status: SHIPPED | OUTPATIENT
Start: 2025-07-30 | End: 2025-08-29

## 2025-10-10 ENCOUNTER — APPOINTMENT (OUTPATIENT)
Dept: MEDICAL GROUP | Age: 45
End: 2025-10-10
Payer: COMMERCIAL

## (undated) DEVICE — SUTURE 2 TICRON BLUE GS-21 (36EA/CA)

## (undated) DEVICE — STAPLER SKIN DISP - (6/BX 10BX/CA) VISISTAT

## (undated) DEVICE — TOWELS CLOTH SURGICAL - (4/PK 20PK/CA)

## (undated) DEVICE — BANDAGE ELASTIC STERILE VELCRO 6 X 5 YDS (25EA/CA)

## (undated) DEVICE — GLOVE BIOGEL PI ORTHO SZ 9 PF LF

## (undated) DEVICE — SODIUM CHL. IRRIGATION 0.9% 3000ML (4EA/CA 65CA/PF)

## (undated) DEVICE — GLOVE BIOGEL PI INDICATOR SZ 7.5 SURGICAL PF LF -(50/BX 4BX/CA)

## (undated) DEVICE — SPONGE GAUZESTER 4 X 4 4PLY - (128PK/CA)

## (undated) DEVICE — SUTURE 1 PDS-2 PLUS CTX - (24/BX)

## (undated) DEVICE — CANISTER SUCTION RIGID RED 1500CC (40EA/CA)

## (undated) DEVICE — SENSOR OXIMETER ADULT SPO2 RD SET (20EA/BX)

## (undated) DEVICE — TOWEL STOP TIMEOUT SAFETY FLAG (40EA/CA)

## (undated) DEVICE — GLOVE BIOGEL INDICATOR SZ 7.5 SURGICAL PF LTX - (50PR/BX 4BX/CA)

## (undated) DEVICE — SYRINGE DISP. 60 CC LL - (30/BX, 12BX/CA)**WHEN THESE ARE GONE ORDER #500206**

## (undated) DEVICE — MASK ANESTHESIA ADULT  - (100/CA)

## (undated) DEVICE — TIP INTPLS HFLO ML ORFC BTRY - (12/CS)  FOR SURGILAV

## (undated) DEVICE — SUTURE QUILL 0 PDO 24IN - (12/BX)

## (undated) DEVICE — DRESSING INTEGUSEAL MICROBIAL SEALANT IS100 (20EA/CA)

## (undated) DEVICE — GOWN WARMING STANDARD FLEX - (30/CA)

## (undated) DEVICE — DRESSING TEGADERM 8 X 12 - (10/BX 8BX/CA)

## (undated) DEVICE — GLOVE BIOGEL SZ 8 SURGICAL PF LTX - (50PR/BX 4BX/CA)

## (undated) DEVICE — LENS/HOOD FOR SPACESUIT - (32/PK) PEEL AWAY FACE

## (undated) DEVICE — SUTURE 0 PDS-2 CTX 36 INCH - (24/BX)

## (undated) DEVICE — CONTAINER SPECIMEN BAG OR - STERILE 4 OZ W/LID (100EA/CA)

## (undated) DEVICE — HEAD HOLDER JUNIOR/ADULT

## (undated) DEVICE — HANDPIECE 10FT INTPLS SCT PLS IRRIGATION HAND CONTROL SET (6/PK)

## (undated) DEVICE — PROTECTOR ULNA NERVE - (36PR/CA)

## (undated) DEVICE — ELECTRODE DUAL RETURN W/ CORD - (50/PK)

## (undated) DEVICE — DRESSING ABDOMINAL PAD STERILE 8 X 10" (360EA/CA)"

## (undated) DEVICE — PACK TOTAL HIP - (1/CA)

## (undated) DEVICE — PEN SKIN MARKER W/RULER - (50EA/BX)

## (undated) DEVICE — DRESSING PETROLEUM GAUZE 5 X 9" (50EA/BX 4BX/CA)"

## (undated) DEVICE — TUBE CONNECTING SUCTION - CLEAR PLASTIC STERILE 72 IN (50EA/CA)

## (undated) DEVICE — SOLUTION PREP PVP IODINE 3/4 OZ POUCH PACKET CONTAINER STERILE LATEX FREE

## (undated) DEVICE — BANDAGE ELASTIC 6 IN X 5 YDS - LATEX FREE (10/BX)

## (undated) DEVICE — CHLORAPREP 26 ML APPLICATOR - ORANGE TINT(25/CA)

## (undated) DEVICE — SUCTION INSTRUMENT YANKAUER BULBOUS TIP W/O VENT (50EA/CA)

## (undated) DEVICE — GLOVE BIOGEL SZ 7 SURGICAL PF LTX - (50PR/BX 4BX/CA)

## (undated) DEVICE — GOWN SURGICAL X-LARGE ULTRA - FILM-REINFORCED (20/CA)

## (undated) DEVICE — GLOVE PROTEXIS LATEX MICRO SIZE 9 (50PR/BX)

## (undated) DEVICE — DRAPE LARGE 3 QUARTER - (20/CA)

## (undated) DEVICE — BLADE SAGITTAL SAW DUAL CUT 75.0 X 25.0MM (1/EA)

## (undated) DEVICE — BAG SPONGE COUNT 10.25 X 32 - BLUE (250/CA)

## (undated) DEVICE — ELECTRODE 850 FOAM ADHESIVE - HYDROGEL RADIOTRNSPRNT (50/PK)

## (undated) DEVICE — LACTATED RINGERS INJ 1000 ML - (14EA/CA 60CA/PF)

## (undated) DEVICE — SODIUM CHL IRRIGATION 0.9% 1000ML (12EA/CA)

## (undated) DEVICE — GLOVE, LITE (PAIR)

## (undated) DEVICE — DRAPE IOBAN II 23 IN X 33 IN - (10/BX)

## (undated) DEVICE — KIT ANESTHESIA W/CIRCUIT & 3/LT BAG W/FILTER (20EA/CA)

## (undated) DEVICE — FIBERWIRE 2.0 (12EA/BX)

## (undated) DEVICE — HUMID-VENT HEAT AND MOISTURE EXCHANGE- (50/BX)

## (undated) DEVICE — SUTURE GENERAL

## (undated) DEVICE — DRAPE U SPLIT IMP 54 X 76 - (24/CA)

## (undated) DEVICE — WATER IRRIGATION STERILE 1000ML (12EA/CA)

## (undated) DEVICE — DRAPE IOBAN II INCISE 23X17 - (10EA/BX 4BX/CA)

## (undated) DEVICE — GLOVE BIOGEL PI INDICATOR SZ 8.0 SURGICAL PF LF -(50/BX 4BX/CA)

## (undated) DEVICE — DRILL BIT

## (undated) DEVICE — NEEDLE SPINAL NON-SAFETY 18 GA X 3 IN (25EA/BX)